# Patient Record
Sex: MALE | Race: WHITE | Employment: FULL TIME | URBAN - METROPOLITAN AREA
[De-identification: names, ages, dates, MRNs, and addresses within clinical notes are randomized per-mention and may not be internally consistent; named-entity substitution may affect disease eponyms.]

---

## 2017-03-07 ENCOUNTER — OFFICE VISIT (OUTPATIENT)
Dept: FAMILY MEDICINE CLINIC | Age: 62
End: 2017-03-07

## 2017-03-07 ENCOUNTER — TELEPHONE (OUTPATIENT)
Dept: FAMILY MEDICINE CLINIC | Age: 62
End: 2017-03-07

## 2017-03-07 VITALS
RESPIRATION RATE: 16 BRPM | HEART RATE: 76 BPM | SYSTOLIC BLOOD PRESSURE: 136 MMHG | WEIGHT: 208 LBS | DIASTOLIC BLOOD PRESSURE: 70 MMHG | BODY MASS INDEX: 28.21 KG/M2 | TEMPERATURE: 98.4 F

## 2017-03-07 DIAGNOSIS — R60.0 PERIORBITAL EDEMA: ICD-10-CM

## 2017-03-07 DIAGNOSIS — E11.9 TYPE 2 DIABETES, HBA1C GOAL < 8% (HCC): ICD-10-CM

## 2017-03-07 DIAGNOSIS — Z86.73 HISTORY OF CVA (CEREBROVASCULAR ACCIDENT): ICD-10-CM

## 2017-03-07 DIAGNOSIS — H10.13 ALLERGIC CONJUNCTIVITIS, BILATERAL: Primary | ICD-10-CM

## 2017-03-07 PROCEDURE — 99213 OFFICE O/P EST LOW 20 MIN: CPT | Performed by: NURSE PRACTITIONER

## 2017-03-07 PROCEDURE — 96372 THER/PROPH/DIAG INJ SC/IM: CPT | Performed by: NURSE PRACTITIONER

## 2017-03-07 RX ORDER — METHYLPREDNISOLONE ACETATE 80 MG/ML
120 INJECTION, SUSPENSION INTRA-ARTICULAR; INTRALESIONAL; INTRAMUSCULAR; SOFT TISSUE ONCE
Status: COMPLETED | OUTPATIENT
Start: 2017-03-07 | End: 2017-03-07

## 2017-03-07 RX ORDER — OLOPATADINE HYDROCHLORIDE 2 MG/ML
1 SOLUTION/ DROPS OPHTHALMIC DAILY
Qty: 1 BOTTLE | Refills: 0 | Status: SHIPPED | OUTPATIENT
Start: 2017-03-07 | End: 2017-04-07 | Stop reason: ALTCHOICE

## 2017-03-07 RX ORDER — PREDNISONE 10 MG/1
TABLET ORAL
Qty: 30 TABLET | Refills: 0 | Status: SHIPPED | OUTPATIENT
Start: 2017-03-07 | End: 2017-03-17

## 2017-03-07 RX ORDER — ERYTHROMYCIN 5 MG/G
1 OINTMENT OPHTHALMIC 3 TIMES DAILY
Qty: 1 TUBE | Refills: 0 | Status: SHIPPED | OUTPATIENT
Start: 2017-03-07 | End: 2017-03-17

## 2017-03-07 RX ORDER — AZITHROMYCIN 250 MG/1
TABLET, FILM COATED ORAL
Qty: 6 TABLET | Refills: 0 | Status: SHIPPED | OUTPATIENT
Start: 2017-03-07 | End: 2017-03-17

## 2017-03-07 RX ADMIN — METHYLPREDNISOLONE ACETATE 120 MG: 80 INJECTION, SUSPENSION INTRA-ARTICULAR; INTRALESIONAL; INTRAMUSCULAR; SOFT TISSUE at 09:10

## 2017-03-20 ASSESSMENT — ENCOUNTER SYMPTOMS
RESPIRATORY NEGATIVE: 1
EYE REDNESS: 1
EYE DISCHARGE: 1
GASTROINTESTINAL NEGATIVE: 1
PHOTOPHOBIA: 1

## 2017-04-05 RX ORDER — AMLODIPINE BESYLATE AND BENAZEPRIL HYDROCHLORIDE 10; 40 MG/1; MG/1
1 CAPSULE ORAL DAILY
Qty: 10 CAPSULE | Refills: 0 | Status: SHIPPED | OUTPATIENT
Start: 2017-04-05 | End: 2017-04-07 | Stop reason: SDUPTHER

## 2017-04-07 ENCOUNTER — OFFICE VISIT (OUTPATIENT)
Dept: FAMILY MEDICINE CLINIC | Age: 62
End: 2017-04-07

## 2017-04-07 VITALS
BODY MASS INDEX: 28.04 KG/M2 | TEMPERATURE: 98.2 F | HEIGHT: 72 IN | RESPIRATION RATE: 12 BRPM | WEIGHT: 207 LBS | HEART RATE: 64 BPM | DIASTOLIC BLOOD PRESSURE: 64 MMHG | SYSTOLIC BLOOD PRESSURE: 116 MMHG

## 2017-04-07 DIAGNOSIS — H53.40 VISUAL FIELD CUT: ICD-10-CM

## 2017-04-07 DIAGNOSIS — E11.9 DM TYPE 2, GOAL HBA1C < 7% (HCC): ICD-10-CM

## 2017-04-07 DIAGNOSIS — E78.2 MIXED HYPERLIPIDEMIA: ICD-10-CM

## 2017-04-07 DIAGNOSIS — Z13.9 SCREENING: ICD-10-CM

## 2017-04-07 DIAGNOSIS — Z00.00 WELLNESS EXAMINATION: Primary | ICD-10-CM

## 2017-04-07 DIAGNOSIS — I63.50 CEREBRAL ARTERY OCCLUSION WITH CEREBRAL INFARCTION (HCC): ICD-10-CM

## 2017-04-07 DIAGNOSIS — M15.9 PRIMARY OSTEOARTHRITIS INVOLVING MULTIPLE JOINTS: Chronic | ICD-10-CM

## 2017-04-07 DIAGNOSIS — N40.0 BENIGN NON-NODULAR PROSTATIC HYPERPLASIA WITHOUT LOWER URINARY TRACT SYMPTOMS: Chronic | ICD-10-CM

## 2017-04-07 DIAGNOSIS — I10 ESSENTIAL HYPERTENSION: Chronic | ICD-10-CM

## 2017-04-07 DIAGNOSIS — R06.3 CENTRAL SLEEP APNEA DUE TO CHEYNE-STOKES RESPIRATION: ICD-10-CM

## 2017-04-07 PROCEDURE — 99396 PREV VISIT EST AGE 40-64: CPT | Performed by: NURSE PRACTITIONER

## 2017-04-07 RX ORDER — ATENOLOL 25 MG/1
25 TABLET ORAL DAILY
Qty: 90 TABLET | Refills: 3 | Status: SHIPPED | OUTPATIENT
Start: 2017-04-07 | End: 2017-10-13 | Stop reason: RX

## 2017-04-07 RX ORDER — GLIMEPIRIDE 2 MG/1
2 TABLET ORAL
Qty: 90 TABLET | Refills: 3 | Status: SHIPPED | OUTPATIENT
Start: 2017-04-07 | End: 2018-06-10 | Stop reason: SDUPTHER

## 2017-04-07 RX ORDER — IBUPROFEN 200 MG
TABLET ORAL
Refills: 0 | COMMUNITY
Start: 2017-03-22 | End: 2017-04-07 | Stop reason: SDUPTHER

## 2017-04-07 RX ORDER — ATORVASTATIN CALCIUM 40 MG/1
40 TABLET, FILM COATED ORAL DAILY
Qty: 90 TABLET | Refills: 0 | Status: SHIPPED | OUTPATIENT
Start: 2017-04-07 | End: 2017-04-07

## 2017-04-07 RX ORDER — AMLODIPINE BESYLATE AND BENAZEPRIL HYDROCHLORIDE 10; 40 MG/1; MG/1
1 CAPSULE ORAL DAILY
Qty: 90 CAPSULE | Refills: 3 | Status: SHIPPED | OUTPATIENT
Start: 2017-04-07 | End: 2018-05-25 | Stop reason: SDUPTHER

## 2017-04-07 RX ORDER — TOBRAMYCIN AND DEXAMETHASONE 3; 1 MG/ML; MG/ML
SUSPENSION/ DROPS OPHTHALMIC
Refills: 0 | COMMUNITY
Start: 2017-03-28 | End: 2017-09-26

## 2017-04-18 ASSESSMENT — ENCOUNTER SYMPTOMS
GASTROINTESTINAL NEGATIVE: 1
EYES NEGATIVE: 1
BACK PAIN: 1
RESPIRATORY NEGATIVE: 1

## 2017-05-13 LAB
ALBUMIN SERPL-MCNC: 4.5 G/DL (ref 3.2–5.3)
ALK PHOSPHATASE: 70 IU/L (ref 35–121)
ALT SERPL-CCNC: 55 IU/L (ref 5–59)
ANION GAP SERPL CALCULATED.3IONS-SCNC: 12 MMOL/L
AST SERPL-CCNC: 29 IU/L (ref 10–42)
AVERAGE GLUCOSE: 143 MG/DL (ref 66–114)
BILIRUB SERPL-MCNC: 1 MG/DL (ref 0.2–1.3)
BUN BLDV-MCNC: 17 MG/DL (ref 10–20)
CALCIUM SERPL-MCNC: 9.6 MG/DL (ref 8.7–10.8)
CHLORIDE BLD-SCNC: 106 MMOL/L (ref 95–111)
CHOLESTEROL/HDL RATIO: 3.4
CHOLESTEROL: 171 MG/DL
CO2: 26 MMOL/L (ref 21–32)
CREAT SERPL-MCNC: 0.8 MG/DL (ref 0.5–1.3)
EGFR AFRICAN AMERICAN: 120
EGFR IF NONAFRICAN AMERICAN: 99
GLUCOSE: 133 MG/DL (ref 70–100)
HBA1C MFR BLD: 6.6 % (ref 4.2–5.8)
HDLC SERPL-MCNC: 50 MG/DL (ref 40–60)
LDL CHOLESTEROL CALCULATED: 112 MG/DL
LDL/HDL RATIO: 2.2
POTASSIUM SERPL-SCNC: 4.4 MMOL/L (ref 3.5–5.4)
PSA, ULTRASENSITIVE: 2.21 NG/ML
SODIUM BLD-SCNC: 140 MMOL/L (ref 134–147)
TOTAL PROTEIN: 6.8 G/DL (ref 5.8–8)
TRIGL SERPL-MCNC: 46 MG/DL
VLDLC SERPL CALC-MCNC: 9 MG/DL

## 2017-05-30 RX ORDER — IBUPROFEN 200 MG
TABLET ORAL
Qty: 90 TABLET | Refills: 2 | Status: SHIPPED | OUTPATIENT
Start: 2017-05-30 | End: 2017-09-26 | Stop reason: SDUPTHER

## 2017-06-01 ENCOUNTER — TELEPHONE (OUTPATIENT)
Dept: CARDIOLOGY | Age: 62
End: 2017-06-01

## 2017-09-26 ENCOUNTER — OFFICE VISIT (OUTPATIENT)
Dept: FAMILY MEDICINE CLINIC | Age: 62
End: 2017-09-26
Payer: COMMERCIAL

## 2017-09-26 VITALS
TEMPERATURE: 98.1 F | DIASTOLIC BLOOD PRESSURE: 76 MMHG | RESPIRATION RATE: 12 BRPM | SYSTOLIC BLOOD PRESSURE: 138 MMHG | HEIGHT: 73 IN | HEART RATE: 64 BPM | WEIGHT: 206 LBS | BODY MASS INDEX: 27.3 KG/M2

## 2017-09-26 DIAGNOSIS — E11.9 DM TYPE 2, GOAL HBA1C < 7% (HCC): ICD-10-CM

## 2017-09-26 DIAGNOSIS — Z11.59 NEED FOR HEPATITIS C SCREENING TEST: ICD-10-CM

## 2017-09-26 DIAGNOSIS — H65.21 RIGHT CHRONIC SEROUS OTITIS MEDIA: Primary | ICD-10-CM

## 2017-09-26 PROCEDURE — 99213 OFFICE O/P EST LOW 20 MIN: CPT | Performed by: NURSE PRACTITIONER

## 2017-09-26 PROCEDURE — 96372 THER/PROPH/DIAG INJ SC/IM: CPT | Performed by: NURSE PRACTITIONER

## 2017-09-26 RX ORDER — PREDNISONE 1 MG/1
5 TABLET ORAL 2 TIMES DAILY
Qty: 14 TABLET | Refills: 0 | Status: SHIPPED | OUTPATIENT
Start: 2017-09-26 | End: 2017-10-03

## 2017-09-26 RX ORDER — AZITHROMYCIN 250 MG/1
TABLET, FILM COATED ORAL
Qty: 6 TABLET | Refills: 0 | Status: SHIPPED | OUTPATIENT
Start: 2017-09-26 | End: 2017-10-06

## 2017-09-26 RX ORDER — METHYLPREDNISOLONE ACETATE 80 MG/ML
80 INJECTION, SUSPENSION INTRA-ARTICULAR; INTRALESIONAL; INTRAMUSCULAR; SOFT TISSUE ONCE
Status: COMPLETED | OUTPATIENT
Start: 2017-09-26 | End: 2017-09-26

## 2017-09-26 RX ADMIN — METHYLPREDNISOLONE ACETATE 80 MG: 80 INJECTION, SUSPENSION INTRA-ARTICULAR; INTRALESIONAL; INTRAMUSCULAR; SOFT TISSUE at 08:32

## 2017-09-26 ASSESSMENT — PATIENT HEALTH QUESTIONNAIRE - PHQ9
2. FEELING DOWN, DEPRESSED OR HOPELESS: 0
SUM OF ALL RESPONSES TO PHQ QUESTIONS 1-9: 0
SUM OF ALL RESPONSES TO PHQ9 QUESTIONS 1 & 2: 0
1. LITTLE INTEREST OR PLEASURE IN DOING THINGS: 0

## 2017-09-26 NOTE — PATIENT INSTRUCTIONS
of: July 29, 2016  Content Version: 11.3  © 6020-4258 [employer]. Care instructions adapted under license by [facility] ([facility]). If you have questions about a medical condition or this instruction, always ask your healthcare professional. [address] any warranty or liability for your use of this information.

## 2017-09-26 NOTE — PROGRESS NOTES
After obtaining consent, and per orders of Durene Flavors, injection of Depo 80 mg was given in left glut IM by Oralia Mejias. Patient instructed to report any adverse reaction to me immediately.

## 2017-09-26 NOTE — MR AVS SNAPSHOT
treatment. You may need more tests if the fluid does not clear up after 3 months. · If your doctor prescribed antibiotics, take them as directed. Do not stop taking them just because you feel better. You need to take the full course of antibiotics. When should you call for help? Call your doctor now or seek immediate medical care if:  · You have symptoms of infection, such as:  ¨ Increased pain, swelling, warmth, or redness. ¨ Pus draining from the area. ¨ A fever. Watch closely for changes in your health, and be sure to contact your doctor if:  · You notice changes in hearing. · You do not get better as expected. Where can you learn more? Go to [email or web address].[email or web address] org and sign in to your SpeechTrans account. Enter P353 in the [employer] box to learn more about \"Middle Ear Fluid: Care Instructions. \"     If you do not have an account, please click on the \"Sign Up Now\" link. Current as of: July 29, 2016  Content Version: 11.3  © 4287-4266 [email or web address]. Care instructions adapted under license by [facility] ([facility]). If you have questions about a medical condition or this instruction, always ask your healthcare professional. [place] [address] any warranty or liability for your use of this information. Today's Medication Changes          These changes are accurate as of: 9/26/17  8:32 AM.  If you have any questions, ask your nurse or doctor. START taking these medications           azithromycin 250 MG tablet   Commonly known as:  ZITHROMAX Z-RAMIRO   Instructions:  2 pills orally for 1 day, then 1 pill orally for 4 days   Quantity:  6 tablet   Refills:  0   Started by:  Domingo Mello NP       predniSONE 5 MG tablet   Commonly known as:  DELTASONE   Instructions:   Take 1 tablet by mouth 2 times daily for 7 days   Quantity:  14 tablet   Refills:  0   Started by:  Domingo Mello NP         STOP taking these medications

## 2017-10-05 ENCOUNTER — TELEPHONE (OUTPATIENT)
Dept: FAMILY MEDICINE CLINIC | Age: 62
End: 2017-10-05

## 2017-10-05 RX ORDER — CIPROFLOXACIN 500 MG/1
500 TABLET, FILM COATED ORAL 2 TIMES DAILY
Qty: 20 TABLET | Refills: 0 | Status: SHIPPED | OUTPATIENT
Start: 2017-10-05 | End: 2017-10-15

## 2017-10-05 ASSESSMENT — ENCOUNTER SYMPTOMS
GASTROINTESTINAL NEGATIVE: 1
ALLERGIC/IMMUNOLOGIC NEGATIVE: 1
WHEEZING: 1
EYES NEGATIVE: 1

## 2017-10-05 NOTE — TELEPHONE ENCOUNTER
Patient said that his ear is about 60% better but he is still having problems. Do you want to prescribe more medication?   Pharmacy [address] [facility] Please advise [phone]

## 2017-10-05 NOTE — PROGRESS NOTES
History   Substance Use Topics    Smoking status: Former Smoker     Packs/day: 0.50     Years: 40.00     Types: Cigarettes     Quit date: 8/8/2013    Smokeless tobacco: Never Used      Comment: pt has Wellbutrin and started using it in December 2012    Alcohol use 0.0 oz/week      Comment: social      Current Outpatient Prescriptions   Medication Sig Dispense Refill    azithromycin (ZITHROMAX Z-RAMIRO) 250 MG tablet 2 pills orally for 1 day, then 1 pill orally for 4 days 6 tablet 0    Esomeprazole Magnesium (NEXIUM 24HR PO) Take by mouth      OLIVE LEAF PO Take by mouth      amLODIPine-benazepril (LOTREL) 10-40 MG per capsule Take 1 capsule by mouth daily 90 capsule 3    metFORMIN (GLUCOPHAGE) 500 MG tablet TAKE 2 TABLETS BY MOUTH TWICE DAILY 360 tablet 3    glimepiride (AMARYL) 2 MG tablet Take 1 tablet by mouth every morning (before breakfast) 90 tablet 3    atenolol (TENORMIN) 25 MG tablet Take 1 tablet by mouth daily 90 tablet 3    aspirin 325 MG tablet Take 325 mg by mouth daily      SITagliptin (JANUVIA) 100 MG tablet Take 1 tablet by mouth daily 90 tablet 3    glucose blood VI test strips (ACCU-CHEK SMARTVIEW) strip Test daily E11.9 50 strip 11    NONFORMULARY daily PROSTATE SUPPORT      multivitamin (THERAGRAN) per tablet Take 1 tablet by mouth daily.  Lancets MISC Use qd  Dx:  250.02 50 each 11    cetirizine (ZYRTEC) 10 MG tablet Take 10 mg by mouth daily. No current facility-administered medications for this visit.       No Known Allergies  Health Maintenance   Topic Date Due    Hepatitis C screen  1955    HIV screen  05/21/1972    DTaP/Tdap/Td vaccine (1 - Tdap) 05/21/1976    Pneumococcal med risk (1 of 1 - PPSV23) 05/21/1976    Diabetic retinal exam  07/16/2016    Zostavax vaccine  05/21/2017    Flu vaccine (1) 09/01/2017    Diabetic microalbuminuria test  09/02/2017    Diabetic foot exam  03/20/2018    Diabetic hemoglobin A1C test  05/12/2018    Lipid screen 2018    Colon cancer screen colonoscopy  2022         Objective:     Physical Exam   Constitutional: He is oriented to person, place, and time. He appears well-developed and well-nourished. HENT:   Head: Normocephalic and atraumatic. Right Ear: External ear normal. A middle ear effusion is present. Left Ear: Tympanic membrane is erythematous. A middle ear effusion is present. Nose: Nose normal.   Mouth/Throat: Oropharynx is clear and moist.   Eyes: Conjunctivae are normal.   Neck: Normal range of motion. Neck supple. Cardiovascular: Normal rate, regular rhythm and normal heart sounds. Pulmonary/Chest: Effort normal and breath sounds normal.   Abdominal: Soft. Bowel sounds are normal.   Musculoskeletal: Normal range of motion. Neurological: He is oriented to person, place, and time. Skin: Skin is dry. Psychiatric: He has a normal mood and affect. Nursing note and vitals reviewed. neg pedal exam, cap refill wnl, neg monofilament   /76  Pulse 64  Temp 98.1 °F (36.7 °C) (Oral)   Resp 12  Ht 6' 1\" (1.854 m)  Wt 206 lb (93.4 kg)  BMI 27.18 kg/m2      Impression/Plan:  1. Right chronic serous otitis media    2. DM type 2, goal HbA1c < 7% (HCC)    3.  Need for hepatitis C screening test      Requested Prescriptions     Signed Prescriptions Disp Refills    azithromycin (ZITHROMAX Z-RAMIRO) 250 MG tablet 6 tablet 0     Si pills orally for 1 day, then 1 pill orally for 4 days    predniSONE (DELTASONE) 5 MG tablet 14 tablet 0     Sig: Take 1 tablet by mouth 2 times daily for 7 days     Orders Placed This Encounter   Procedures    Hepatitis C Antibody     Standing Status:   Future     Standing Expiration Date:   2018    Comprehensive Metabolic Panel     Standing Status:   Future     Standing Expiration Date:   2018    Hemoglobin A1C     Standing Status:   Future     Standing Expiration Date:   2018    Albumin, Random Urine     Standing Status:   Future Standing Expiration Date:   9/26/2018       Patient given educational materials - see patient instructions. Discussed use, benefit, and side effects of prescribed medications. All patient questions answered. Pt voiced understanding. Reviewed health maintenance. Patient agreed with treatment plan. Follow up as directed.           Electronically signed by Gabe Hardy NP on 10/5/2017 at 1:08 PM

## 2017-10-12 NOTE — TELEPHONE ENCOUNTER
Received a fax from [facility] stating that they are not able to get the Atenolol 25mg and they are requesting a replacement prescription be sent to them. Please advise.     Last office visit 9/26/17

## 2017-10-13 RX ORDER — NEBIVOLOL 5 MG/1
5 TABLET ORAL DAILY
Qty: 90 TABLET | Refills: 3 | Status: SHIPPED | OUTPATIENT
Start: 2017-10-13 | End: 2018-12-12 | Stop reason: SDUPTHER

## 2017-10-20 ENCOUNTER — OFFICE VISIT (OUTPATIENT)
Dept: FAMILY MEDICINE CLINIC | Age: 62
End: 2017-10-20
Payer: COMMERCIAL

## 2017-10-20 ENCOUNTER — OFFICE VISIT (OUTPATIENT)
Dept: CARDIOLOGY CLINIC | Age: 62
End: 2017-10-20
Payer: COMMERCIAL

## 2017-10-20 VITALS
DIASTOLIC BLOOD PRESSURE: 76 MMHG | BODY MASS INDEX: 27.5 KG/M2 | HEIGHT: 72 IN | SYSTOLIC BLOOD PRESSURE: 142 MMHG | WEIGHT: 203 LBS | HEART RATE: 75 BPM

## 2017-10-20 VITALS
WEIGHT: 209.9 LBS | DIASTOLIC BLOOD PRESSURE: 66 MMHG | TEMPERATURE: 98.7 F | BODY MASS INDEX: 27.82 KG/M2 | HEART RATE: 84 BPM | RESPIRATION RATE: 16 BRPM | SYSTOLIC BLOOD PRESSURE: 138 MMHG | HEIGHT: 73 IN

## 2017-10-20 DIAGNOSIS — H91.91 DECREASED HEARING, RIGHT: Primary | ICD-10-CM

## 2017-10-20 DIAGNOSIS — F32.A MILD EPISODE OF DEPRESSION: ICD-10-CM

## 2017-10-20 DIAGNOSIS — H65.21 RIGHT CHRONIC SEROUS OTITIS MEDIA: ICD-10-CM

## 2017-10-20 DIAGNOSIS — I10 ESSENTIAL HYPERTENSION: Primary | Chronic | ICD-10-CM

## 2017-10-20 PROCEDURE — 99213 OFFICE O/P EST LOW 20 MIN: CPT | Performed by: NUCLEAR MEDICINE

## 2017-10-20 PROCEDURE — 99213 OFFICE O/P EST LOW 20 MIN: CPT | Performed by: NURSE PRACTITIONER

## 2017-10-20 PROCEDURE — 93000 ELECTROCARDIOGRAM COMPLETE: CPT | Performed by: NUCLEAR MEDICINE

## 2017-10-20 RX ORDER — CITALOPRAM 20 MG/1
20 TABLET ORAL DAILY
Qty: 30 TABLET | Refills: 3 | Status: SHIPPED | OUTPATIENT
Start: 2017-10-20 | End: 2018-07-25

## 2017-10-20 RX ORDER — CITALOPRAM 10 MG/1
10 TABLET ORAL DAILY
Qty: 30 TABLET | Refills: 0 | Status: SHIPPED | OUTPATIENT
Start: 2017-10-20 | End: 2018-02-09 | Stop reason: ALTCHOICE

## 2017-10-20 NOTE — PROGRESS NOTES
LEAF PO Take by mouth      amLODIPine-benazepril (LOTREL) 10-40 MG per capsule Take 1 capsule by mouth daily 90 capsule 3    metFORMIN (GLUCOPHAGE) 500 MG tablet TAKE 2 TABLETS BY MOUTH TWICE DAILY 360 tablet 3    glimepiride (AMARYL) 2 MG tablet Take 1 tablet by mouth every morning (before breakfast) 90 tablet 3    aspirin 325 MG tablet Take 325 mg by mouth daily      SITagliptin (JANUVIA) 100 MG tablet Take 1 tablet by mouth daily 90 tablet 3    glucose blood VI test strips (ACCU-CHEK SMARTVIEW) strip Test daily E11.9 50 strip 11    NONFORMULARY daily PROSTATE SUPPORT      multivitamin (THERAGRAN) per tablet Take 1 tablet by mouth daily.  Lancets MISC Use qd  Dx:  250.02 50 each 11    cetirizine (ZYRTEC) 10 MG tablet Take 10 mg by mouth daily. No current facility-administered medications for this visit.       No Known Allergies  Health Maintenance   Topic Date Due    Hepatitis C screen  1955    HIV screen  05/21/1972    DTaP/Tdap/Td vaccine (1 - Tdap) 05/21/1976    Pneumococcal med risk (1 of 1 - PPSV23) 05/21/1976    Diabetic retinal exam  07/16/2016    Zostavax vaccine  05/21/2017    Flu vaccine (1) 09/01/2017    Diabetic microalbuminuria test  09/02/2017    Diabetic foot exam  03/20/2018    Diabetic hemoglobin A1C test  05/12/2018    Lipid screen  05/12/2018    Colon cancer screen colonoscopy  09/08/2027       Subjective:  Review of Systems  General:   No fever, no chills, No fatigue or weight loss  Pulmonary:    some dyspnea, no wheezing  Cardiac:    Denies recent chest pain,   GI:     No nausea or vomiting, no abdominal pain  Neuro:     No dizziness or light headedness,   Musculoskeletal:  No recent active issues  Extremities:   No edema, good peripheral pulses      Objective:  Physical Exam  BP (!) 142/70   Pulse 75   Ht 6' (1.829 m)   Wt 203 lb (92.1 kg)   BMI 27.53 kg/m²   General:   Well developed, well nourished  Lungs:    Clear to auscultation  Heart: Normal S1 S2, Slight murmur. no rubs, no gallops  Abdomen:   Soft, non tender, no organomegalies, positive bowel sounds  Extremities:   No edema, no cyanosis, good peripheral pulses  Neurological:   Awake, alert, oriented. No obvious focal deficits  Musculoskelatal:  No obvious deformities    Assessment:     1. Essential hypertension  EKG 12 Lead   risk for CAD  No new symptoms  Diabetes mellitus: followed by family physician, seems stable. Patient needs very tight control to minimize cardiac risk    ECG in office was done today. I reviewed the ECG. No acute findings      Plan:  No Follow-up on file. As above  Continue risk factor modification and medical management  Thank you for allowing me to participate in the care of your patient. Please don't hesitate to contact me regarding any further issues related to the patient care    Orders Placed:  Orders Placed This Encounter   Procedures    EKG 12 Lead     Order Specific Question:   Reason for Exam?     Answer: Other       Medications Prescribed:  No orders of the defined types were placed in this encounter. Discussed use, benefit, and side effects of prescribed medications. All patient questions answered. Pt voiced understanding. Instructed to continue current medications, diet and exercise. Continue risk factor modification and medical management. Patient agreed with treatment plan. Follow up as directed.     Electronically signed by Rosetta Moon MD on 10/20/2017 at 11:36 AM

## 2017-10-20 NOTE — PATIENT INSTRUCTIONS
You may receive a survey about your visit with us today. The feedback from our patients helps us identify what is working well and where the service to all patients can be enhanced. Thank you! Patient Education        Recovering From Depression: Care Instructions  Your Care Instructions  Taking good care of yourself is important as you recover from depression. In time, your symptoms will fade as your treatment takes hold. Do not give up. Instead, focus your energy on getting better. Your mood will improve. It just takes some time. Focus on things that can help you feel better, such as being with friends and family, eating well, and getting enough rest. But take things slowly. Do not do too much too soon. You will begin to feel better gradually. Follow-up care is a key part of your treatment and safety. Be sure to make and go to all appointments, and call your doctor if you are having problems. It's also a good idea to know your test results and keep a list of the medicines you take. How can you care for yourself at home? Be realistic  · If you have a large task to do, break it up into smaller steps you can handle, and just do what you can. · You may want to put off important decisions until your depression has lifted. If you have plans that will have a major impact on your life, such as marriage, divorce, or a job change, try to wait a bit. Talk it over with friends and loved ones who can help you look at the overall picture first.  · Reaching out to people for help is important. Do not isolate yourself. Let your family and friends help you. Find someone you can trust and confide in, and talk to that person. · Be patient, and be kind to yourself. Remember that depression is not your fault and is not something you can overcome with willpower alone. Treatment is necessary for depression, just like for any other illness. Feeling better takes time, and your mood will improve little by little.   Stay active  · Stay busy and get outside. Take a walk, or try some other light exercise. · Talk with your doctor about an exercise program. Exercise can help with mild depression. · Go to a movie or concert. Take part in a Adventism activity or other social gathering. Go to a ball game. · Ask a friend to have dinner with you. Take care of yourself  · Eat a balanced diet with plenty of fresh fruits and vegetables, whole grains, and lean protein. If you have lost your appetite, eat small snacks rather than large meals. · Avoid drinking alcohol or using illegal drugs. Do not take medicines that have not been prescribed for you. They may interfere with medicines you may be taking for depression, or they may make your depression worse. · Take your medicines exactly as they are prescribed. You may start to feel better within 1 to 3 weeks of taking antidepressant medicine. But it can take as many as 6 to 8 weeks to see more improvement. If you have questions or concerns about your medicines, or if you do not notice any improvement by 3 weeks, talk to your doctor. · If you have any side effects from your medicine, tell your doctor. Antidepressants can make you feel tired, dizzy, or nervous. Some people have dry mouth, constipation, headaches, sexual problems, or diarrhea. Many of these side effects are mild and will go away on their own after you have been taking the medicine for a few weeks. Some may last longer. Talk to your doctor if side effects are bothering you too much. You might be able to try a different medicine. · Get enough sleep. If you have problems sleeping:  ¨ Go to bed at the same time every night, and get up at the same time every morning. ¨ Keep your bedroom dark and quiet. ¨ Do not exercise after 5:00 p.m. ¨ Avoid drinks with caffeine after 5:00 p.m. · Avoid sleeping pills unless they are prescribed by the doctor treating your depression.  Sleeping pills may make you groggy during the day, and they may interact with other medicine you are taking. · If you have any other illnesses, such as diabetes, heart disease, or high blood pressure, make sure to continue with your treatment. Tell your doctor about all of the medicines you take, including those with or without a prescription. · Keep the numbers for these national suicide hotlines: 0-270-114-TALK (7-695.557.1968) and 4-171-FDMYTVJ (0-363.766.8886). If you or someone you know talks about suicide or feeling hopeless, get help right away. When should you call for help? Call 911 anytime you think you may need emergency care. For example, call if:  · You feel like hurting yourself or someone else. · Someone you know has depression and is about to attempt or is attempting suicide. Call your doctor now or seek immediate medical care if:  · You hear voices. · Someone you know has depression and:  ¨ Starts to give away his or her possessions. ¨ Uses illegal drugs or drinks alcohol heavily. ¨ Talks or writes about death, including writing suicide notes or talking about guns, knives, or pills. ¨ Starts to spend a lot of time alone. ¨ Acts very aggressively or suddenly appears calm. Watch closely for changes in your health, and be sure to contact your doctor if:  · You do not get better as expected. Where can you learn more? Go to https://MemorandompeApplyMap.Deitek Systems. org and sign in to your [employer] account. Enter H662 in the [facility] box to learn more about \"Recovering From Depression: Care Instructions. \"     If you do not have an account, please click on the \"Sign Up Now\" link. Current as of: July 26, 2016  Content Version: 11.3  © 3132-1551 [employer]. Care instructions adapted under license by [facility] ([facility]). If you have questions about a medical condition or this instruction, always ask your healthcare professional. [address] any warranty or liability for your use of this information.        Patient the medicine for a few weeks. Some may last longer. Talk to your doctor if side effects bother you too much. You might be able to try a different medicine. If you are pregnant or breastfeeding, talk to your doctor about what medicines you can take. Learn about counseling  In many cases, counseling can work as well as medicines to treat mild to moderate depression. Counseling is done by licensed mental health providers, such as psychologists, social workers, and some types of nurses. It can be done in one-on-one sessions or in a group setting. Many people find group sessions helpful. Cognitive-behavioral therapy is a type of counseling. In this treatment therapy, you learn how to see and change unhelpful thinking styles that may be adding to your depression. Counseling and medicines often work well when used together. To manage depression  · Be physically active. Getting 30 minutes of exercise each day is good for your body and your mind. Begin slowly if it is hard for you to get started. If you already exercise, keep it up. · Plan something pleasant for yourself every day. Include activities that you have enjoyed in the past.  · Get enough sleep. Talk to your doctor if you have problems sleeping. · Eat a balanced diet. If you do not feel hungry, eat small snacks rather than large meals. · Do not drink alcohol, use illegal drugs, or take medicines that your doctor has not prescribed for you. They may interfere with your treatment. · Spend time with family and friends. It may help to speak openly about your depression with people you trust.  · Take your medicines exactly as prescribed. Call your doctor if you think you are having a problem with your medicine. · Do not make major life decisions while you are depressed. Depression may change the way you think. You will be able to make better decisions after you feel better. · Think positively.  Challenge negative thoughts with statements such as \"I am hopeful\"; in how well you hear. It can range from mild to severe. Permanent hearing loss can occur with aging. It also can happen when you are exposed long-term to loud noise. Examples include listening to loud music, riding motorcycles, or being around other loud machines. Hearing loss can affect your work and home life. It can make you feel lonely or depressed. You may feel that you have lost your independence. But hearing aids and other devices can help you hear better and feel connected to others. Follow-up care is a key part of your treatment and safety. Be sure to make and go to all appointments, and call your doctor if you are having problems. It's also a good idea to know your test results and keep a list of the medicines you take. How can you care for yourself at home? · Avoid loud noises whenever possible. This helps keep your hearing from getting worse. · Always wear hearing protection around loud noises. · Wear a hearing aid as directed. See a person who can help you pick a hearing aid that fits you. · Have hearing tests as your doctor suggests. They can show whether your hearing has changed. Your hearing aid may need to be adjusted. · Use other devices as needed. These may include:  ¨ Telephone amplifiers and hearing aids that can connect to a television, stereo, radio, or microphone. ¨ Devices that use lights or vibrations. These alert you to the doorbell, a ringing telephone, or a baby monitor. ¨ Television closed-captioning. This shows the words at the bottom of the screen. Most new TVs can do this. Jessica Saleh (text telephone). This lets you type messages back and forth on the telephone instead of talking or listening. These devices are also called TDD. When messages are typed on the keyboard, they are sent over the phone line to a receiving TTY. The message is shown on a monitor. · Use pagers, fax machines, and email if it is hard for you to communicate by telephone.   · Try to learn a listening

## 2017-10-27 NOTE — PROGRESS NOTES
[address]  [facility]  [facility]  Dept: [phone]  Dept Fax: [phone]: [phone]  PROGRESS NOTE      Visit Date: 10/27/2017    Shirley Jonas is a 63 y.o. male who presents today for:  Chief Complaint   Patient presents with    Ear Problem     here today for an ear problem-echoing- feels like something is in there--has went through 2 rounds of antibiotics    Other     would like a mood enhancer         Subjective:  C/o continued decreased hearing right ear, ear fullness, echoing. tx atb x2, steroids. No benefit. No drainage, dizziness, ha, cp, sob,, ha. C/o depressive mood. Lack of interest, daytime sleepiness, fatgiue. Review of Systems   HENT: Positive for ear pain and hearing loss. Negative for ear discharge. Psychiatric/Behavioral: Positive for decreased concentration and dysphoric mood. All other systems reviewed and are negative.     Past Medical History:   Diagnosis Date    BPH (benign prostatic hyperplasia)     COPD (chronic obstructive pulmonary disease) (HCC)     Depression     Diabetes mellitus ([address]     Factor 5 Leiden mutation, heterozygous ([address]     Heterozygous MTHFR mutation [id number] ([address]     Hyperglycemia     Hypertension     Hypertension     Osteoarthritis     Sleep apnea     Stroke ([address] 8/8/2013    Type II or unspecified type diabetes mellitus without mention of complication, not stated as uncontrolled 8/2012    Visual field cut 8/8/2013    Right visual field cut      Past Surgical History:   Procedure Laterality Date    ABDOMEN SURGERY      APPENDECTOMY      CERVICAL FUSION  08    COLONOSCOPY  6/8/12    [facility]    SHOULDER SURGERY      error    TONSILLECTOMY      UMBILICAL HERNIA REPAIR  09/17/2012  Dr Rose Cage     Family History   Problem Relation Age of Onset    Diabetes Father     Diabetes Brother     Stroke Brother      Social History   Substance Use Topics    Smoking status: Former Smoker     Packs/day: 0.50     Years: 40.00     Types: Cigarettes     Quit date: 8/8/2013    Smokeless tobacco: Never Used      Comment: pt has Wellbutrin and started using it in December 2012    Alcohol use 0.0 oz/week      Comment: social      Current Outpatient Prescriptions   Medication Sig Dispense Refill    citalopram (CELEXA) 10 MG tablet Take 1 tablet by mouth daily 30 tablet 0    citalopram (CELEXA) 20 MG tablet Take 1 tablet by mouth daily 30 tablet 3    nebivolol (BYSTOLIC) 5 MG tablet Take 1 tablet by mouth daily 90 tablet 3    Esomeprazole Magnesium (NEXIUM 24HR PO) Take by mouth      OLIVE LEAF PO Take by mouth      amLODIPine-benazepril (LOTREL) 10-40 MG per capsule Take 1 capsule by mouth daily 90 capsule 3    metFORMIN (GLUCOPHAGE) 500 MG tablet TAKE 2 TABLETS BY MOUTH TWICE DAILY 360 tablet 3    glimepiride (AMARYL) 2 MG tablet Take 1 tablet by mouth every morning (before breakfast) 90 tablet 3    aspirin 325 MG tablet Take 325 mg by mouth daily      SITagliptin (JANUVIA) 100 MG tablet Take 1 tablet by mouth daily 90 tablet 3    glucose blood VI test strips (ACCU-CHEK SMARTVIEW) strip Test daily E11.9 50 strip 11    NONFORMULARY daily PROSTATE SUPPORT      multivitamin (THERAGRAN) per tablet Take 1 tablet by mouth daily.  Lancets MISC Use qd  Dx:  250.02 50 each 11    cetirizine (ZYRTEC) 10 MG tablet Take 10 mg by mouth daily. No current facility-administered medications for this visit.       No Known Allergies  Health Maintenance   Topic Date Due    Hepatitis C screen  1955    HIV screen  05/21/1972    DTaP/Tdap/Td vaccine (1 - Tdap) 05/21/1976    Pneumococcal med risk (1 of 1 - PPSV23) 05/21/1976    Diabetic retinal exam  07/16/2016    Zostavax vaccine  05/21/2017    Flu vaccine (1) 09/01/2017    Diabetic microalbuminuria test  09/02/2017    Diabetic foot exam  03/20/2018    Diabetic hemoglobin A1C test  05/12/2018    Lipid screen 05/12/2018    Colon cancer screen colonoscopy  09/08/2027         Objective:     Physical Exam   Constitutional: He is oriented to person, place, and time. He appears well-developed and well-nourished. HENT:   Head: Normocephalic. Right Ear: External ear normal. A middle ear effusion is present. Left Ear: External ear normal.   Mouth/Throat: Oropharynx is clear and moist.   Eyes: Conjunctivae are normal.   Neck: Neck supple. Cardiovascular: Normal rate, regular rhythm, normal heart sounds and intact distal pulses. Pulmonary/Chest: Effort normal and breath sounds normal.   Abdominal: Soft. Musculoskeletal: Normal range of motion. Neurological: He is alert and oriented to person, place, and time. Skin: Skin is warm and dry. Psychiatric: He has a normal mood and affect. Nursing note and vitals reviewed. /66 (Site: Right Arm, Position: Sitting)   Pulse 84   Temp 98.7 °F (37.1 °C) (Oral)   Resp 16   Ht 6' 1\" (1.854 m)   Wt 209 lb 14.4 oz (95.2 kg)   BMI 27.69 kg/m²       Impression/Plan:  1. Decreased hearing, right    2. Mild episode of depression    3. Right chronic serous otitis media      Requested Prescriptions     Signed Prescriptions Disp Refills    citalopram (CELEXA) 10 MG tablet 30 tablet 0     Sig: Take 1 tablet by mouth daily    citalopram (CELEXA) 20 MG tablet 30 tablet 3     Sig: Take 1 tablet by mouth daily     Orders Placed This Encounter   Procedures   Óscar Fischer MD     Referral Priority:   Routine     Referral Type:   Consult for Advice and Opinion     Referral Reason:   Specialty Services Required     Referred to Provider:   Anthony Muse MD     Requested Specialty:   Otolaryngology     Number of Visits Requested:   1    Audiometry with tympanometry     Standing Status:   Future     Standing Expiration Date:   10/20/2018       Patient given educational materials - see patient instructions.   Discussed use, benefit, and side effects of prescribed medications. All patient questions answered. Pt voiced understanding. Reviewed health maintenance. Patient agreed with treatment plan. Follow up as directed.           Electronically signed by Nabil Ortega NP on 10/27/2017 at 11:47 AM

## 2017-11-03 ENCOUNTER — HOSPITAL ENCOUNTER (OUTPATIENT)
Dept: AUDIOLOGY | Age: 62
Discharge: HOME | End: 2017-11-03
Payer: COMMERCIAL

## 2017-11-03 PROCEDURE — 92567 TYMPANOMETRY: CPT | Performed by: AUDIOLOGIST

## 2017-11-03 PROCEDURE — 92557 COMPREHENSIVE HEARING TEST: CPT | Performed by: AUDIOLOGIST

## 2017-11-03 NOTE — LETTER
[address] Audiology  [facility]  [address]  [address] [place] [address] Road [facility]  Phone: [phone]    Zana Duty        November 3, 2017     Augustine Doherty [address] [facility] [address], [address]    Patient: Ciara Larry   MR Number: [id number]   YOB: 1955   Date of Visit: 11/3/2017       Dear Dr. Darwin Schwab: Thank you for referring Becca Alicea to me for evaluation. Below are the relevant portions of my assessment and plan of care. ACCOUNT #: [de-identified]    AUDIOLOGICAL EVALUATION      REASON FOR TESTING:  Patient reported aural fullness and decreased hearing in his right ear. He finished taking an antibiotic but is still experiencing symptoms. Patient has a history of occupational noise exposure (). OTOSCOPY: clear EAC's bilaterally. AUDIOGRAM        Reliability: good  Audiometer Used:  GSI-61    PURE TONES     RE    LE         WNL            Mild        Moderate           Mod-Severe       Severe        Profound    TYPE     RE    LE        SNHL         Conductive HL        Mixed HL      CONFIGURATION    RE    LE        Essentially Flat          Sloping      Steeply Sloping       Rising      Cookie Bite    SPEECH AUDIOMETRY   Right Left Sound Field Aided   PTA 20 8     SRT 20 10     SAT       MASKING       % WRS   QUIET 100 100      30 SL 30 SL     %WRS   NOISE              MCL       [facility]            Live Voice       Recorded       List        WORD RECOGNITION   RE    LE       Excellent         Good      Fair      Poor      Very Poor    TYMPANOGRAMS  RE    LE       WNL         WNL w/reduced mobility      WNL w/hyper mobility      Negative pressure      Flat w/normal ECV      Flat w/large ECV      Patent PE tube      Non-Patent PE tube      Could Not Test    COMMENTS: Audiometric results indicate a moderate mixed low frequency hearing loss rising to normal hearing sloping to a moderate mixed loss in the right ear.   Hearing is normal through 2000 Hz in the left ear sloping

## 2017-12-29 RX ORDER — IBUPROFEN 200 MG
TABLET ORAL
Qty: 90 TABLET | Refills: 2 | Status: SHIPPED | OUTPATIENT
Start: 2017-12-29 | End: 2018-02-09 | Stop reason: ALTCHOICE

## 2018-02-03 LAB
ALBUMIN SERPL-MCNC: 4.6 G/DL (ref 3.2–5.3)
ALK PHOSPHATASE: 66 IU/L (ref 35–121)
ALT SERPL-CCNC: 45 IU/L (ref 5–59)
ANION GAP SERPL CALCULATED.3IONS-SCNC: 12 MMOL/L
AST SERPL-CCNC: 25 IU/L (ref 10–42)
AVERAGE GLUCOSE: 126 MG/DL (ref 66–114)
BILIRUB SERPL-MCNC: 1 MG/DL (ref 0.2–1.3)
BUN BLDV-MCNC: 15 MG/DL (ref 10–20)
CALCIUM SERPL-MCNC: 9.6 MG/DL (ref 8.7–10.8)
CHLORIDE BLD-SCNC: 103 MMOL/L (ref 95–111)
CO2: 28 MMOL/L (ref 21–32)
CREAT SERPL-MCNC: 0.9 MG/DL (ref 0.5–1.3)
CREATINE, URINE: 187.3 MG/DL
EGFR AFRICAN AMERICAN: 104
EGFR IF NONAFRICAN AMERICAN: 86
GLUCOSE: 152 MG/DL (ref 70–100)
HBA1C MFR BLD: 6 % (ref 4.2–5.8)
HEPATITIS C ANTIBODY: NEGATIVE
MICROALBUMIN/CREAT 24H UR: 2.6 MG/DL
MICROALBUMIN/CREAT UR-RTO: 14 MCG/MG
POTASSIUM SERPL-SCNC: 4.2 MMOL/L (ref 3.5–5.4)
SODIUM BLD-SCNC: 139 MMOL/L (ref 134–147)
TOTAL PROTEIN: 6.7 G/DL (ref 5.8–8)

## 2018-02-09 ENCOUNTER — OFFICE VISIT (OUTPATIENT)
Dept: PULMONOLOGY | Age: 63
End: 2018-02-09
Payer: COMMERCIAL

## 2018-02-09 VITALS
HEART RATE: 64 BPM | HEIGHT: 73 IN | DIASTOLIC BLOOD PRESSURE: 66 MMHG | WEIGHT: 207.4 LBS | BODY MASS INDEX: 27.49 KG/M2 | SYSTOLIC BLOOD PRESSURE: 134 MMHG | OXYGEN SATURATION: 97 %

## 2018-02-09 DIAGNOSIS — R06.3 CENTRAL SLEEP APNEA DUE TO CHEYNE-STOKES RESPIRATION: Primary | ICD-10-CM

## 2018-02-09 PROCEDURE — 99213 OFFICE O/P EST LOW 20 MIN: CPT | Performed by: PHYSICIAN ASSISTANT

## 2018-02-09 ASSESSMENT — ENCOUNTER SYMPTOMS
COUGH: 0
SINUS PAIN: 0
WHEEZING: 0
HEARTBURN: 0
SHORTNESS OF BREATH: 0
RESPIRATORY NEGATIVE: 1
SPUTUM PRODUCTION: 0
ORTHOPNEA: 0
GASTROINTESTINAL NEGATIVE: 1
SORE THROAT: 0
NAUSEA: 0
EYES NEGATIVE: 1

## 2018-03-15 RX ORDER — SITAGLIPTIN 100 MG/1
100 TABLET, FILM COATED ORAL DAILY
Qty: 90 TABLET | Refills: 2 | Status: SHIPPED | OUTPATIENT
Start: 2018-03-15 | End: 2018-12-28 | Stop reason: SDUPTHER

## 2018-05-30 RX ORDER — AMLODIPINE BESYLATE AND BENAZEPRIL HYDROCHLORIDE 10; 40 MG/1; MG/1
1 CAPSULE ORAL DAILY
Qty: 90 CAPSULE | Refills: 2 | Status: SHIPPED | OUTPATIENT
Start: 2018-05-30 | End: 2019-02-25 | Stop reason: SDUPTHER

## 2018-06-11 RX ORDER — GLIMEPIRIDE 2 MG/1
TABLET ORAL
Qty: 90 TABLET | Refills: 0 | Status: SHIPPED | OUTPATIENT
Start: 2018-06-11 | End: 2018-10-12

## 2018-07-20 ENCOUNTER — NURSE TRIAGE (OUTPATIENT)
Dept: ADMINISTRATIVE | Age: 63
End: 2018-07-20

## 2018-07-20 NOTE — TELEPHONE ENCOUNTER
Wife Hoda Xie states that he told her yesterday, \"I just have no feelings about anything. I should have just stayed home\" Had seen FP before and was put on Citolapram- apparent real low dose. Seems to help a little to begin with. Has been sleeping a lot ,just sitting on the desk. Not sure if she should just wait to see the Dr on Wed. States that he did have an uncle commit suicide and she feels that she just got a lot of warning signs yesterday. Discussed that they are in [facility]. I advised that she go down to the  and inquire of local medical care that does have mental health screening, if they can help her. Like at [facility]. We have a team that would assess him and report to the D r if the person is a danger to themselves or what needs to happen. Then she can approach her  with the idea of being seen. Sounds like something that she will consider, Just do not want her feeling isolated there , till they leave to come home on Sunday.

## 2018-07-25 ENCOUNTER — OFFICE VISIT (OUTPATIENT)
Dept: FAMILY MEDICINE CLINIC | Age: 63
End: 2018-07-25
Payer: COMMERCIAL

## 2018-07-25 VITALS
BODY MASS INDEX: 27.7 KG/M2 | SYSTOLIC BLOOD PRESSURE: 130 MMHG | HEIGHT: 73 IN | RESPIRATION RATE: 16 BRPM | HEART RATE: 76 BPM | DIASTOLIC BLOOD PRESSURE: 74 MMHG | WEIGHT: 209 LBS

## 2018-07-25 DIAGNOSIS — R06.3 CENTRAL SLEEP APNEA DUE TO CHEYNE-STOKES RESPIRATION: ICD-10-CM

## 2018-07-25 DIAGNOSIS — Z86.73 HISTORY OF CVA IN ADULTHOOD: ICD-10-CM

## 2018-07-25 DIAGNOSIS — F34.1 DYSTHYMIA: Primary | ICD-10-CM

## 2018-07-25 PROCEDURE — 99213 OFFICE O/P EST LOW 20 MIN: CPT | Performed by: NURSE PRACTITIONER

## 2018-07-25 RX ORDER — LATANOPROST 50 UG/ML
1 SOLUTION/ DROPS OPHTHALMIC DAILY
COMMUNITY
Start: 2018-06-18

## 2018-07-29 ENCOUNTER — HOSPITAL ENCOUNTER (EMERGENCY)
Age: 63
Discharge: HOME | End: 2018-07-29
Payer: COMMERCIAL

## 2018-07-29 VITALS
OXYGEN SATURATION: 97 % | HEIGHT: 72 IN | BODY MASS INDEX: 27.77 KG/M2 | SYSTOLIC BLOOD PRESSURE: 119 MMHG | DIASTOLIC BLOOD PRESSURE: 61 MMHG | WEIGHT: 205 LBS | HEART RATE: 64 BPM | TEMPERATURE: 97.7 F | RESPIRATION RATE: 18 BRPM

## 2018-07-29 DIAGNOSIS — L23.7 POISON IVY DERMATITIS: Primary | ICD-10-CM

## 2018-07-29 PROCEDURE — 99213 OFFICE O/P EST LOW 20 MIN: CPT

## 2018-07-29 PROCEDURE — 6360000002 HC RX W HCPCS: Performed by: NURSE PRACTITIONER

## 2018-07-29 PROCEDURE — 99213 OFFICE O/P EST LOW 20 MIN: CPT | Performed by: NURSE PRACTITIONER

## 2018-07-29 PROCEDURE — 96372 THER/PROPH/DIAG INJ SC/IM: CPT

## 2018-07-29 RX ORDER — PREDNISONE 20 MG/1
40 TABLET ORAL DAILY
Qty: 10 TABLET | Refills: 0 | Status: SHIPPED | OUTPATIENT
Start: 2018-07-29 | End: 2018-08-03

## 2018-07-29 RX ORDER — METHYLPREDNISOLONE ACETATE 80 MG/ML
80 INJECTION, SUSPENSION INTRA-ARTICULAR; INTRALESIONAL; INTRAMUSCULAR; SOFT TISSUE ONCE
Status: COMPLETED | OUTPATIENT
Start: 2018-07-29 | End: 2018-07-29

## 2018-07-29 RX ORDER — ACETAMINOPHEN 160 MG
TABLET,DISINTEGRATING ORAL DAILY
COMMUNITY

## 2018-07-29 RX ADMIN — METHYLPREDNISOLONE ACETATE 80 MG: 80 INJECTION, SUSPENSION INTRA-ARTICULAR; INTRALESIONAL; INTRAMUSCULAR; SOFT TISSUE at 15:21

## 2018-07-29 ASSESSMENT — ENCOUNTER SYMPTOMS
CHEST TIGHTNESS: 0
VOMITING: 0
NAUSEA: 0
WHEEZING: 0
ABDOMINAL PAIN: 0
SHORTNESS OF BREATH: 0

## 2018-07-29 NOTE — ED PROVIDER NOTES
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 HISTORY OF PRESENT ILLNESS   Bruce Cooper is a 66 y.o. male who presents For evaluation of a pruritic rash to his bilateral hands, arms, and chest for the past 4 days. Patient reports that 6 days ago he was pulling weeds at his daughter's house and believes that he was exposed to poison ivy. Patient denies any chest pain, shortness breath, or difficulty swallowing. He denies having used any over-the-counter remedies at home other than Hydrocortone cream.    The history is provided by the patient. REVIEW OF SYSTEMS     Review of Systems   Constitutional: Negative for chills and fatigue. Respiratory: Negative for chest tightness, shortness of breath and wheezing. Cardiovascular: Negative for chest pain. Gastrointestinal: Negative for abdominal pain, nausea and vomiting. Skin: Positive for rash. PAST MEDICAL HISTORY         Diagnosis Date    BPH (benign prostatic hyperplasia)     Depression     Diabetes [facility]     Factor 5 Leiden mutation, heterozygous ([facility]     Heterozygous MTHFR mutation [id number] ([facility]     Hyperglycemia     Hypertension     Hypertension     Osteoarthritis     Sleep apnea     Stroke ([facility] 8/8/2013    Type II or unspecified type diabetes mellitus without mention of complication, not stated as uncontrolled 8/2012    Visual field cut 8/8/2013    Right visual field cut       SURGICAL HISTORY     Patient  has a past surgical history that includes Appendectomy; Colonoscopy (6/8/12); cervical fusion (08); Umbilical hernia repair (09/17/2012  Dr Sergio Medina); Abdomen surgery; and Tonsillectomy.     CURRENT MEDICATIONS       Previous Medications    AMLODIPINE-BENAZEPRIL (LOTREL) 10-40 MG PER CAPSULE

## 2018-07-31 ASSESSMENT — ENCOUNTER SYMPTOMS
EYES NEGATIVE: 1
RESPIRATORY NEGATIVE: 1
GASTROINTESTINAL NEGATIVE: 1

## 2018-07-31 NOTE — PROGRESS NOTES
[address] [address]  Dept: [phone]  Dept Fax: [phone]: [phone]  PROGRESS NOTE      Visit Date: 7/31/2018    Macario Simpson is a 66 y.o. male who presents today for:  Chief Complaint   Patient presents with    Depression     Depressed, flat affect, sleeping more, no emotion. Was started on Celexa back in October, but worse last week while on vacation. Wife states he has a family hx of depression with 2 family members dying from suicide. He denies suicidal thoughts. Subjective:  Pt presents with spouse c/o depressive mood, flat affect, no emotion, sleeping more, tearful.family history of suicide. Denies suicidal thoughts. Review of Systems   Constitutional: Negative. HENT: Negative. Eyes: Negative. Respiratory: Negative. Cardiovascular: Negative. Gastrointestinal: Negative. Endocrine: Negative. Genitourinary: Negative. Musculoskeletal: Negative. Skin: Negative. Neurological: Negative. Hematological: Negative. Psychiatric/Behavioral: Positive for decreased concentration and dysphoric mood. Negative for hallucinations and suicidal ideas.      Past Medical History:   Diagnosis Date    BPH (benign prostatic hyperplasia)     Depression     Diabetes mellitus (HCC)     Factor 5 Leiden mutation, heterozygous [facility]     Heterozygous MTHFR mutation [id number] ([facility]     Hyperglycemia     Hypertension     Hypertension     Osteoarthritis     Sleep apnea     Stroke ([facility] 8/8/2013    Type II or unspecified type diabetes mellitus without mention of complication, not stated as uncontrolled 8/2012    Visual field cut 8/8/2013    Right visual field cut      Past Surgical History:   Procedure Laterality Date    ABDOMEN SURGERY      APPENDECTOMY      CERVICAL FUSION  08    COLONOSCOPY  6/8/12    Chick Brenna REPAIR  09/17/2012   Shelly Olivo     Family History   Problem Relation Age of Onset    Diabetes Father     Diabetes Brother     Stroke Brother      Social History   Substance Use Topics    Smoking status: Former Smoker     Packs/day: 0.50     Years: 40.00     Types: E-Cigarettes     Quit date: 8/8/2013    Smokeless tobacco: Never Used      Comment: States he uses Vap    Alcohol use 0.0 oz/week      Comment: social      Current Outpatient Prescriptions   Medication Sig Dispense Refill    latanoprost (XALATAN) 0.005 % ophthalmic solution Place 1 drop into both eyes daily       VORTIoxetine (TRINTELLIX) 10 MG TABS tablet Take 10 mg by mouth daily 30 tablet 3    glimepiride (AMARYL) 2 MG tablet TAKE 1 TABLET BY MOUTH EVERY MORNING BEFORE BREAKFAST 90 tablet 0    amLODIPine-benazepril (LOTREL) 10-40 MG per capsule TAKE 1 CAPSULE BY MOUTH DAILY 90 capsule 2    metFORMIN (GLUCOPHAGE) 500 MG tablet TAKE 2 TABLETS BY MOUTH TWICE DAILY 360 tablet 1    JANUVIA 100 MG tablet TAKE 1 TABLET BY MOUTH DAILY 90 tablet 2    nebivolol (BYSTOLIC) 5 MG tablet Take 1 tablet by mouth daily 90 tablet 3    Esomeprazole Magnesium (NEXIUM 24HR PO) Take by mouth      OLIVE LEAF PO Take by mouth      aspirin 325 MG tablet Take 325 mg by mouth daily      glucose blood VI test strips (ACCU-CHEK SMARTVIEW) strip Test daily E11.9 50 strip 11    NONFORMULARY daily PROSTATE SUPPORT      multivitamin (THERAGRAN) per tablet Take 1 tablet by mouth daily.  Lancets MISC Use qd  Dx:  250.02 50 each 11    Cholecalciferol (VITAMIN D3) 2000 units CAPS Take by mouth      predniSONE (DELTASONE) 20 MG tablet Take 2 tablets by mouth daily for 5 days 10 tablet 0    cetirizine (ZYRTEC) 10 MG tablet Take 10 mg by mouth daily. No current facility-administered medications for this visit.       No Known Allergies  Health Maintenance   Topic Date Due    HIV screen  05/21/1972    DTaP/Tdap/Td vaccine (1 - Tdap) 05/21/1976    Pneumococcal med risk (1 of 1 - PPSV23) 05/21/1976    Shingles Vaccine (1 of 2 - 2 Dose Series) 05/21/2007    Diabetic foot exam  03/20/2018    Lipid screen  05/12/2018    Flu vaccine (1) 09/01/2018    A1C test (Diabetic or Prediabetic)  02/02/2019    Diabetic microalbuminuria test  02/02/2019    Potassium monitoring  02/02/2019    Creatinine monitoring  02/02/2019    Diabetic retinal exam  04/27/2019    Colon cancer screen colonoscopy  09/08/2027    Hepatitis C screen  Completed         Objective:     Physical Exam  /74   Pulse 76   Resp 16   Ht 6' 0.5\" (1.842 m)   Wt 209 lb (94.8 kg)   BMI 27.96 kg/m²       Impression/Plan:  1. Dysthymia    2. History of CVA in adulthood    3. Central sleep apnea due to Cheyne-Spence respiration      Requested Prescriptions     Signed Prescriptions Disp Refills    VORTIoxetine (TRINTELLIX) 10 MG TABS tablet 30 tablet 3     Sig: Take 10 mg by mouth daily     No orders of the defined types were placed in this encounter. Patient given educational materials - see patient instructions. Discussed use, benefit, and side effects of prescribed medications. All patient questions answered. Pt voiced understanding. Reviewed health maintenance. Patient agreed with treatment plan. Follow up as directed.           Electronically signed by ARTURO Thao CNP on 7/31/2018 at 7:52 PM

## 2018-08-13 RX ORDER — BLOOD SUGAR DIAGNOSTIC
STRIP MISCELLANEOUS
Qty: 50 STRIP | Refills: 11 | Status: SHIPPED | OUTPATIENT
Start: 2018-08-13

## 2018-08-29 ENCOUNTER — OFFICE VISIT (OUTPATIENT)
Dept: FAMILY MEDICINE CLINIC | Age: 63
End: 2018-08-29
Payer: COMMERCIAL

## 2018-08-29 VITALS
DIASTOLIC BLOOD PRESSURE: 78 MMHG | SYSTOLIC BLOOD PRESSURE: 138 MMHG | BODY MASS INDEX: 27.57 KG/M2 | TEMPERATURE: 98.5 F | WEIGHT: 208 LBS | RESPIRATION RATE: 14 BRPM | HEART RATE: 60 BPM | HEIGHT: 73 IN

## 2018-08-29 DIAGNOSIS — R41.3 MEMORY CHANGES: ICD-10-CM

## 2018-08-29 DIAGNOSIS — Z86.73 HISTORY OF ARTERIAL ISCHEMIC STROKE: ICD-10-CM

## 2018-08-29 DIAGNOSIS — Z12.5 SCREENING FOR PROSTATE CANCER: ICD-10-CM

## 2018-08-29 DIAGNOSIS — R41.0 CONFUSION: ICD-10-CM

## 2018-08-29 DIAGNOSIS — Z13.220 SCREENING CHOLESTEROL LEVEL: ICD-10-CM

## 2018-08-29 DIAGNOSIS — R06.3 CENTRAL SLEEP APNEA DUE TO CHEYNE-STOKES RESPIRATION: ICD-10-CM

## 2018-08-29 DIAGNOSIS — I10 ESSENTIAL HYPERTENSION: Chronic | ICD-10-CM

## 2018-08-29 DIAGNOSIS — E11.9 DM TYPE 2, GOAL HBA1C < 7% (HCC): Primary | ICD-10-CM

## 2018-08-29 DIAGNOSIS — F32.4 MAJOR DEPRESSIVE DISORDER IN PARTIAL REMISSION, UNSPECIFIED WHETHER RECURRENT (HCC): ICD-10-CM

## 2018-08-29 PROCEDURE — 99213 OFFICE O/P EST LOW 20 MIN: CPT | Performed by: NURSE PRACTITIONER

## 2018-08-29 NOTE — PATIENT INSTRUCTIONS
You may receive a survey about your visit with us today. The feedback from our patients helps us identify what is working well and where the service to all patients can be enhanced. Thank you! Patient Education        Preventing a Relapse of Depression: Care Instructions  Your Care Instructions    A relapse of depression means your symptoms have come back after you have gotten better. This illness often comes and goes during a lifetime. But there are many things you can do to keep it from coming back. Follow-up care is a key part of your treatment and safety. Be sure to make and go to all appointments, and call your doctor if you are having problems. It's also a good idea to know your test results and keep a list of the medicines you take. What do you need to know? Know your risk of relapse  Talk to your doctor to find out if you are at risk of relapse. Many things can make a person more likely to relapse into depression. These include having a family member with depression, dealing with serious problems in a relationship or a job, having a serious medical condition, or abusing drugs or alcohol. It is important to know your risk and to recognize warning signs of relapse. Once you know these things, you will be better able to keep it from happening to you. Know the warning signs of relapse  The two most common signs of relapse are:  · Feeling sad or hopeless. · Losing interest in your daily activities. You may have other symptoms, such as:  · You lose or gain weight. · You sleep too much or not enough. · You feel restless and unable to sit still. · You feel unable to move. · You feel tired all the time. · You feel unworthy or guilty without an obvious reason. · You have problems concentrating, remembering, or making decisions. · You think often about death or suicide. · You feel angry or have panic attacks. How can you care for yourself at home? · Take your medicine as prescribed.  Call your doctor if you have any problems with your medicine. Many people take their medicines for at least 6 months after they have recovered. This often helps keep symptoms from coming back. However, if your depression keeps coming back, you may have to take medicine for the rest of your life. · Continue counseling even after you have stopped taking medicine. · Eat healthy foods. Include fruits, vegetables, beans, and whole grains in your diet each day. · Get at least 30 minutes of exercise on most days of the week. Walking is a good choice. You also may want to do other activities, such as running, swimming, cycling, or playing tennis or team sports. · See your doctor right away if you have new symptoms or feel that your depression is coming back. · Keep a regular sleep schedule. Try for 8 hours of sleep a night. · Avoid alcohol and illegal drugs. · Keep the numbers for these national suicide hotlines: 4-310-393-TALK (5-878.633.8947) and 7-788-HPJPYJC (6-453.418.7640). If you or someone you know talks about suicide or feeling hopeless, get help right away. When should you call for help? Call 911 anytime you think you may need emergency care.  For example, call if:    · You are thinking about suicide or are threatening suicide.     · You feel you cannot stop from hurting yourself or someone else.     · You hear or see things that aren't real.     · You think or speak in a bizarre way that is not like your usual behavior.    Call your doctor now or seek immediate medical care if:    · You are drinking a lot of alcohol or using illegal drugs.     · You are talking or writing about death.    Watch closely for changes in your health, and be sure to contact your doctor if:    · You find it hard or it's getting harder to deal with school, a job, family, or friends.     · You think your treatment is not helping or you are not getting better.     · Your symptoms get worse or you get new symptoms.     · You have any problems with your antidepressant medicines, such as side effects, or you are thinking about stopping your medicine.     · You are having manic behavior, such as having very high energy, needing less sleep than normal, or showing risky behavior such as spending money you don't have or abusing others verbally or physically. Where can you learn more? Go to https://chpepiceweb.Xtellus. org and sign in to your [employer] account. Enter N011 in the Epic Playground box to learn more about \"Preventing a Relapse of Depression: Care Instructions. \"     If you do not have an account, please click on the \"Sign Up Now\" link. Current as of: December 7, 2017  Content Version: 11.7  © 9237-8900 [email or web address]. Care instructions adapted under license by [facility] ([facility] [place]). If you have questions about a medical condition or this instruction, always ask your healthcare professional. [place] [address] any warranty or liability for your use of this information. Patient Education        Depression Treatment: Care Instructions  Your Care Instructions    Depression is a condition that affects the way you feel, think, and act. It causes symptoms such as low energy, loss of interest in daily activities, and sadness or grouchiness that goes on for a long time. Depression is very common and affects men and women of all ages. Depression is a medical illness caused by changes in the natural chemicals in your brain. It is not a character flaw, and it does not mean that you are a bad or weak person. It does not mean that you are going crazy. It is important to know that depression can be treated. Medicines, counseling, and self-care can all help. Many people do not get help because they are embarrassed or think that they will get over the depression on their own. But some people do not get better without treatment. Follow-up care is a key part of your treatment and safety.  Be sure to make and go to all · You hear voices.     · You feel much more depressed.    Watch closely for changes in your health, and be sure to contact your doctor if:    · You are having problems with your depression medicine.     · You are not getting better as expected. Where can you learn more? Go to https://chpesharonaeb.Tifen.com. org and sign in to your [employer] account. Enter D115 in the Kiip box to learn more about \"Depression Treatment: Care Instructions. \"     If you do not have an account, please click on the \"Sign Up Now\" link. Current as of: December 7, 2017  Content Version: 11.7  © 7801-0522 [email or web address]. Care instructions adapted under license by [facility] ([facility]). If you have questions about a medical condition or this instruction, always ask your healthcare professional. [address] any warranty or liability for your use of this information.

## 2018-09-07 ENCOUNTER — HOSPITAL ENCOUNTER (OUTPATIENT)
Dept: INTERVENTIONAL RADIOLOGY/VASCULAR | Age: 63
Discharge: HOME | End: 2018-09-07
Payer: COMMERCIAL

## 2018-09-07 DIAGNOSIS — R41.0 CONFUSION: ICD-10-CM

## 2018-09-07 DIAGNOSIS — R41.3 MEMORY CHANGES: ICD-10-CM

## 2018-09-07 DIAGNOSIS — Z86.73 HISTORY OF ARTERIAL ISCHEMIC STROKE: ICD-10-CM

## 2018-09-07 PROCEDURE — 93880 EXTRACRANIAL BILAT STUDY: CPT

## 2018-09-11 ASSESSMENT — ENCOUNTER SYMPTOMS
EYES NEGATIVE: 1
ALLERGIC/IMMUNOLOGIC NEGATIVE: 1
GASTROINTESTINAL NEGATIVE: 1
RESPIRATORY NEGATIVE: 1

## 2018-09-11 NOTE — PROGRESS NOTES
Brother      Social History   Substance Use Topics    Smoking status: Former Smoker     Packs/day: 0.50     Years: 40.00     Types: E-Cigarettes     Quit date: 8/8/2013    Smokeless tobacco: Never Used      Comment: States he uses Vap    Alcohol use 0.0 oz/week      Comment: social      Current Outpatient Prescriptions   Medication Sig Dispense Refill    ACCU-CHEK SMARTVIEW strip TEST BLOOD SUGAR ONCE A DAY 50 strip 11    Cholecalciferol (VITAMIN D3) 2000 units CAPS Take by mouth      latanoprost (XALATAN) 0.005 % ophthalmic solution Place 1 drop into both eyes daily       VORTIoxetine (TRINTELLIX) 10 MG TABS tablet Take 10 mg by mouth daily 30 tablet 3    glimepiride (AMARYL) 2 MG tablet TAKE 1 TABLET BY MOUTH EVERY MORNING BEFORE BREAKFAST 90 tablet 0    amLODIPine-benazepril (LOTREL) 10-40 MG per capsule TAKE 1 CAPSULE BY MOUTH DAILY 90 capsule 2    metFORMIN (GLUCOPHAGE) 500 MG tablet TAKE 2 TABLETS BY MOUTH TWICE DAILY 360 tablet 1    JANUVIA 100 MG tablet TAKE 1 TABLET BY MOUTH DAILY 90 tablet 2    nebivolol (BYSTOLIC) 5 MG tablet Take 1 tablet by mouth daily 90 tablet 3    Esomeprazole Magnesium (NEXIUM 24HR PO) Take by mouth      OLIVE LEAF PO Take by mouth      aspirin 325 MG tablet Take 325 mg by mouth daily      NONFORMULARY daily PROSTATE SUPPORT      multivitamin (THERAGRAN) per tablet Take 1 tablet by mouth daily.  Lancets MISC Use qd  Dx:  250.02 50 each 11    cetirizine (ZYRTEC) 10 MG tablet Take 10 mg by mouth daily. No current facility-administered medications for this visit.       No Known Allergies  Health Maintenance   Topic Date Due    HIV screen  05/21/1972    DTaP/Tdap/Td vaccine (1 - Tdap) 05/21/1976    Pneumococcal med risk (1 of 1 - PPSV23) 05/21/1976    Shingles Vaccine (1 of 2 - 2 Dose Series) 05/21/2007    Diabetic foot exam  03/20/2018    Lipid screen  05/12/2018    Flu vaccine (1) 09/01/2018    A1C test (Diabetic or Prediabetic)  02/02/2019   

## 2018-10-12 ENCOUNTER — OFFICE VISIT (OUTPATIENT)
Dept: CARDIOLOGY CLINIC | Age: 63
End: 2018-10-12
Payer: COMMERCIAL

## 2018-10-12 VITALS
WEIGHT: 209 LBS | HEIGHT: 72 IN | DIASTOLIC BLOOD PRESSURE: 78 MMHG | HEART RATE: 70 BPM | BODY MASS INDEX: 28.31 KG/M2 | SYSTOLIC BLOOD PRESSURE: 128 MMHG

## 2018-10-12 DIAGNOSIS — I10 ESSENTIAL HYPERTENSION: Primary | Chronic | ICD-10-CM

## 2018-10-12 PROCEDURE — 99213 OFFICE O/P EST LOW 20 MIN: CPT | Performed by: NUCLEAR MEDICINE

## 2018-10-12 PROCEDURE — 93000 ELECTROCARDIOGRAM COMPLETE: CPT | Performed by: NUCLEAR MEDICINE

## 2018-10-12 RX ORDER — GLIMEPIRIDE 2 MG/1
TABLET ORAL
Qty: 90 TABLET | Refills: 2 | Status: SHIPPED | OUTPATIENT
Start: 2018-10-12 | End: 2019-08-02 | Stop reason: SDUPTHER

## 2018-10-12 NOTE — PROGRESS NOTES
[address] CARDIOLOGY  [address]  [address] [address]  Dept: [phone]  Dept Fax: [phone]  Loc: [phone]    Visit Date: 10/12/2018    Ivana Man is a 66 y.o. male who presents today for:  Chief Complaint   Patient presents with    Check-Up    Diabetes    Hypertension     No known CAD  BP a little higher today   Better at home  No chest pain  No changes in breathing  Bs is stable     HPI:  HPI  Past Medical History:   Diagnosis Date    BPH (benign prostatic hyperplasia)     Depression     [facility]     [facility], [facility]     Heterozygous MTHFR mutation [id number] ([facility]     Hyperglycemia     Hypertension     Hypertension     Osteoarthritis     Sleep apnea     Stroke ([facility] 8/8/2013    Type II or unspecified type diabetes mellitus without mention of complication, not stated as uncontrolled 8/2012    Visual field cut 8/8/2013    Right visual field cut      Past Surgical History:   Procedure Laterality Date    ABDOMEN SURGERY      APPENDECTOMY      CERVICAL FUSION  08    COLONOSCOPY  6/8/12    [facility]    TONSILLECTOMY      UMBILICAL HERNIA REPAIR  09/17/2012  Dr Dayanna Rocha     Family History   Problem Relation Age of Onset    Diabetes Father     Diabetes Brother     Stroke Brother      Social History   Substance Use Topics    Smoking status: Former Smoker     Packs/day: 0.50     Years: 40.00     Types: E-Cigarettes     Quit date: 8/8/2013    Smokeless tobacco: Never Used      Comment: States he uses Vap    Alcohol use 0.0 oz/week      Comment: social      Current Outpatient Prescriptions   Medication Sig Dispense Refill    Omega-3 Fatty Acids (OMEGA 3 500 PO) Take by mouth      ACCU-CHEK SMARTVIEW strip TEST BLOOD SUGAR ONCE A DAY 50 strip 11    Cholecalciferol (VITAMIN D3) 2000 units CAPS Take by mouth      latanoprost (XALATAN) 0.005 % ophthalmic solution Place 1 drop into

## 2018-10-16 RX ORDER — IBUPROFEN 200 MG
TABLET ORAL
Qty: 90 TABLET | Refills: 3 | Status: SHIPPED | OUTPATIENT
Start: 2018-10-16 | End: 2019-08-16 | Stop reason: SDUPTHER

## 2018-10-20 LAB
ABSOLUTE BASO #: 0.1 K/UL (ref 0–0.1)
ABSOLUTE EOS #: 0.1 K/UL (ref 0.1–0.4)
ABSOLUTE LYMPH #: 1.8 K/UL (ref 0.8–5.2)
ABSOLUTE MONO #: 0.5 K/UL (ref 0.1–0.9)
ABSOLUTE NEUT #: 2.6 K/UL (ref 1.3–9.1)
ALBUMIN SERPL-MCNC: 4.8 G/DL (ref 3.5–5.2)
ALK PHOSPHATASE: 71 U/L (ref 39–118)
ALT SERPL-CCNC: 65 U/L (ref 5–50)
ANION GAP SERPL CALCULATED.3IONS-SCNC: 17 MEQ/L (ref 10–19)
AST SERPL-CCNC: 33 U/L (ref 9–50)
AVERAGE GLUCOSE: 169 MG/DL (ref 66–114)
BASOPHILS RELATIVE PERCENT: 1.6 %
BILIRUB SERPL-MCNC: 1 MG/DL
BUN BLDV-MCNC: 14 MG/DL (ref 8–23)
CALCIUM SERPL-MCNC: 9.4 MG/DL (ref 8.5–10.5)
CHLORIDE BLD-SCNC: 99 MEQ/L (ref 95–107)
CHOLESTEROL/HDL RATIO: 3.6
CHOLESTEROL: 193 MG/DL
CO2: 24 MEQ/L (ref 19–31)
CREAT SERPL-MCNC: 0.8 MG/DL (ref 0.8–1.4)
EGFR AFRICAN AMERICAN: 111.7 ML/MIN/1.73 M2
EGFR IF NONAFRICAN AMERICAN: 96.4 ML/MIN/1.73 M2
EOSINOPHILS RELATIVE PERCENT: 2.5 %
GLUCOSE: 167 MG/DL (ref 70–99)
HBA1C MFR BLD: 7.5 % (ref 4.2–5.8)
HCT VFR BLD CALC: 46.5 % (ref 41.4–51)
HDLC SERPL-MCNC: 53.6 MG/DL
HEMOGLOBIN: 16 G/DL (ref 13.8–17)
LDL CHOLESTEROL CALCULATED: 130 MG/DL
LDL/HDL RATIO: 2.4
LYMPHOCYTE %: 34.2 %
MCH RBC QN AUTO: 31.9 PG (ref 27–34)
MCHC RBC AUTO-ENTMCNC: 34.4 G/DL (ref 31–36)
MCV RBC AUTO: 92.8 FL (ref 80–100)
MONOCYTES # BLD: 10.2 %
NEUTROPHILS RELATIVE PERCENT: 51.3 %
PDW BLD-RTO: 12.6 % (ref 10.8–14.8)
PLATELETS: 269 K/UL (ref 150–450)
POTASSIUM SERPL-SCNC: 4.2 MEQ/L (ref 3.5–5.4)
PSA, ULTRASENSITIVE: 1.38 NG/ML
RBC: 5.01 M/UL (ref 4–5.5)
SODIUM BLD-SCNC: 140 MEQ/L (ref 135–146)
TOTAL PROTEIN: 6.9 G/DL (ref 6.1–8.3)
TRIGL SERPL-MCNC: 45 MG/DL
VLDLC SERPL CALC-MCNC: 9 MG/DL
WBC: 5.1 K/UL (ref 3.7–10.8)

## 2018-10-30 ENCOUNTER — TELEPHONE (OUTPATIENT)
Dept: FAMILY MEDICINE CLINIC | Age: 63
End: 2018-10-30

## 2018-10-30 DIAGNOSIS — R73.09 ELEVATED HEMOGLOBIN A1C: ICD-10-CM

## 2018-10-30 DIAGNOSIS — R74.8 ELEVATED LIVER ENZYMES: Primary | ICD-10-CM

## 2018-12-13 RX ORDER — NEBIVOLOL HYDROCHLORIDE 5 MG/1
5 TABLET ORAL DAILY
Qty: 90 TABLET | Refills: 3 | Status: SHIPPED | OUTPATIENT
Start: 2018-12-13 | End: 2019-08-16

## 2018-12-31 RX ORDER — SITAGLIPTIN 100 MG/1
100 TABLET, FILM COATED ORAL DAILY
Qty: 90 TABLET | Refills: 2 | Status: SHIPPED | OUTPATIENT
Start: 2018-12-31 | End: 2019-08-16 | Stop reason: SDUPTHER

## 2019-01-04 ENCOUNTER — TELEPHONE (OUTPATIENT)
Dept: FAMILY MEDICINE CLINIC | Age: 64
End: 2019-01-04

## 2019-02-26 RX ORDER — AMLODIPINE BESYLATE AND BENAZEPRIL HYDROCHLORIDE 10; 40 MG/1; MG/1
1 CAPSULE ORAL DAILY
Qty: 90 CAPSULE | Refills: 1 | Status: SHIPPED | OUTPATIENT
Start: 2019-02-26 | End: 2019-08-16 | Stop reason: SDUPTHER

## 2019-03-02 LAB
ALT SERPL-CCNC: 50 U/L (ref 5–50)
AST SERPL-CCNC: 27 U/L (ref 9–50)
AVERAGE GLUCOSE: 148 MG/DL (ref 66–114)
HBA1C MFR BLD: 6.8 %

## 2019-07-18 RX ORDER — VORTIOXETINE 10 MG/1
TABLET, FILM COATED ORAL
Qty: 30 TABLET | Refills: 0 | Status: SHIPPED | OUTPATIENT
Start: 2019-07-18 | End: 2019-08-16 | Stop reason: SDUPTHER

## 2019-08-02 DIAGNOSIS — E11.9 DM TYPE 2, GOAL HBA1C < 7% (HCC): Primary | ICD-10-CM

## 2019-08-02 DIAGNOSIS — I10 ESSENTIAL HYPERTENSION: ICD-10-CM

## 2019-08-02 DIAGNOSIS — Z12.5 SCREENING FOR PROSTATE CANCER: ICD-10-CM

## 2019-08-02 RX ORDER — GLIMEPIRIDE 2 MG/1
TABLET ORAL
Qty: 30 TABLET | Refills: 0 | Status: SHIPPED | OUTPATIENT
Start: 2019-08-02 | End: 2019-08-16 | Stop reason: SDUPTHER

## 2019-08-02 NOTE — TELEPHONE ENCOUNTER
Lab orders faxed to [facility] on [address].

## 2019-08-10 LAB
ALBUMIN SERPL-MCNC: 4.7 G/DL (ref 3.2–5.3)
ALK PHOSPHATASE: 78 U/L (ref 39–130)
ALT SERPL-CCNC: 71 U/L (ref 0–40)
ANION GAP SERPL CALCULATED.3IONS-SCNC: 12 MMOL/L (ref 4–12)
AST SERPL-CCNC: 33 U/L (ref 0–41)
AVERAGE GLUCOSE: 192 MG/DL
BILIRUB SERPL-MCNC: 1.1 MG/DL (ref 0.3–1.2)
BUN BLDV-MCNC: 12 MG/DL (ref 5–27)
CALCIUM SERPL-MCNC: 9.5 MG/DL (ref 8.5–10.5)
CHLORIDE BLD-SCNC: 100 MMOL/L (ref 98–109)
CHOLESTEROL/HDL RATIO: 3.9 (ref 1–5)
CHOLESTEROL: 196 MG/DL (ref 150–200)
CO2: 26 MMOL/L (ref 22–32)
CREAT SERPL-MCNC: 0.83 MG/DL (ref 0.6–1.3)
EGFR AFRICAN AMERICAN: >60 ML/MIN/1.73SQ.M
EGFR IF NONAFRICAN AMERICAN: >60 ML/MIN/1.73SQ.M
GLUCOSE: 198 MG/DL (ref 65–99)
HBA1C MFR BLD: 8.3 % (ref 4.4–6.4)
HDLC SERPL-MCNC: 50 MG/DL
LDL CHOLESTEROL CALCULATED: 131 MG/DL
LDL/HDL RATIO: 2.6
POTASSIUM SERPL-SCNC: 5.3 MMOL/L (ref 3.5–5)
PSA, ULTRASENSITIVE: 1.06 NG/ML (ref 0–4)
SODIUM BLD-SCNC: 138 MMOL/L (ref 134–146)
TOTAL PROTEIN: 6.9 G/DL (ref 6–8)
TRIGL SERPL-MCNC: 74 MG/DL (ref 27–150)
VLDLC SERPL CALC-MCNC: 15 MG/DL (ref 0–30)

## 2019-08-16 ENCOUNTER — OFFICE VISIT (OUTPATIENT)
Dept: FAMILY MEDICINE CLINIC | Age: 64
End: 2019-08-16
Payer: COMMERCIAL

## 2019-08-16 VITALS
BODY MASS INDEX: 26.37 KG/M2 | WEIGHT: 199 LBS | RESPIRATION RATE: 12 BRPM | TEMPERATURE: 98.5 F | HEIGHT: 73 IN | SYSTOLIC BLOOD PRESSURE: 124 MMHG | HEART RATE: 96 BPM | DIASTOLIC BLOOD PRESSURE: 76 MMHG

## 2019-08-16 DIAGNOSIS — R74.8 ELEVATED LIVER ENZYMES: ICD-10-CM

## 2019-08-16 DIAGNOSIS — R06.3 CENTRAL SLEEP APNEA DUE TO CHEYNE-STOKES RESPIRATION: ICD-10-CM

## 2019-08-16 DIAGNOSIS — R73.09 ELEVATED HEMOGLOBIN A1C: ICD-10-CM

## 2019-08-16 DIAGNOSIS — I10 ESSENTIAL HYPERTENSION: ICD-10-CM

## 2019-08-16 DIAGNOSIS — Z86.59 HISTORY OF DEPRESSION: ICD-10-CM

## 2019-08-16 DIAGNOSIS — E11.9 DM TYPE 2, GOAL HBA1C < 7% (HCC): Primary | ICD-10-CM

## 2019-08-16 PROCEDURE — 99214 OFFICE O/P EST MOD 30 MIN: CPT | Performed by: NURSE PRACTITIONER

## 2019-08-16 RX ORDER — GLIMEPIRIDE 2 MG/1
TABLET ORAL
Qty: 90 TABLET | Refills: 3 | Status: SHIPPED | OUTPATIENT
Start: 2019-08-16 | End: 2020-02-28

## 2019-08-16 RX ORDER — AMLODIPINE BESYLATE AND BENAZEPRIL HYDROCHLORIDE 10; 40 MG/1; MG/1
1 CAPSULE ORAL DAILY
Qty: 90 CAPSULE | Refills: 3 | Status: SHIPPED | OUTPATIENT
Start: 2019-08-16 | End: 2019-11-13 | Stop reason: RX

## 2019-08-16 RX ORDER — SPIRONOLACT/HYDROCHLOROTHIAZID 25 MG-25MG
TABLET ORAL
COMMUNITY

## 2019-08-16 NOTE — PROGRESS NOTES
Immunizations Administered     Name Date Dose Route    Pneumococcal Conjugate 13-valent ([id number]) 8/16/2019 0.5 mL Intramuscular    Lot: [id number]    NDC: 4100-7327-13

## 2019-08-20 ASSESSMENT — ENCOUNTER SYMPTOMS
PHOTOPHOBIA: 0
RHINORRHEA: 0
ABDOMINAL DISTENTION: 0
SHORTNESS OF BREATH: 0
EYE DISCHARGE: 0
BACK PAIN: 0
WHEEZING: 0
EYE PAIN: 0
COUGH: 0
ABDOMINAL PAIN: 0
CHEST TIGHTNESS: 0
APNEA: 0

## 2019-08-20 NOTE — PROGRESS NOTES
negative.     Component      Latest Ref Rng & Units 8/9/2019 8/9/2019 8/9/2019 8/9/2019          11:19 AM 11:19 AM 11:19 AM 11:19 AM   Glucose      65 - 99 mg/dL   198 (H)    BUN      5 - 27 mg/dL   12    Creatinine      0.60 - 1.30 mg/dL   0.83    eGFR African American      >59 ml/min/1.73sq.m   >60    EGFR IF NonAfrican American      >59 ml/min/1.73sq.m   >60    Calcium      8.5 - 10.5 mg/dL   9.5    Sodium      134 - 146 mmol/L   138    Potassium      3.5 - 5.0 mmol/L   5.3 (H)    Chloride      98 - 109 mmol/L   100    CO2      22 - 32 mmol/L   26    Anion Gap      4 - 12 mmol/L   12    Bilirubin      0.3 - 1.2 mg/dL   1.1    Alk Phosphatase      39 - 130 U/L   78    AST      0 - 41 U/L   33    ALT      0 - 40 U/L   71 (H)    Total Protein      6.0 - 8.0 g/dL   6.9    Albumin      3.2 - 5.3 g/dL   4.7    Cholesterol      150 - 200 mg/dL    196   Triglycerides      27 - 150 mg/dL    74   HDL Cholesterol      >39 mg/dL    50   LDL Calculated      <130 mg/dL    131 (H)   VLDL Cholesterol Calculated      0 - 30 mg/dL    15   LDl/HDL Ratio          2.6   Chol/HDL Ratio      1.0 - 5.0    3.9   Hemoglobin A1C      4.4 - 6.4 % 8.3 (H)      AVERAGE GLUCOSE      mg/dL 192      PSA, Ultrasensitive      0.00 - 4.00 ng/mL  1.06       Past Medical History:   Diagnosis Date    BPH (benign prostatic hyperplasia)     Depression     Diabetes mellitus (HCC)     Factor [facility] Leiden [facility], [facility]     Heterozygous MTHFR mutation [id number] ([facility]     Hyperglycemia     Hypertension     Hypertension     Osteoarthritis     Sleep apnea     Stroke ([facility] 8/8/2013    Type II or unspecified type diabetes mellitus without mention of complication, not stated as uncontrolled 8/2012    Visual field cut 8/8/2013    Right visual field cut      Past Surgical History:   Procedure Laterality Date    ABDOMEN SURGERY      APPENDECTOMY      CERVICAL FUSION  08    COLONOSCOPY  6/8/12    [facility]    TONSILLECTOMY      UMBILICAL Conjunctivae, EOM and lids are normal. Right conjunctiva is not injected. Left conjunctiva is not injected. No scleral icterus. Right eye exhibits normal extraocular motion. Left eye exhibits normal extraocular motion. Neck: Trachea normal and normal range of motion. Neck supple. No JVD present. Carotid bruit is not present. No edema, no erythema and normal range of motion present. No thyroid mass and no thyromegaly present. Cardiovascular: Normal rate, regular rhythm, S1 normal, S2 normal, normal heart sounds and intact distal pulses. Exam reveals no gallop and no friction rub. No murmur heard. Pulmonary/Chest: Effort normal and breath sounds normal. No respiratory distress. He has no wheezes. He has no rhonchi. He has no rales. He exhibits no tenderness. Abdominal: Soft. Normal appearance and bowel sounds are normal. He exhibits no mass. There is no hepatosplenomegaly, splenomegaly or hepatomegaly. There is no rebound and no guarding. No hernia. Hernia confirmed negative in the ventral area, confirmed negative in the right inguinal area and confirmed negative in the left inguinal area. Musculoskeletal: Normal range of motion. He exhibits no edema or tenderness. Lymphadenopathy:        Head (right side): No submental, no submandibular, no tonsillar, no preauricular, no posterior auricular and no occipital adenopathy present. Head (left side): No submental, no submandibular, no tonsillar, no preauricular, no posterior auricular and no occipital adenopathy present. He has no cervical adenopathy. Right cervical: No superficial cervical, no deep cervical and no posterior cervical adenopathy present. Left cervical: No superficial cervical, no deep cervical and no posterior cervical adenopathy present. Right axillary: No pectoral and no lateral adenopathy present. Left axillary: No pectoral and no lateral adenopathy present.        Right: No inguinal and no

## 2019-09-20 ENCOUNTER — OFFICE VISIT (OUTPATIENT)
Dept: FAMILY MEDICINE CLINIC | Age: 64
End: 2019-09-20
Payer: COMMERCIAL

## 2019-09-20 VITALS
SYSTOLIC BLOOD PRESSURE: 130 MMHG | TEMPERATURE: 98.6 F | WEIGHT: 200.2 LBS | BODY MASS INDEX: 26.41 KG/M2 | DIASTOLIC BLOOD PRESSURE: 74 MMHG | HEART RATE: 64 BPM | RESPIRATION RATE: 16 BRPM

## 2019-09-20 DIAGNOSIS — E11.9 DM TYPE 2, GOAL HBA1C < 7% (HCC): ICD-10-CM

## 2019-09-20 DIAGNOSIS — J30.9 ALLERGIC RHINITIS, UNSPECIFIED SEASONALITY, UNSPECIFIED TRIGGER: ICD-10-CM

## 2019-09-20 DIAGNOSIS — H65.93 BILATERAL OTITIS MEDIA WITH EFFUSION: Primary | ICD-10-CM

## 2019-09-20 DIAGNOSIS — J06.9 UPPER RESPIRATORY TRACT INFECTION, UNSPECIFIED TYPE: ICD-10-CM

## 2019-09-20 PROCEDURE — 99214 OFFICE O/P EST MOD 30 MIN: CPT | Performed by: NURSE PRACTITIONER

## 2019-09-20 PROCEDURE — 96372 THER/PROPH/DIAG INJ SC/IM: CPT | Performed by: NURSE PRACTITIONER

## 2019-09-20 RX ORDER — PREDNISONE 10 MG/1
10 TABLET ORAL 2 TIMES DAILY
Qty: 10 TABLET | Refills: 0 | Status: SHIPPED | OUTPATIENT
Start: 2019-09-20 | End: 2019-09-25

## 2019-09-20 RX ORDER — CETIRIZINE HYDROCHLORIDE 10 MG/1
10 TABLET ORAL DAILY
Qty: 30 TABLET | Refills: 0 | Status: SHIPPED | OUTPATIENT
Start: 2019-09-20 | End: 2019-11-19

## 2019-09-20 RX ORDER — AZITHROMYCIN 250 MG/1
TABLET, FILM COATED ORAL
Qty: 6 TABLET | Refills: 0 | Status: SHIPPED | OUTPATIENT
Start: 2019-09-20 | End: 2019-09-30

## 2019-09-20 RX ORDER — METHYLPREDNISOLONE ACETATE 80 MG/ML
120 INJECTION, SUSPENSION INTRA-ARTICULAR; INTRALESIONAL; INTRAMUSCULAR; SOFT TISSUE ONCE
Status: COMPLETED | OUTPATIENT
Start: 2019-09-20 | End: 2019-09-20

## 2019-09-20 RX ADMIN — METHYLPREDNISOLONE ACETATE 120 MG: 80 INJECTION, SUSPENSION INTRA-ARTICULAR; INTRALESIONAL; INTRAMUSCULAR; SOFT TISSUE at 11:17

## 2019-09-20 ASSESSMENT — ENCOUNTER SYMPTOMS
RHINORRHEA: 1
COUGH: 0
SINUS PAIN: 0
PHOTOPHOBIA: 0
ABDOMINAL PAIN: 0
SINUS PRESSURE: 0
SHORTNESS OF BREATH: 0
BACK PAIN: 0
ABDOMINAL DISTENTION: 0
EYE DISCHARGE: 0
SORE THROAT: 0
CHEST TIGHTNESS: 0
APNEA: 0
EYE PAIN: 0
WHEEZING: 1

## 2019-10-25 ENCOUNTER — OFFICE VISIT (OUTPATIENT)
Dept: CARDIOLOGY CLINIC | Age: 64
End: 2019-10-25
Payer: COMMERCIAL

## 2019-10-25 VITALS
HEART RATE: 65 BPM | WEIGHT: 198 LBS | DIASTOLIC BLOOD PRESSURE: 72 MMHG | HEIGHT: 72 IN | BODY MASS INDEX: 26.82 KG/M2 | SYSTOLIC BLOOD PRESSURE: 146 MMHG

## 2019-10-25 DIAGNOSIS — R06.09 DYSPNEA ON EXERTION: ICD-10-CM

## 2019-10-25 DIAGNOSIS — F17.200 SMOKING: ICD-10-CM

## 2019-10-25 DIAGNOSIS — I10 ESSENTIAL HYPERTENSION: Primary | ICD-10-CM

## 2019-10-25 PROCEDURE — 93000 ELECTROCARDIOGRAM COMPLETE: CPT | Performed by: NUCLEAR MEDICINE

## 2019-10-25 PROCEDURE — 99214 OFFICE O/P EST MOD 30 MIN: CPT | Performed by: NUCLEAR MEDICINE

## 2019-11-13 RX ORDER — AMLODIPINE BESYLATE 10 MG/1
10 TABLET ORAL DAILY
Qty: 90 TABLET | Refills: 3 | Status: SHIPPED | OUTPATIENT
Start: 2019-11-13 | End: 2020-08-17 | Stop reason: SDUPTHER

## 2019-11-13 RX ORDER — BENAZEPRIL HYDROCHLORIDE 40 MG/1
40 TABLET, FILM COATED ORAL DAILY
Qty: 90 TABLET | Refills: 3 | Status: SHIPPED | OUTPATIENT
Start: 2019-11-13 | End: 2020-08-17 | Stop reason: SDUPTHER

## 2019-11-15 ENCOUNTER — HOSPITAL ENCOUNTER (OUTPATIENT)
Dept: NON INVASIVE DIAGNOSTICS | Age: 64
Discharge: HOME | End: 2019-11-15
Payer: COMMERCIAL

## 2019-11-15 VITALS — BODY MASS INDEX: 27.09 KG/M2 | HEIGHT: 72 IN | WEIGHT: 200 LBS

## 2019-11-15 DIAGNOSIS — F17.200 SMOKING: ICD-10-CM

## 2019-11-15 DIAGNOSIS — R06.09 DYSPNEA ON EXERTION: ICD-10-CM

## 2019-11-15 DIAGNOSIS — I10 ESSENTIAL HYPERTENSION: ICD-10-CM

## 2019-11-15 LAB
LV EF: 58 %
LV EF: 60 %
LVEF MODALITY: NORMAL
LVEF MODALITY: NORMAL

## 2019-11-15 PROCEDURE — 93306 TTE W/DOPPLER COMPLETE: CPT

## 2019-11-15 PROCEDURE — 93017 CV STRESS TEST TRACING ONLY: CPT | Performed by: NUCLEAR MEDICINE

## 2019-11-15 PROCEDURE — A9500 TC99M SESTAMIBI: HCPCS | Performed by: NUCLEAR MEDICINE

## 2019-11-15 PROCEDURE — 3430000000 HC RX DIAGNOSTIC RADIOPHARMACEUTICAL: Performed by: NUCLEAR MEDICINE

## 2019-11-15 PROCEDURE — 2709999900 HC NON-CHARGEABLE SUPPLY

## 2019-11-15 PROCEDURE — 78452 HT MUSCLE IMAGE SPECT MULT: CPT

## 2019-11-15 RX ADMIN — Medication 8.5 MILLICURIE: at 09:50

## 2019-11-15 RX ADMIN — Medication 29.2 MILLICURIE: at 10:50

## 2019-12-06 ENCOUNTER — HOSPITAL ENCOUNTER (OUTPATIENT)
Dept: PULMONOLOGY | Age: 64
End: 2019-12-06
Payer: COMMERCIAL

## 2019-12-20 ENCOUNTER — HOSPITAL ENCOUNTER (OUTPATIENT)
Dept: PULMONOLOGY | Age: 64
Discharge: HOME | End: 2019-12-20
Payer: COMMERCIAL

## 2019-12-20 PROCEDURE — 94726 PLETHYSMOGRAPHY LUNG VOLUMES: CPT

## 2019-12-20 PROCEDURE — 94060 EVALUATION OF WHEEZING: CPT

## 2019-12-20 PROCEDURE — 94729 DIFFUSING CAPACITY: CPT

## 2019-12-24 ENCOUNTER — TELEPHONE (OUTPATIENT)
Dept: CARDIOLOGY CLINIC | Age: 64
End: 2019-12-24

## 2020-01-10 ENCOUNTER — OFFICE VISIT (OUTPATIENT)
Dept: FAMILY MEDICINE CLINIC | Age: 65
End: 2020-01-10
Payer: COMMERCIAL

## 2020-01-10 VITALS
TEMPERATURE: 98.3 F | DIASTOLIC BLOOD PRESSURE: 68 MMHG | RESPIRATION RATE: 14 BRPM | WEIGHT: 200 LBS | HEART RATE: 68 BPM | SYSTOLIC BLOOD PRESSURE: 130 MMHG | BODY MASS INDEX: 27.12 KG/M2

## 2020-01-10 PROCEDURE — 99213 OFFICE O/P EST LOW 20 MIN: CPT | Performed by: NURSE PRACTITIONER

## 2020-01-10 RX ORDER — PREDNISONE 1 MG/1
TABLET ORAL
Qty: 30 TABLET | Refills: 0 | Status: SHIPPED | OUTPATIENT
Start: 2020-01-10 | End: 2020-01-20

## 2020-01-10 RX ORDER — TRIAMCINOLONE ACETONIDE 55 UG/1
2 SPRAY, METERED NASAL DAILY
Qty: 1 INHALER | Refills: 0 | COMMUNITY
Start: 2020-01-10 | End: 2020-08-17 | Stop reason: SDUPTHER

## 2020-01-10 RX ORDER — CEFUROXIME AXETIL 250 MG/1
250 TABLET ORAL 2 TIMES DAILY
Qty: 20 TABLET | Refills: 0 | Status: SHIPPED | OUTPATIENT
Start: 2020-01-10 | End: 2021-03-22 | Stop reason: SDUPTHER

## 2020-01-10 RX ORDER — FEXOFENADINE HCL 180 MG/1
180 TABLET ORAL DAILY
Qty: 10 TABLET | Refills: 0 | COMMUNITY
Start: 2020-01-10 | End: 2021-03-05 | Stop reason: SDUPTHER

## 2020-01-10 SDOH — ECONOMIC STABILITY: TRANSPORTATION INSECURITY
IN THE PAST 12 MONTHS, HAS THE LACK OF TRANSPORTATION KEPT YOU FROM MEDICAL APPOINTMENTS OR FROM GETTING MEDICATIONS?: NO

## 2020-01-10 SDOH — ECONOMIC STABILITY: INCOME INSECURITY: HOW HARD IS IT FOR YOU TO PAY FOR THE VERY BASICS LIKE FOOD, HOUSING, MEDICAL CARE, AND HEATING?: NOT HARD AT ALL

## 2020-01-10 SDOH — ECONOMIC STABILITY: FOOD INSECURITY: WITHIN THE PAST 12 MONTHS, YOU WORRIED THAT YOUR FOOD WOULD RUN OUT BEFORE YOU GOT MONEY TO BUY MORE.: NEVER TRUE

## 2020-01-10 SDOH — ECONOMIC STABILITY: FOOD INSECURITY: WITHIN THE PAST 12 MONTHS, THE FOOD YOU BOUGHT JUST DIDN'T LAST AND YOU DIDN'T HAVE MONEY TO GET MORE.: NEVER TRUE

## 2020-01-10 SDOH — ECONOMIC STABILITY: TRANSPORTATION INSECURITY
IN THE PAST 12 MONTHS, HAS LACK OF TRANSPORTATION KEPT YOU FROM MEETINGS, WORK, OR FROM GETTING THINGS NEEDED FOR DAILY LIVING?: NO

## 2020-01-15 ASSESSMENT — ENCOUNTER SYMPTOMS
COUGH: 1
RHINORRHEA: 1
SINUS PAIN: 1
GASTROINTESTINAL NEGATIVE: 1
EYES NEGATIVE: 1
SINUS PRESSURE: 1

## 2020-01-15 NOTE — PROGRESS NOTES
strip TEST BLOOD SUGAR ONCE A DAY 50 strip 11    Cholecalciferol (VITAMIN D3) 2000 units CAPS Take by mouth      latanoprost (XALATAN) 0.005 % ophthalmic solution Place 1 drop into both eyes daily       Esomeprazole Magnesium (NEXIUM 24HR PO) Take by mouth      OLIVE LEAF PO Take by mouth      NONFORMULARY daily PROSTATE SUPPORT      multivitamin (THERAGRAN) per tablet Take 1 tablet by mouth daily.  Lancets MISC Use qd  Dx:  250.02 50 each 11    cetirizine (ZYRTEC) 10 MG tablet Take 10 mg by mouth daily. No current facility-administered medications for this visit. No Known Allergies  Health Maintenance   Topic Date Due    DTaP/Tdap/Td vaccine (1 - Tdap) 05/21/1968    HIV screen  05/21/1972    Shingles Vaccine (1 of 2) 05/21/2007    Diabetic microalbuminuria test  02/02/2019    Diabetic retinal exam  04/27/2019    Flu vaccine (1) 09/01/2019    Pneumococcal 0-64 years Vaccine (1 of 1 - PPSV23) 10/11/2019    A1C test (Diabetic or Prediabetic)  08/09/2020    Lipid screen  08/09/2020    Potassium monitoring  08/09/2020    Creatinine monitoring  08/09/2020    Diabetic foot exam  09/20/2020    Colon cancer screen colonoscopy  09/08/2027    Hepatitis C screen  Completed         Objective:     Physical Exam  Vitals signs and nursing note reviewed. Constitutional:       Appearance: Normal appearance. He is well-developed. He is not diaphoretic. HENT:      Head: Normocephalic and atraumatic. Not macrocephalic and not microcephalic. Right Ear: Hearing, ear canal and external ear normal. Tenderness present. No drainage. A middle ear effusion is present. No hemotympanum. Tympanic membrane is erythematous. Tympanic membrane is not injected, scarred, perforated or bulging. Left Ear: Hearing, ear canal and external ear normal. No drainage or tenderness. A middle ear effusion is present. No hemotympanum. Tympanic membrane is not injected, scarred, perforated or bulging. by mouth daily    cefUROXime (CEFTIN) 250 MG tablet 20 tablet 0     Sig: Take 1 tablet by mouth 2 times daily for 10 days     No orders of the defined types were placed in this encounter. Patient giveneducational materials - see patient instructions. Discussed use, benefit, and side effects of prescribed medications. All patient questions answered. Pt voiced understanding. Reviewed health maintenance. Patient agreedwith treatment plan. Follow up as directed. Continue medications as prescribed.  Educational information on above diagnosis  provided per AVS.      Electronically signed by ARTURO Chapman CNP on 1/15/2020 at 8:35 AM

## 2020-02-13 ENCOUNTER — TELEPHONE (OUTPATIENT)
Dept: FAMILY MEDICINE CLINIC | Age: 65
End: 2020-02-13

## 2020-02-13 RX ORDER — PROMETHAZINE HYDROCHLORIDE 25 MG/1
25 TABLET ORAL 3 TIMES DAILY PRN
Qty: 12 TABLET | Refills: 0 | Status: SHIPPED | OUTPATIENT
Start: 2020-02-13 | End: 2020-02-20

## 2020-02-13 NOTE — TELEPHONE ENCOUNTER
Wife has norovirus and now he is very nauseated, feels like he could get sick. Asking for something for nausea.      [facility]

## 2020-02-22 LAB
ALT SERPL-CCNC: 50 U/L (ref 0–40)
AST SERPL-CCNC: 25 U/L (ref 0–41)
AVERAGE GLUCOSE: 192 MG/DL
HBA1C MFR BLD: 8.3 % (ref 4.4–6.4)

## 2020-02-25 ENCOUNTER — TELEPHONE (OUTPATIENT)
Dept: FAMILY MEDICINE CLINIC | Age: 65
End: 2020-02-25

## 2020-02-25 NOTE — TELEPHONE ENCOUNTER
----- Message from ARTURO Shore CNP sent at 2/25/2020  8:38 AM EST -----  a1c remains elevated at 8.3/poor control. Recommend follow-up appointment to discuss possible weekly injections for his diabetes.   Possible discontinuation of oral diabetic meds

## 2020-02-25 NOTE — TELEPHONE ENCOUNTER
Spoke with patient regarding results. Patient verbalized understanding. Appointment scheduled for 2- with Radha Astudillo and will discuss in detail at appointment.

## 2020-02-28 ENCOUNTER — OFFICE VISIT (OUTPATIENT)
Dept: FAMILY MEDICINE CLINIC | Age: 65
End: 2020-02-28
Payer: COMMERCIAL

## 2020-02-28 VITALS
SYSTOLIC BLOOD PRESSURE: 136 MMHG | BODY MASS INDEX: 26.91 KG/M2 | TEMPERATURE: 97.9 F | HEART RATE: 70 BPM | DIASTOLIC BLOOD PRESSURE: 68 MMHG | WEIGHT: 198.4 LBS | RESPIRATION RATE: 16 BRPM

## 2020-02-28 PROCEDURE — 3052F HG A1C>EQUAL 8.0%<EQUAL 9.0%: CPT | Performed by: NURSE PRACTITIONER

## 2020-02-28 PROCEDURE — 99214 OFFICE O/P EST MOD 30 MIN: CPT | Performed by: NURSE PRACTITIONER

## 2020-02-28 NOTE — PATIENT INSTRUCTIONS
Patient Education        Learning About Diabetes Food Guidelines  Your Care Instructions    Meal planning is important to manage diabetes. It helps keep your blood sugar at a target level (which you set with your doctor). You don't have to eat special foods. You can eat what your family eats, including sweets once in a while. But you do have to pay attention to how often you eat and how much you eat of certain foods. You may want to work with a dietitian or a certified diabetes educator (CDE) to help you plan meals and snacks. A dietitian or CDE can also help you lose weight if that is one of your goals. What should you know about eating carbs? Managing the amount of carbohydrate (carbs) you eat is an important part of healthy meals when you have diabetes. Carbohydrate is found in many foods. · Learn which foods have carbs. And learn the amounts of carbs in different foods. ? Bread, cereal, pasta, and rice have about 15 grams of carbs in a serving. A serving is 1 slice of bread (1 ounce), ½ cup of cooked cereal, or 1/3 cup of cooked pasta or rice. ? Fruits have 15 grams of carbs in a serving. A serving is 1 small fresh fruit, such as an apple or orange; ½ of a banana; ½ cup of cooked or canned fruit; ½ cup of fruit juice; 1 cup of melon or raspberries; or 2 tablespoons of dried fruit. ? Milk and no-sugar-added yogurt have 15 grams of carbs in a serving. A serving is 1 cup of milk or 2/3 cup of no-sugar-added yogurt. ? Starchy vegetables have 15 grams of carbs in a serving. A serving is ½ cup of mashed potatoes or sweet potato; 1 cup winter squash; ½ of a small baked potato; ½ cup of cooked beans; or ½ cup cooked corn or green peas. · Learn how much carbs to eat each day and at each meal. A dietitian or CDE can teach you how to keep track of the amount of carbs you eat. This is called carbohydrate counting. · If you are not sure how to count carbohydrate grams, use the Plate Method to plan meals.  It is a good, quick way to make sure that you have a balanced meal. It also helps you spread carbs throughout the day. ? Divide your plate by types of foods. Put non-starchy vegetables on half the plate, meat or other protein food on one-quarter of the plate, and a grain or starchy vegetable in the final quarter of the plate. To this you can add a small piece of fruit and 1 cup of milk or yogurt, depending on how many carbs you are supposed to eat at a meal.  · Try to eat about the same amount of carbs at each meal. Do not \"save up\" your daily allowance of carbs to eat at one meal.  · Proteins have very little or no carbs per serving. Examples of proteins are beef, chicken, turkey, fish, eggs, tofu, cheese, cottage cheese, and peanut butter. A serving size of meat is 3 ounces, which is about the size of a deck of cards. Examples of meat substitute serving sizes (equal to 1 ounce of meat) are 1/4 cup of cottage cheese, 1 egg, 1 tablespoon of peanut butter, and ½ cup of tofu. How can you eat out and still eat healthy? · Learn to estimate the serving sizes of foods that have carbohydrate. If you measure food at home, it will be easier to estimate the amount in a serving of restaurant food. · If the meal you order has too much carbohydrate (such as potatoes, corn, or baked beans), ask to have a low-carbohydrate food instead. Ask for a salad or green vegetables. · If you use insulin, check your blood sugar before and after eating out to help you plan how much to eat in the future. · If you eat more carbohydrate at a meal than you had planned, take a walk or do other exercise. This will help lower your blood sugar. What else should you know? · Limit saturated fat, such as the fat from meat and dairy products. This is a healthy choice because people who have diabetes are at higher risk of heart disease. So choose lean cuts of meat and nonfat or low-fat dairy products.  Use olive or canola oil instead of butter or shortening when cooking. · Don't skip meals. Your blood sugar may drop too low if you skip meals and take insulin or certain medicines for diabetes. · Check with your doctor before you drink alcohol. Alcohol can cause your blood sugar to drop too low. Alcohol can also cause a bad reaction if you take certain diabetes medicines. Follow-up care is a key part of your treatment and safety. Be sure to make and go to all appointments, and call your doctor if you are having problems. It's also a good idea to know your test results and keep a list of the medicines you take. Where can you learn more? Go to https://chpepiceweb.[place]. org and sign in to your Medudem account. Enter N185 in the Photorank box to learn more about \"Learning About Diabetes Food Guidelines. \"     If you do not have an account, please click on the \"Sign Up Now\" link. Current as of: April 16, 2019  Content Version: 12.3  © 2632-3174 cartmi. Care instructions adapted under license by [facility] ([facility]). If you have questions about a medical condition or this instruction, always ask your healthcare professional. [place] [address] any warranty or liability for your use of this information. Patient Education        Learning About Meal Planning for Diabetes  Why plan your meals? Meal planning can be a key part of managing diabetes. Planning meals and snacks with the right balance of carbohydrate, protein, and fat can help you keep your blood sugar at the target level you set with your doctor. You don't have to eat special foods. You can eat what your family eats, including sweets once in a while. But you do have to pay attention to how often you eat and how much you eat of certain foods. You may want to work with a dietitian or a certified diabetes educator. He or she can give you tips and meal ideas and can answer your questions about meal planning.  This health professional can also help you reach a healthy weight if that is one of your goals. What plan is right for you? Your dietitian or diabetes educator may suggest that you start with the plate format or carbohydrate counting. The plate format  The plate format is a simple way to help you manage how you eat. You plan meals by learning how much space each food should take on a plate. Using the plate format helps you spread carbohydrate throughout the day. It can make it easier to keep your blood sugar level within your target range. It also helps you see if you're eating healthy portion sizes. To use the plate format, you put non-starchy vegetables on half your plate. Add meat or meat substitutes on one-quarter of the plate. Put a grain or starchy vegetable (such as brown rice or a potato) on the final quarter of the plate. You can add a small piece of fruit and some low-fat or fat-free milk or yogurt, depending on your carbohydrate goal for each meal.  Here are some tips for using the plate format:  · Make sure that you are not using an oversized plate. A 9-inch plate is best. Many restaurants use larger plates. · Get used to using the plate format at home. Then you can use it when you eat out. · Write down your questions about using the plate format. Talk to your doctor, a dietitian, or a diabetes educator about your concerns. Carbohydrate counting  With carbohydrate counting, you plan meals based on the amount of carbohydrate in each food. Carbohydrate raises blood sugar higher and more quickly than any other nutrient. It is found in desserts, breads and cereals, and fruit. It's also found in starchy vegetables such as potatoes and corn, grains such as rice and pasta, and milk and yogurt. Spreading carbohydrate throughout the day helps keep your blood sugar levels within your target range.   Your daily amount depends on several things, including your weight, how active you are, which diabetes medicines you take, and what your goals are for healthy. That's because these diets often don't include healthy foods like fruits and vegetables. Losing weight safely means balancing protein, fat, and carbs with every meal and snack. And low-carb diets don't always provide the vitamins, minerals, and fiber you need. If you have a serious medical condition, talk to your doctor before you try any diet. These conditions include kidney disease, heart disease, type 2 diabetes, high cholesterol, and high blood pressure. If you are pregnant, it may not be safe for your baby if you are on a low-carb diet. How can you lose weight safely? You might have heard that a diet plan helped another person lose weight. But that doesn't mean that it will work for you. It is very hard to stay on a diet that includes lots of big changes in your eating habits. If you want to get to a healthy weight and stay there, making healthy lifestyle changes will often work better than dieting. These steps can help. · Make a plan for change. Work with your doctor to create a plan that is right for you. · See a dietitian. He or she can show you how to make healthy changes in your eating habits. · Manage stress. If you have a lot of stress in your life, it can be hard to focus on making healthy changes to your daily habits. · Track your food and activity. You are likely to do better at losing weight if you keep track of what you eat and what you do. Follow-up care is a key part of your treatment and safety. Be sure to make and go to all appointments, and call your doctor if you are having problems. It's also a good idea to know your test results and keep a list of the medicines you take. Where can you learn more? Go to https://philly.HackerEarth. org and sign in to your Netli account. Enter A121 in the Gauzy box to learn more about \"Learning About Low-Carbohydrate Diets for Weight Loss. \"     If you do not have an account, please click on the \"Sign Up Now\" link.  Current as of: August 21, 2019  Content Version: 12.3  © 8104-4228 Healthwise, Incorporated. Care instructions adapted under license by [facility] ([facility]). If you have questions about a medical condition or this instruction, always ask your healthcare professional. [address] any warranty or liability for your use of this information.

## 2020-02-28 NOTE — PROGRESS NOTES
History     Tobacco Use    Smoking status: Former Smoker     Packs/day: 0.50     Years: 40.00     Pack years: 20.00     Types: E-Cigarettes     Last attempt to quit: 2013     Years since quittin.5    Smokeless tobacco: Never Used    Tobacco comment: States he uses Vap   Substance Use Topics    Alcohol use: Yes     Alcohol/week: 0.0 standard drinks     Comment: social      Current Outpatient Medications   Medication Sig Dispense Refill    Semaglutide,0.25 or 0.5MG/DOS, 2 MG/1.5ML SOPN Inject 0.5 mg into the skin once a week 1 pen 3    triamcinolone (NASACORT ALLERGY 24HR) 55 MCG/ACT nasal inhaler 2 sprays by Each Nostril route daily 1 Inhaler 0    amLODIPine (NORVASC) 10 MG tablet Take 1 tablet by mouth daily 90 tablet 3    benazepril (LOTENSIN) 40 MG tablet Take 1 tablet by mouth daily 90 tablet 3    Apple Sudhir Vn-Grn Tea-Bit Or-Cr (APPLE CIDER VINEGAR PLUS) TABS Take by mouth      VORTIoxetine (TRINTELLIX) 10 MG TABS tablet take 1 tablet by mouth daily 90 tablet 3    SITagliptin (JANUVIA) 100 MG tablet Take 1 tablet by mouth daily 90 tablet 3    aspirin (RA ASPIRIN EC) 325 MG EC tablet take 1 tablet by mouth once daily 90 tablet 3    ACCU-CHEK SMARTVIEW strip TEST BLOOD SUGAR ONCE A DAY 50 strip 11    Cholecalciferol (VITAMIN D3) 2000 units CAPS Take by mouth      latanoprost (XALATAN) 0.005 % ophthalmic solution Place 1 drop into both eyes daily       Esomeprazole Magnesium (NEXIUM 24HR PO) Take by mouth      OLIVE LEAF PO Take by mouth      NONFORMULARY daily PROSTATE SUPPORT      multivitamin (THERAGRAN) per tablet Take 1 tablet by mouth daily.  Lancets MISC Use qd  Dx:  250.02 50 each 11    cetirizine (ZYRTEC) 10 MG tablet Take 10 mg by mouth daily. No current facility-administered medications for this visit.       No Known Allergies  Health Maintenance   Topic Date Due    DTaP/Tdap/Td vaccine (1 - Tdap) 1968    HIV screen  1972    Hepatitis B vaccine (1 of 3 - Risk 3-dose series) 05/21/1976    Shingles Vaccine (1 of 2) 05/21/2007    Diabetic microalbuminuria test  02/02/2019    Flu vaccine (1) 09/01/2019    Pneumococcal 0-64 years Vaccine (1 of 1 - PPSV23) 10/11/2019    Lipid screen  08/09/2020    Potassium monitoring  08/09/2020    Creatinine monitoring  08/09/2020    Diabetic foot exam  09/20/2020    A1C test (Diabetic or Prediabetic)  02/21/2021    Diabetic retinal exam  02/21/2021    Colon cancer screen colonoscopy  09/08/2027    Hepatitis C screen  Completed    Hepatitis A vaccine  Aged Out    Hib vaccine  Aged Out    Meningococcal (ACWY) vaccine  Aged Out         Objective:     Physical Exam  Vitals signs and nursing note reviewed. Constitutional:       Appearance: Normal appearance. He is well-developed. He is not diaphoretic. HENT:      Head: Normocephalic and atraumatic. Not macrocephalic and not microcephalic. Right Ear: Hearing, tympanic membrane, ear canal and external ear normal. No drainage or tenderness. No middle ear effusion. No hemotympanum. Tympanic membrane is not injected, scarred, perforated or bulging. Left Ear: Hearing, tympanic membrane, ear canal and external ear normal. No drainage or tenderness. No middle ear effusion. No hemotympanum. Tympanic membrane is not injected, scarred, perforated or bulging. Nose: Nose normal. No nasal deformity, septal deviation, mucosal edema or rhinorrhea. Right Sinus: No maxillary sinus tenderness or frontal sinus tenderness. Left Sinus: No maxillary sinus tenderness or frontal sinus tenderness. Mouth/Throat:      Mouth: Mucous membranes are moist. No oral lesions. Dentition: Normal dentition. Does not have dentures. No dental caries or dental abscesses. Pharynx: No oropharyngeal exudate or posterior oropharyngeal erythema. Tonsils: No tonsillar abscesses. Eyes:      General: Lids are normal. No scleral icterus.      Extraocular Movements:

## 2020-04-03 RX ORDER — LANCETS 30 GAUGE
1 EACH MISCELLANEOUS 2 TIMES DAILY
Qty: 100 EACH | Refills: 0 | Status: SHIPPED | OUTPATIENT
Start: 2020-04-03 | End: 2020-05-26

## 2020-04-03 RX ORDER — GLUCOSAMINE HCL/CHONDROITIN SU 500-400 MG
CAPSULE ORAL
Qty: 100 STRIP | Refills: 0 | Status: SHIPPED | OUTPATIENT
Start: 2020-04-03 | End: 2020-05-26

## 2020-05-26 RX ORDER — BLOOD SUGAR DIAGNOSTIC
STRIP MISCELLANEOUS
Qty: 100 STRIP | Refills: 3 | Status: SHIPPED | OUTPATIENT
Start: 2020-05-26

## 2020-05-26 RX ORDER — GLUCOSAM/CHON-MSM1/C/MANG/BOSW 500-416.6
TABLET ORAL
Qty: 100 EACH | Refills: 3 | Status: SHIPPED | OUTPATIENT
Start: 2020-05-26

## 2020-06-04 ENCOUNTER — TELEPHONE (OUTPATIENT)
Dept: PULMONOLOGY | Age: 65
End: 2020-06-04

## 2020-06-09 ENCOUNTER — TELEPHONE (OUTPATIENT)
Dept: FAMILY MEDICINE CLINIC | Age: 65
End: 2020-06-09

## 2020-06-09 RX ORDER — BLOOD-GLUCOSE METER
1 KIT MISCELLANEOUS DAILY
Qty: 1 KIT | Refills: 0 | Status: SHIPPED | OUTPATIENT
Start: 2020-06-09 | End: 2020-08-17

## 2020-06-20 LAB
ALBUMIN SERPL-MCNC: 4.5 G/DL (ref 3.2–5.3)
ALK PHOSPHATASE: 84 U/L (ref 39–130)
ALT SERPL-CCNC: 32 U/L (ref 0–40)
ANION GAP SERPL CALCULATED.3IONS-SCNC: 10 MMOL/L (ref 5–15)
AST SERPL-CCNC: 20 U/L (ref 0–41)
AVERAGE GLUCOSE: 192 MG/DL
BILIRUB SERPL-MCNC: 0.8 MG/DL (ref 0.3–1.2)
BUN BLDV-MCNC: 13 MG/DL (ref 5–27)
CALCIUM SERPL-MCNC: 9.5 MG/DL (ref 8.5–10.5)
CHLORIDE BLD-SCNC: 104 MMOL/L (ref 98–109)
CO2: 27 MMOL/L (ref 22–32)
CREAT SERPL-MCNC: 0.75 MG/DL (ref 0.6–1.3)
EGFR AFRICAN AMERICAN: >60 ML/MIN/1.73SQ.M
EGFR IF NONAFRICAN AMERICAN: >60 ML/MIN/1.73SQ.M
GLUCOSE: 187 MG/DL (ref 65–99)
HBA1C MFR BLD: 8.3 % (ref 4.4–6.4)
POTASSIUM SERPL-SCNC: 3.7 MMOL/L (ref 3.5–5)
SODIUM BLD-SCNC: 141 MMOL/L (ref 134–146)
TOTAL PROTEIN: 6.7 G/DL (ref 6–8)

## 2020-06-23 ENCOUNTER — TELEPHONE (OUTPATIENT)
Dept: PULMONOLOGY | Age: 65
End: 2020-06-23

## 2020-06-24 ENCOUNTER — TELEPHONE (OUTPATIENT)
Dept: FAMILY MEDICINE CLINIC | Age: 65
End: 2020-06-24

## 2020-06-24 NOTE — TELEPHONE ENCOUNTER
[facility] received a script for Ozempic 0.5 pen and he will be taking 1 mg once a week now. Asking for an Ozempic 1 mg pen to be sent instead.

## 2020-06-24 NOTE — PROGRESS NOTES
2013     Years since quittin.8    Smokeless tobacco: Never Used    Tobacco comment: States he uses Vap   Substance Use Topics    Alcohol use: Yes     Alcohol/week: 0.0 standard drinks     Comment: social    Drug use: No       No Known Allergies    Current Outpatient Medications   Medication Sig Dispense Refill    Semaglutide, 1 MG/DOSE, 2 MG/1.5ML SOPN Inject 1 mg into the skin once a week 4 pen 11    glucose monitoring kit (FREESTYLE) monitoring kit 1 kit by Does not apply route daily Accu check meter Dx: E11.9 1 kit 0    TRUEplus Lancets 33G MISC USE TWICE A  each 3    blood glucose test strips (ACCU-CHEK SMARTVIEW) strip CHECK BLOOD SUGAR TWICE A DAY AS NEEDED FOR SYMPTOMS OF IRREGULAR BLOOD GLUCOSE 100 strip 3    triamcinolone (NASACORT ALLERGY 24HR) 55 MCG/ACT nasal inhaler 2 sprays by Each Nostril route daily 1 Inhaler 0    amLODIPine (NORVASC) 10 MG tablet Take 1 tablet by mouth daily 90 tablet 3    benazepril (LOTENSIN) 40 MG tablet Take 1 tablet by mouth daily 90 tablet 3    Apple Sudhir Vn-Grn Tea-Bit Or-Cr (APPLE CIDER VINEGAR PLUS) TABS Take by mouth      VORTIoxetine (TRINTELLIX) 10 MG TABS tablet take 1 tablet by mouth daily 90 tablet 3    SITagliptin (JANUVIA) 100 MG tablet Take 1 tablet by mouth daily 90 tablet 3    aspirin (RA ASPIRIN EC) 325 MG EC tablet take 1 tablet by mouth once daily 90 tablet 3    ACCU-CHEK SMARTVIEW strip TEST BLOOD SUGAR ONCE A DAY 50 strip 11    Cholecalciferol (VITAMIN D3) 2000 units CAPS Take by mouth      latanoprost (XALATAN) 0.005 % ophthalmic solution Place 1 drop into both eyes daily       Esomeprazole Magnesium (NEXIUM 24HR PO) Take by mouth      OLIVE LEAF PO Take by mouth      NONFORMULARY daily PROSTATE SUPPORT      multivitamin (THERAGRAN) per tablet Take 1 tablet by mouth daily.  Lancets MISC Use qd  Dx:  250.02 50 each 11    cetirizine (ZYRTEC) 10 MG tablet Take 10 mg by mouth daily.          No current

## 2020-06-26 ENCOUNTER — VIRTUAL VISIT (OUTPATIENT)
Dept: PULMONOLOGY | Age: 65
End: 2020-06-26
Payer: COMMERCIAL

## 2020-06-26 PROCEDURE — 99213 OFFICE O/P EST LOW 20 MIN: CPT | Performed by: PHYSICIAN ASSISTANT

## 2020-06-26 ASSESSMENT — ENCOUNTER SYMPTOMS
EYES NEGATIVE: 1
STRIDOR: 0
COUGH: 0
ALLERGIC/IMMUNOLOGIC NEGATIVE: 1
DIARRHEA: 0
CHEST TIGHTNESS: 0
WHEEZING: 0
NAUSEA: 0
SHORTNESS OF BREATH: 0
BACK PAIN: 0

## 2020-08-17 ENCOUNTER — OFFICE VISIT (OUTPATIENT)
Dept: FAMILY MEDICINE CLINIC | Age: 65
End: 2020-08-17
Payer: COMMERCIAL

## 2020-08-17 VITALS
WEIGHT: 185 LBS | DIASTOLIC BLOOD PRESSURE: 82 MMHG | TEMPERATURE: 97.9 F | BODY MASS INDEX: 23.74 KG/M2 | HEART RATE: 68 BPM | RESPIRATION RATE: 12 BRPM | HEIGHT: 74 IN | SYSTOLIC BLOOD PRESSURE: 138 MMHG

## 2020-08-17 PROCEDURE — 99214 OFFICE O/P EST MOD 30 MIN: CPT | Performed by: NURSE PRACTITIONER

## 2020-08-17 PROCEDURE — 96372 THER/PROPH/DIAG INJ SC/IM: CPT | Performed by: NURSE PRACTITIONER

## 2020-08-17 PROCEDURE — 3052F HG A1C>EQUAL 8.0%<EQUAL 9.0%: CPT | Performed by: NURSE PRACTITIONER

## 2020-08-17 RX ORDER — AMLODIPINE BESYLATE 10 MG/1
10 TABLET ORAL DAILY
Qty: 90 TABLET | Refills: 3 | Status: SHIPPED | OUTPATIENT
Start: 2020-08-17 | End: 2020-11-16

## 2020-08-17 RX ORDER — BENAZEPRIL HYDROCHLORIDE 40 MG/1
40 TABLET, FILM COATED ORAL DAILY
Qty: 90 TABLET | Refills: 3 | Status: SHIPPED | OUTPATIENT
Start: 2020-08-17 | End: 2020-11-16

## 2020-08-17 RX ORDER — METHYLPREDNISOLONE ACETATE 80 MG/ML
120 INJECTION, SUSPENSION INTRA-ARTICULAR; INTRALESIONAL; INTRAMUSCULAR; SOFT TISSUE ONCE
Status: COMPLETED | OUTPATIENT
Start: 2020-08-17 | End: 2020-08-17

## 2020-08-17 RX ORDER — BLOOD-GLUCOSE METER
EACH MISCELLANEOUS
COMMUNITY
Start: 2020-06-09

## 2020-08-17 RX ORDER — TRIAMCINOLONE ACETONIDE 55 UG/1
2 SPRAY, METERED NASAL DAILY
Qty: 1 INHALER | Refills: 2 | Status: ON HOLD | OUTPATIENT
Start: 2020-08-17 | End: 2022-05-19 | Stop reason: HOSPADM

## 2020-08-17 RX ADMIN — METHYLPREDNISOLONE ACETATE 120 MG: 80 INJECTION, SUSPENSION INTRA-ARTICULAR; INTRALESIONAL; INTRAMUSCULAR; SOFT TISSUE at 08:44

## 2020-08-17 NOTE — PROGRESS NOTES
Administrations This Visit     methylPREDNISolone acetate (DEPO-MEDROL) injection 120 mg     Admin Date  08/17/2020  08:44 Action  Given Dose  120 mg Route  Intramuscular Site  Dorsogluteal Left Administered By  Tonio Vazquez CMA ([address] [place] [address])    Ordering Provider:  ARTURO Elkins CNP    NDC:  8999-4579-13    Lot#:  [id number]    :  82Lee Ji.     Patient Supplied?:  No

## 2020-08-18 ASSESSMENT — ENCOUNTER SYMPTOMS
EYE ITCHING: 1
EYE REDNESS: 1
RHINORRHEA: 1

## 2020-08-18 NOTE — PROGRESS NOTES
[address] [place] [facility] [address]  Dept: [phone]  Dept Fax: [phone]  Loc: [phone]  PROGRESS NOTE      VisitDate: 8/17/2020    Marty Gusman is a 63 y.o. male who presents today for:     Chief Complaint   Patient presents with    Allergies     Runny nose, congestions, sneezing, runny eyes. Requesting Depo Medrol. Last received Depo Medrol 120 9-2019. Subjective:  Patient presents complaining of postnasal drainage, sinus congestion, sneezing, irritated itchy eyes for the past week. Patient patient requesting Depo-Medrol injection. History of depression, type 2 diabetes, factor V, hypertension, osteoarthritis, sleep apnea, CVA BPH, right visual field loss. Patient reports mood stable. Taking medications as prescribed. Reports blood sugar in check. Denies any fever, illness, dizziness, syncope, headaches, chest pain, shortness of breath, abdominal pain, rash, edema or numbness. Review of Systems   HENT: Positive for congestion, postnasal drip, rhinorrhea and sneezing. Eyes: Positive for redness and itching. Psychiatric/Behavioral: Positive for decreased concentration and dysphoric mood. All other systems reviewed and are negative.     Past Medical History:   Diagnosis Date    BPH (benign prostatic hyperplasia)     Depression     Diabetes mellitus ([address]     Factor 5 Leiden mutation, heterozygous ([address]     Heterozygous MTHFR mutation [id number] ([address]     Hyperglycemia     Hypertension     Hypertension     Osteoarthritis     Sleep apnea     Stroke ([address] 8/8/2013    Type II or unspecified type diabetes mellitus without mention of complication, not stated as uncontrolled 8/2012    Visual field cut 8/8/2013    Right visual field cut      Past Surgical History:   Procedure Laterality Date    ABDOMEN SURGERY      APPENDECTOMY      CERVICAL FUSION  08    COLONOS[facility]  6/8/12    [facility]    TONSILLECTOMY      UMBILICAL HERNIA REPAIR  2012  Dr Rey Echols     Family History   Problem Relation Age of Onset    Diabetes Father     Diabetes Brother     Stroke Brother      Social History     Tobacco Use    Smoking status: Former Smoker     Packs/day: 0.50     Years: 40.00     Pack years: 20.00     Types: E-Cigarettes     Last attempt to quit: 2013     Years since quittin.0    Smokeless tobacco: Never Used    Tobacco comment: States he uses Vap   Substance Use Topics    Alcohol use:  Yes     Alcohol/week: 0.0 standard drinks     Comment: social      Current Outpatient Medications   Medication Sig Dispense Refill    Blood Glucose Monitoring Suppl (ACCU-CHEK GUIDE) w/Device KIT CHECK BLOOD SUGAR DAILY      VORTIoxetine (TRINTELLIX) 10 MG TABS tablet take 1 tablet by mouth daily 90 tablet 3    triamcinolone (NASACORT ALLERGY 24HR) 55 MCG/ACT nasal inhaler 2 sprays by Each Nostril route daily 1 Inhaler 2    amLODIPine (NORVASC) 10 MG tablet Take 1 tablet by mouth daily 90 tablet 3    benazepril (LOTENSIN) 40 MG tablet Take 1 tablet by mouth daily 90 tablet 3    SITagliptin (JANUVIA) 100 MG tablet Take 1 tablet by mouth daily 90 tablet 3    Semaglutide, 1 MG/DOSE, 2 MG/1.5ML SOPN Inject 1 mg into the skin once a week 4 pen 11    TRUEplus Lancets 33G MISC USE TWICE A  each 3    blood glucose test strips (ACCU-CHEK SMARTVIEW) strip CHECK BLOOD SUGAR TWICE A DAY AS NEEDED FOR SYMPTOMS OF IRREGULAR BLOOD GLUCOSE 100 strip 3    Apple Sudhir Vn-Grn Tea-Bit Or-Cr (APPLE CIDER VINEGAR PLUS) TABS Take by mouth      aspirin (RA ASPIRIN EC) 325 MG EC tablet take 1 tablet by mouth once daily 90 tablet 3    ACCU-CHEK SMARTVIEW strip TEST BLOOD SUGAR ONCE A DAY 50 strip 11    Cholecalciferol (VITAMIN D3) 2000 units CAPS Take by mouth      latanoprost (XALATAN) 0.005 % ophthalmic solution Place 1 drop into both eyes daily       Esomeprazole Magnesium (NEXIUM 24HR PO) Take by mouth      OLIVE LEAF PO Take by mouth      NONFORMULARY daily PROSTATE SUPPORT      multivitamin (THERAGRAN) per tablet Take 1 tablet by mouth daily.  Lancets MISC Use qd  Dx:  250.02 50 each 11    cetirizine (ZYRTEC) 10 MG tablet Take 10 mg by mouth daily. No current facility-administered medications for this visit. No Known Allergies  Health Maintenance   Topic Date Due    HIV screen  05/21/1972    DTaP/Tdap/Td vaccine (1 - Tdap) 05/21/1976    Shingles Vaccine (1 of 2) 05/21/2007    Diabetic microalbuminuria test  02/02/2019    Pneumococcal 0-64 years Vaccine (1 of 1 - PPSV23) 10/11/2019    Lipid screen  08/09/2020    Flu vaccine (1) 09/01/2020    Diabetic foot exam  09/20/2020    Diabetic retinal exam  02/21/2021    A1C test (Diabetic or Prediabetic)  06/19/2021    Potassium monitoring  06/19/2021    Creatinine monitoring  06/19/2021    Colon cancer screen colonoscopy  09/08/2027    Hepatitis C screen  Completed    Hepatitis A vaccine  Aged Out    Hib vaccine  Aged Out    Meningococcal (ACWY) vaccine  Aged Out         Objective:     Physical Exam  Vitals signs and nursing note reviewed. Constitutional:       Appearance: Normal appearance. He is well-developed and normal weight. He is not diaphoretic. HENT:      Head: Normocephalic and atraumatic. Not macrocephalic and not microcephalic. Right Ear: Hearing, tympanic membrane, ear canal and external ear normal. No drainage or tenderness. No middle ear effusion. No hemotympanum. Tympanic membrane is not injected, scarred, perforated or bulging. Left Ear: Hearing, tympanic membrane, ear canal and external ear normal. No drainage or tenderness. No middle ear effusion. No hemotympanum. Tympanic membrane is not injected, scarred, perforated or bulging. Nose: Mucosal edema and rhinorrhea present. No nasal deformity or septal deviation. Right Sinus: No maxillary sinus tenderness or frontal sinus tenderness.       Left Sinus: No maxillary sinus tenderness or frontal sinus tenderness. Mouth/Throat:      Mouth: No oral lesions. Dentition: Normal dentition. Does not have dentures. No dental caries or dental abscesses. Pharynx: No oropharyngeal exudate or posterior oropharyngeal erythema. Tonsils: No tonsillar abscesses. Eyes:      General: Lids are normal. No scleral icterus. Extraocular Movements:      Right eye: Normal extraocular motion. Left eye: Normal extraocular motion. Conjunctiva/sclera:      Right eye: Right conjunctiva is injected. Left eye: Left conjunctiva is injected. Neck:      Musculoskeletal: Normal range of motion and neck supple. Normal range of motion. No edema or erythema. Thyroid: No thyroid mass or thyromegaly. Vascular: No carotid bruit or JVD. Trachea: Trachea normal.   Cardiovascular:      Rate and Rhythm: Normal rate and regular rhythm. Heart sounds: Normal heart sounds, S1 normal and S2 normal. No murmur. No friction rub. No gallop. Pulmonary:      Effort: Pulmonary effort is normal. No respiratory distress. Breath sounds: Normal breath sounds. No wheezing, rhonchi or rales. Chest:      Chest wall: No tenderness. Abdominal:      General: Bowel sounds are normal.      Palpations: Abdomen is soft. There is no hepatomegaly, splenomegaly or mass. Tenderness: There is no guarding or rebound. Hernia: No hernia is present. There is no hernia in the ventral area or left inguinal area. Musculoskeletal: Normal range of motion. General: No tenderness. Lymphadenopathy:      Head:      Right side of head: No submental, submandibular, tonsillar, preauricular, posterior auricular or occipital adenopathy. Left side of head: No submental, submandibular, tonsillar, preauricular, posterior auricular or occipital adenopathy. Cervical: No cervical adenopathy.       Right cervical: No superficial, deep or posterior cervical adenopathy. Left cervical: No superficial, deep or posterior cervical adenopathy. Upper Body:      Right upper body: No supraclavicular or pectoral adenopathy. Left upper body: No supraclavicular or pectoral adenopathy. Skin:     General: Skin is warm and dry. Coloration: Skin is not pale. Findings: No bruising, ecchymosis, laceration, lesion or rash. Nails: There is no clubbing. Neurological:      Mental Status: He is alert and oriented to person, place, and time. Cranial Nerves: No cranial nerve deficit. Motor: No abnormal muscle tone. Coordination: Coordination normal.      Deep Tendon Reflexes: Reflexes normal.   Psychiatric:         Mood and Affect: Mood normal.         Speech: Speech normal.         Behavior: Behavior normal.         Thought Content: Thought content normal.         Judgment: Judgment normal.       /82   Pulse 68   Temp 97.9 °F (36.6 °C) (Infrared)   Resp 12   Ht 6' 1.5\" (1.867 m)   Wt 185 lb (83.9 kg)   BMI 24.08 kg/m²       Impression/Plan:  1. Screening for prostate cancer    2. DM type 2, goal HbA1c < 7% ([facility])    3. Essential hypertension    4. Allergic rhinitis, unspecified seasonality, unspecified trigger    5. PND (post-nasal drip)    6.  Screening cholesterol level      Requested Prescriptions     Signed Prescriptions Disp Refills    VORTIoxetine (TRINTELLIX) 10 MG TABS tablet 90 tablet 3     Sig: take 1 tablet by mouth daily    triamcinolone (NASACORT ALLERGY 24HR) 55 MCG/ACT nasal inhaler 1 Inhaler 2     Si sprays by Each Nostril route daily    amLODIPine (NORVASC) 10 MG tablet 90 tablet 3     Sig: Take 1 tablet by mouth daily    benazepril (LOTENSIN) 40 MG tablet 90 tablet 3     Sig: Take 1 tablet by mouth daily    SITagliptin (JANUVIA) 100 MG tablet 90 tablet 3     Sig: Take 1 tablet by mouth daily     Orders Placed This Encounter   Procedures    Hemoglobin A1C     Standing Status:   Future     Standing Expiration Date:   8/17/2021    Comprehensive Metabolic Panel     Standing Status:   Future     Standing Expiration Date:   8/17/2021    Lipid Panel     Standing Status:   Future     Standing Expiration Date:   8/17/2021     Order Specific Question:   Is Patient Fasting?/# of Hours     Answer:   yes/12    Psa screening     Standing Status:   Future     Standing Expiration Date:   8/17/2021       Patient giveneducational materials - see patient instructions. Discussed use, benefit, and side effects of prescribed medications. All patient questions answered. Pt voiced understanding. Reviewed health maintenance. Patient agreedwith treatment plan. Follow up as directed. Continue medications as prescribed. Educational information on above diagnosis  provided per AVS.  Depo-Medrol injection given in office. Surgical rhinitis/conjunctivitis information provided patient. Medications as prescribed. Continue medications as prescribed.  Educational information on above diagnosis  provided per AVS.      Electronically signed by ARTURO Parsons CNP on 8/18/2020 at 7:59 AM

## 2020-09-08 RX ORDER — ASPIRIN 325 MG
TABLET, DELAYED RELEASE (ENTERIC COATED) ORAL
Qty: 90 TABLET | Refills: 3 | Status: SHIPPED | OUTPATIENT
Start: 2020-09-08 | End: 2021-09-20

## 2020-09-19 LAB
ALBUMIN SERPL-MCNC: 4.4 G/DL (ref 3.2–5.3)
ALK PHOSPHATASE: 90 U/L (ref 39–130)
ALT SERPL-CCNC: 35 U/L (ref 0–40)
ANION GAP SERPL CALCULATED.3IONS-SCNC: 9 MMOL/L (ref 5–15)
AST SERPL-CCNC: 23 U/L (ref 0–41)
AVERAGE GLUCOSE: 209 MG/DL
BILIRUB SERPL-MCNC: 0.8 MG/DL (ref 0.3–1.2)
BUN BLDV-MCNC: 9 MG/DL (ref 5–27)
CALCIUM SERPL-MCNC: 9.2 MG/DL (ref 8.5–10.5)
CHLORIDE BLD-SCNC: 99 MMOL/L (ref 98–109)
CHOLESTEROL/HDL RATIO: 4 (ref 1–5)
CHOLESTEROL: 187 MG/DL (ref 150–200)
CO2: 29 MMOL/L (ref 22–32)
CREAT SERPL-MCNC: 0.74 MG/DL (ref 0.6–1.3)
EGFR AFRICAN AMERICAN: >60 ML/MIN/1.73SQ.M
EGFR IF NONAFRICAN AMERICAN: >60 ML/MIN/1.73SQ.M
GLUCOSE: 177 MG/DL (ref 65–99)
HBA1C MFR BLD: 8.9 % (ref 4.4–6.4)
HDLC SERPL-MCNC: 47 MG/DL
LDL CHOLESTEROL CALCULATED: 130 MG/DL
LDL/HDL RATIO: 2.8
POTASSIUM SERPL-SCNC: 4 MMOL/L (ref 3.5–5)
PSA, ULTRASENSITIVE: 2.27 NG/ML (ref 0–4)
SODIUM BLD-SCNC: 137 MMOL/L (ref 134–146)
TOTAL PROTEIN: 6.9 G/DL (ref 6–8)
TRIGL SERPL-MCNC: 49 MG/DL (ref 27–150)
VLDLC SERPL CALC-MCNC: 10 MG/DL (ref 0–30)

## 2020-09-25 ENCOUNTER — TELEPHONE (OUTPATIENT)
Dept: FAMILY MEDICINE CLINIC | Age: 65
End: 2020-09-25

## 2020-09-25 NOTE — TELEPHONE ENCOUNTER
----- Message from ARTURO Fonseca CNP sent at 9/24/2020  9:30 AM EDT -----  Patient is A1c continues to elevate now 8.9 from 8.3. Mild elevation of PSA also noted.   Appointment for SLAVA as well as diabetic medication changes

## 2020-10-01 ENCOUNTER — OFFICE VISIT (OUTPATIENT)
Dept: FAMILY MEDICINE CLINIC | Age: 65
End: 2020-10-01
Payer: COMMERCIAL

## 2020-10-01 VITALS
HEIGHT: 74 IN | TEMPERATURE: 96.7 F | WEIGHT: 182 LBS | HEART RATE: 72 BPM | DIASTOLIC BLOOD PRESSURE: 70 MMHG | BODY MASS INDEX: 23.36 KG/M2 | SYSTOLIC BLOOD PRESSURE: 132 MMHG

## 2020-10-01 LAB
BILIRUBIN, POC: ABNORMAL
BLOOD URINE, POC: ABNORMAL
CLARITY, POC: CLEAR
COLOR, POC: YELLOW
GLUCOSE URINE, POC: 500
KETONES, POC: ABNORMAL
LEUKOCYTE EST, POC: ABNORMAL
NITRITE, POC: ABNORMAL
PH, POC: 7
PROTEIN, POC: ABNORMAL
SPECIFIC GRAVITY, POC: 1.02
UROBILINOGEN, POC: 1

## 2020-10-01 PROCEDURE — 3052F HG A1C>EQUAL 8.0%<EQUAL 9.0%: CPT | Performed by: NURSE PRACTITIONER

## 2020-10-01 PROCEDURE — 99214 OFFICE O/P EST MOD 30 MIN: CPT | Performed by: NURSE PRACTITIONER

## 2020-10-01 PROCEDURE — 90471 IMMUNIZATION ADMIN: CPT | Performed by: NURSE PRACTITIONER

## 2020-10-01 PROCEDURE — 81003 URINALYSIS AUTO W/O SCOPE: CPT | Performed by: NURSE PRACTITIONER

## 2020-10-01 PROCEDURE — 90686 IIV4 VACC NO PRSV 0.5 ML IM: CPT | Performed by: NURSE PRACTITIONER

## 2020-10-01 RX ORDER — EMPAGLIFLOZIN 25 MG/1
1 TABLET, FILM COATED ORAL DAILY
Qty: 30 TABLET | Refills: 5 | Status: SHIPPED | OUTPATIENT
Start: 2020-10-01 | End: 2021-03-29

## 2020-10-01 NOTE — PROGRESS NOTES
[address] [address]  Dept: [phone]  Dept Fax: [phone]  Loc: [phone]  PROGRESS NOTE      VisitDate: 10/1/2020    Castro Hopson is a 63 y.o. male who presents today for:     Chief Complaint   Patient presents with    Results     Go over PSA, needs SLAVA    Discuss Medications     Discuss diabetic medications    Injections     Flu shot         Subjective:  Patient presents to review abnormal labs. Elevated PSA had elevated A1c. Patient's patient reports taking medications as prescribed. Patient does not follow a diabetic diet. Declines basal insulin therapy. History of BPH, depression, type 2 diabetes, factor V, hypertension, osteoarthritis, sleep apnea, CVA affecting right visual field. . Mood stable. Patient presents accompanied with spouse concerned about weight loss approximately 15 pounds in the past year. Patient does complain of some mild constipation since starting Ozempic several months ago. Denies fever, illness, dysuria, nocturia, weak urine stream, dizziness, syncope, chest pain, shortness of breath, abdominal pain, rash or edema. Review of Systems   All other systems reviewed and are negative.     Past Medical History:   Diagnosis Date    BPH (benign prostatic hyperplasia)     Depression     Diabetes mellitus ([address]     Factor 5 Leiden mutation, heterozygous ([address]     Heterozygous MTHFR mutation [id number] ([address]     Hyperglycemia     Hypertension     Hypertension     Osteoarthritis     Sleep apnea     Stroke ([address] 8/8/2013    Type II or unspecified type diabetes mellitus without mention of complication, not stated as uncontrolled 8/2012    Visual field cut 8/8/2013    Right visual field cut      Past Surgical History:   Procedure Laterality Date    ABDOMEN SURGERY      APPENDECTOMY      CERVICAL FUSION  08    COLONOS[facility]  6/8/12    [facility]    TONSILLECTOMY      UMBILICAL HERNIA REPAIR  2012  Dr Kiki Singletary     Family History   Problem Relation Age of Onset    Diabetes Father     Diabetes Brother     Stroke Brother      Social History     Tobacco Use    Smoking status: Former Smoker     Packs/day: 0.50     Years: 40.00     Pack years: 20.00     Types: E-Cigarettes     Last attempt to quit: 2013     Years since quittin.1    Smokeless tobacco: Never Used    Tobacco comment: States he uses Vap   Substance Use Topics    Alcohol use:  Yes     Alcohol/week: 0.0 standard drinks     Comment: social      Current Outpatient Medications   Medication Sig Dispense Refill    empagliflozin (JARDIANCE) 25 MG tablet Take 1 tablet by mouth daily 30 tablet 5    aspirin 325 MG EC tablet take 1 tablet by mouth once daily 90 tablet 3    Blood Glucose Monitoring Suppl (ACCU-CHEK GUIDE) w/Device KIT CHECK BLOOD SUGAR DAILY      VORTIoxetine (TRINTELLIX) 10 MG TABS tablet take 1 tablet by mouth daily 90 tablet 3    triamcinolone (NASACORT ALLERGY 24HR) 55 MCG/ACT nasal inhaler 2 sprays by Each Nostril route daily 1 Inhaler 2    amLODIPine (NORVASC) 10 MG tablet Take 1 tablet by mouth daily 90 tablet 3    benazepril (LOTENSIN) 40 MG tablet Take 1 tablet by mouth daily 90 tablet 3    SITagliptin (JANUVIA) 100 MG tablet Take 1 tablet by mouth daily 90 tablet 3    Semaglutide, 1 MG/DOSE, 2 MG/1.5ML SOPN Inject 1 mg into the skin once a week 4 pen 11    TRUEplus Lancets 33G MISC USE TWICE A  each 3    blood glucose test strips (ACCU-CHEK SMARTVIEW) strip CHECK BLOOD SUGAR TWICE A DAY AS NEEDED FOR SYMPTOMS OF IRREGULAR BLOOD GLUCOSE 100 strip 3    Apple Sudhir Vn-Grn Tea-Bit Or-Cr (APPLE CIDER VINEGAR PLUS) TABS Take by mouth      ACCU-CHEK SMARTVIEW strip TEST BLOOD SUGAR ONCE A DAY 50 strip 11    Cholecalciferol (VITAMIN D3) 2000 units CAPS Take by mouth      latanoprost (XALATAN) 0.005 % ophthalmic solution Place 1 drop into both eyes daily       Esomeprazole Magnesium (NEXIUM 24HR PO) Take by mouth      OLIVE LEAF PO Take by mouth      NONFORMULARY daily PROSTATE SUPPORT      multivitamin (THERAGRAN) per tablet Take 1 tablet by mouth daily.  Lancets MISC Use qd  Dx:  250.02 50 each 11    cetirizine (ZYRTEC) 10 MG tablet Take 10 mg by mouth daily. No current facility-administered medications for this visit. No Known Allergies  Health Maintenance   Topic Date Due    Statin Therapy  1955    HIV screen  05/21/1972    DTaP/Tdap/Td vaccine (1 - Tdap) 05/21/1976    Shingles Vaccine (1 of 2) 05/21/2007    Diabetic microalbuminuria test  02/02/2019    Pneumococcal 0-64 years Vaccine (1 of 1 - PPSV23) 10/11/2019    Diabetic foot exam  09/20/2020    Diabetic retinal exam  02/21/2021    A1C test (Diabetic or Prediabetic)  09/18/2021    Lipid screen  09/18/2021    Potassium monitoring  09/18/2021    Creatinine monitoring  09/18/2021    Colon cancer screen colonoscopy  09/08/2027    Flu vaccine  Completed    Hepatitis C screen  Completed    Hepatitis A vaccine  Aged Out    Hib vaccine  Aged Out    Meningococcal (ACWY) vaccine  Aged Out         Objective:     Physical Exam  Vitals signs and nursing note reviewed. Constitutional:       Appearance: Normal appearance. He is well-developed and normal weight. He is not diaphoretic. HENT:      Head: Normocephalic and atraumatic. Not macrocephalic and not microcephalic. Right Ear: Hearing, tympanic membrane, ear canal and external ear normal. No drainage or tenderness. No middle ear effusion. No hemotympanum. Tympanic membrane is not injected, scarred, perforated or bulging. Left Ear: Hearing, tympanic membrane, ear canal and external ear normal. No drainage or tenderness. No middle ear effusion. No hemotympanum. Tympanic membrane is not injected, scarred, perforated or bulging. Nose: No nasal deformity, septal deviation, mucosal edema or rhinorrhea.       Right Sinus: No maxillary sinus tenderness or frontal sinus tenderness. Left Sinus: No maxillary sinus tenderness or frontal sinus tenderness. Mouth/Throat:      Mouth: No oral lesions. Dentition: Normal dentition. Does not have dentures. No dental caries or dental abscesses. Pharynx: Oropharynx is clear. No oropharyngeal exudate or posterior oropharyngeal erythema. Tonsils: No tonsillar abscesses. Eyes:      General: Lids are normal. No scleral icterus. Extraocular Movements:      Right eye: Normal extraocular motion. Left eye: Normal extraocular motion. Conjunctiva/sclera: Conjunctivae normal.      Right eye: Right conjunctiva is not injected. Left eye: Left conjunctiva is not injected. Neck:      Musculoskeletal: Normal range of motion and neck supple. Normal range of motion. No edema or erythema. Thyroid: No thyroid mass or thyromegaly. Vascular: No carotid bruit or JVD. Trachea: Trachea normal.   Cardiovascular:      Rate and Rhythm: Normal rate and regular rhythm. Pulses: Normal pulses. Heart sounds: Normal heart sounds, S1 normal and S2 normal. No murmur. No friction rub. No gallop. Pulmonary:      Effort: Pulmonary effort is normal. No respiratory distress. Breath sounds: Normal breath sounds. No wheezing, rhonchi or rales. Chest:      Chest wall: No tenderness. Abdominal:      General: Bowel sounds are normal.      Palpations: Abdomen is soft. There is no hepatomegaly, splenomegaly or mass. Tenderness: There is no guarding or rebound. Hernia: No hernia is present. There is no hernia in the ventral area or left inguinal area. Genitourinary:     Prostate: Enlarged. Not tender and no nodules present. Rectum: Normal. Guaiac result negative. No external hemorrhoid. Comments: Mild enlargement noted to the prostate. Palpable medial sulcus but very subtle. No nodules palpated. Musculoskeletal: Normal range of motion. 10/01/2020  8:39 AM  Unknown    1.0    Leukocytes, UA  10/01/2020  8:39 AM  Unknown    neg    Nitrite, UA  10/01/2020  8:39 AM  Unknown    neg    Lab and Collection     POCT Urinalysis No Micro (Auto) - 10/1/2020   Result Information     Flag: AbnormalAbnormal     Status: Final result (Collected: 10/1/2020 08:39)  Provider Status: Reviewed    All Reviewers List     [facility], ARTURO - CNP on 10/1/2020 09:52    Routing History     Priority  Sent On  From  To  Message Type      10/1/2020  8:40 AM  Panfilodio Sanders, CMA (AAMA)  [facility], APRN - CNP  Results    Click to Print Result    View SmartLink Info     POCT Urinalysis No Micro (Auto) (Order #[id number]) on 10/1/20    Order Report     Order Details     Impression/Plan:  1. DM type 2, goal HbA1c < 7% ([facility]    2. Essential hypertension    3. Increasing prostate specific antigen level    4. DM type 2, not at goal [facility])    5. Depression, unspecified depression type    6. Weight loss    7. Cloudy urine      Requested Prescriptions     Signed Prescriptions Disp Refills    empagliflozin (JARDIANCE) 25 MG tablet 30 tablet 5     Sig: Take 1 tablet by mouth daily     Orders Placed This Encounter   Procedures    INFLUENZA, QUADV, 3 YRS AND OLDER, IM PF, PREFILL SYR OR SDV, 0.5ML (AFLURIA QUADV, PF)    Hemoglobin A1C     Standing Status:   Future     Standing Expiration Date:   10/1/2021    Comprehensive Metabolic Panel     Standing Status:   Future     Standing Expiration Date:   10/1/2021    Psa screening     Standing Status:   Future     Standing Expiration Date:   10/1/2021    CBC Auto Differential     Standing Status:   Future     Standing Expiration Date:   10/1/2021    POCT Urinalysis No Micro (Auto)       Patient giveneducational materials - see patient instructions. Discussed use, benefit, and side effects of prescribed medications. All patient questions answered. Pt voiced understanding. Reviewed health maintenance. Patient agreedwith treatment plan. Follow up as directed. Repeat labs 3 months. Jardiance added. Continue other meds. Continue medications as prescribed. Educational information on above diagnosis  provided per AVS. diabetic diet guidelines provided.   Avoid simple sugars      Electronically signed by ARTURO Blancas CNP on 10/1/2020 at 10:52 AM

## 2020-10-01 NOTE — PROGRESS NOTES
Immunization(s) given during visit:    Immunizations Administered     Name Date Dose Route    Influenza, Quadv, IM, PF (6 mo and older Fluzone, Flulaval, Fluarix, and 3 yrs and older Afluria) 10/1/2020 0.5 mL Intramuscular    Site: Deltoid- Left    Lot: [id number]    NDC: 76980-103-67          Most recent Vaccine Information Sheet dated 2020 given to pt

## 2020-10-01 NOTE — PATIENT INSTRUCTIONS
Patient Education        Learning About Meal Planning for Diabetes  Why plan your meals? Meal planning can be a key part of managing diabetes. Planning meals and snacks with the right balance of carbohydrate, protein, and fat can help you keep your blood sugar at the target level you set with your doctor. You don't have to eat special foods. You can eat what your family eats, including sweets once in a while. But you do have to pay attention to how often you eat and how much you eat of certain foods. You may want to work with a dietitian or a certified diabetes educator. He or she can give you tips and meal ideas and can answer your questions about meal planning. This health professional can also help you reach a healthy weight if that is one of your goals. What plan is right for you? Your dietitian or diabetes educator may suggest that you start with the plate format or carbohydrate counting. The plate format  The plate format is a simple way to help you manage how you eat. You plan meals by learning how much space each food should take on a plate. Using the plate format helps you spread carbohydrate throughout the day. It can make it easier to keep your blood sugar level within your target range. It also helps you see if you're eating healthy portion sizes. To use the plate format, you put non-starchy vegetables on half your plate. Add meat or meat substitutes on one-quarter of the plate. Put a grain or starchy vegetable (such as brown rice or a potato) on the final quarter of the plate. You can add a small piece of fruit and some low-fat or fat-free milk or yogurt, depending on your carbohydrate goal for each meal.  Here are some tips for using the plate format:  · Make sure that you are not using an oversized plate. A 9-inch plate is best. Many restaurants use larger plates. · Get used to using the plate format at home. Then you can use it when you eat out.   · Write down your questions about using the plate format. Talk to your doctor, a dietitian, or a diabetes educator about your concerns. Carbohydrate counting  With carbohydrate counting, you plan meals based on the amount of carbohydrate in each food. Carbohydrate raises blood sugar higher and more quickly than any other nutrient. It is found in desserts, breads and cereals, and fruit. It's also found in starchy vegetables such as potatoes and corn, grains such as rice and pasta, and milk and yogurt. Spreading carbohydrate throughout the day helps keep your blood sugar levels within your target range. Your daily amount depends on several things, including your weight, how active you are, which diabetes medicines you take, and what your goals are for your blood sugar levels. A registered dietitian or diabetes educator can help you plan how much carbohydrate to include in each meal and snack. A guideline for your daily amount of carbohydrate is:  · 45 to 60 grams at each meal. That's about the same as 3 to 4 carbohydrate servings. · 15 to 20 grams at each snack. That's about the same as 1 carbohydrate serving. The Nutrition Facts label on packaged foods tells you how much carbohydrate is in a serving of the food. First, look at the serving size on the food label. Is that the amount you eat in a serving? All of the nutrition information on a food label is based on that serving size. So if you eat more or less than that, you'll need to adjust the other numbers. Total carbohydrate is the next thing you need to look for on the label. If you count carbohydrate servings, one serving of carbohydrate is 15 grams. For foods that don't come with labels, such as fresh fruits and vegetables, you'll need a guide that lists carbohydrate in these foods. Ask your doctor, dietitian, or diabetes educator about books or other nutrition guides you can use.   If you take insulin, you need to know how many grams of carbohydrate are in a meal. This lets you know how much rapid-acting insulin to take before you eat. If you use an insulin pump, you get a constant rate of insulin during the day. So the pump must be programmed at meals to give you extra insulin to cover the rise in blood sugar after meals. When you know how much carbohydrate you will eat, you can take the right amount of insulin. Or, if you always use the same amount of insulin, you need to make sure that you eat the same amount of carbohydrate at meals. If you need more help to understand carbohydrate counting and food labels, ask your doctor, dietitian, or diabetes educator. How do you get started with meal planning? Here are some tips to get started:  · Plan your meals a week at a time. Don't forget to include snacks too. · Use cookbooks or online recipes to plan several main meals. Plan some quick meals for busy nights. You also can double some recipes that freeze well. Then you can save half for other busy nights when you don't have time to cook. · Make sure you have the ingredients you need for your recipes. If you're running low on basic items, put these items on your shopping list too. · List foods that you use to make breakfasts, lunches, and snacks. List plenty of fruits and vegetables. · Post this list on the refrigerator. Add to it as you think of more things you need. · Take the list to the store to do your weekly shopping. Follow-up care is a key part of your treatment and safety. Be sure to make and go to all appointments, and call your doctor if you are having problems. It's also a good idea to know your test results and keep a list of the medicines you take. Where can you learn more? Go to https://philly.[employer]. org and sign in to your CGTrader account. Enter C476 in the [employer] box to learn more about \"Learning About Meal Planning for Diabetes. \"     If you do not have an account, please click on the \"Sign Up Now\" link.   Current as of: December 20, baked potato; ½ cup of cooked beans; or ½ cup cooked corn or green peas. · Learn how much carbs to eat each day and at each meal. A dietitian or CDE can teach you how to keep track of the amount of carbs you eat. This is called carbohydrate counting. · If you are not sure how to count carbohydrate grams, use the Plate Method to plan meals. It is a good, quick way to make sure that you have a balanced meal. It also helps you spread carbs throughout the day. ? Divide your plate by types of foods. Put non-starchy vegetables on half the plate, meat or other protein food on one-quarter of the plate, and a grain or starchy vegetable in the final quarter of the plate. To this you can add a small piece of fruit and 1 cup of milk or yogurt, depending on how many carbs you are supposed to eat at a meal.  · Try to eat about the same amount of carbs at each meal. Do not \"save up\" your daily allowance of carbs to eat at one meal.  · Proteins have very little or no carbs per serving. Examples of proteins are beef, chicken, turkey, fish, eggs, tofu, cheese, cottage cheese, and peanut butter. A serving size of meat is 3 ounces, which is about the size of a deck of cards. Examples of meat substitute serving sizes (equal to 1 ounce of meat) are 1/4 cup of cottage cheese, 1 egg, 1 tablespoon of peanut butter, and ½ cup of tofu. How can you eat out and still eat healthy? · Learn to estimate the serving sizes of foods that have carbohydrate. If you measure food at home, it will be easier to estimate the amount in a serving of restaurant food. · If the meal you order has too much carbohydrate (such as potatoes, corn, or baked beans), ask to have a low-carbohydrate food instead. Ask for a salad or green vegetables. · If you use insulin, check your blood sugar before and after eating out to help you plan how much to eat in the future. · If you eat more carbohydrate at a meal than you had planned, take a walk or do other exercise. This will help lower your blood sugar. What else should you know? · Limit saturated fat, such as the fat from meat and dairy products. This is a healthy choice because people who have diabetes are at higher risk of heart disease. So choose lean cuts of meat and nonfat or low-fat dairy products. Use olive or canola oil instead of butter or shortening when cooking. · Don't skip meals. Your blood sugar may drop too low if you skip meals and take insulin or certain medicines for diabetes. · Check with your doctor before you drink alcohol. Alcohol can cause your blood sugar to drop too low. Alcohol can also cause a bad reaction if you take certain diabetes medicines. Follow-up care is a key part of your treatment and safety. Be sure to make and go to all appointments, and call your doctor if you are having problems. It's also a good idea to know your test results and keep a list of the medicines you take. Where can you learn more? Go to https://"[email or web address]Mobcart. org and sign in to your Octavian account. Enter X202 in the [employer]ie box to learn more about \"Learning About Diabetes Food Guidelines. \"     If you do not have an account, please click on the \"Sign Up Now\" link. Current as of: December 20, 2019               Content Version: 12.5  © 4180-5561 Healthwise, Incorporated. Care instructions adapted under license by [facility] ([facility]). If you have questions about a medical condition or this instruction, always ask your healthcare professional. [place] [address] any warranty or liability for your use of this information. Patient Education        Noninsulin Medicines for Type 2 Diabetes: Care Instructions  Overview     There are different types of noninsulin medicines for diabetes. Each works in a different way. But they all help you control your blood sugar. Some types help your body make insulin to lower your blood sugar.  Others lower how much insulin your body your test results and keep a list of the medicines you take. How can you care for yourself at home? · Eat a healthy diet. Get some exercise each day. This may help you to reduce how much medicine you need. · Do not take other prescription or over-the-counter medicines, vitamins, herbal products, or supplements without talking to your doctor first. Some medicines for type 2 diabetes can cause problems with other medicines or supplements. · Tell your doctor if you plan to get pregnant. Some of these drugs are not safe for pregnant women. · Be safe with medicines. Take your medicines exactly as prescribed. Meglitinides and sulfonylureas can cause your blood sugar to drop very low. Call your doctor if you think you are having a problem with your medicine. · Check your blood sugar often. You can use a glucose monitor. Keeping track can help you know how certain foods, activities, and medicines affect your blood sugar. And it can help you keep your blood sugar from getting so low that it's not safe. When should you call for help? [id number] anytime you think you may need emergency care. For example, call if:  · You passed out (lost consciousness). · You are confused or cannot think clearly. · Your blood sugar is very high or very low. Watch closely for changes in your health, and be sure to contact your doctor if:  · Your blood sugar stays outside the level your doctor set for you. · You have any problems. Where can you learn more? Go to https://BizopesharonaVOZ.Ikonopedia. org and sign in to your WyzeTalk account. Enter H153 in the [facility] box to learn more about \"Noninsulin Medicines for Type 2 Diabetes: Care Instructions. \"     If you do not have an account, please click on the \"Sign Up Now\" link. Current as of: December 20, 2019               Content Version: 12.5  © 4997-9897 Healthwise, Incorporated. Care instructions adapted under license by [address]  If you have questions about a medical condition or this instruction, always ask your healthcare professional. [address] any warranty or liability for your use of this information. Patient Education        Learning About Low-Carbohydrate Foods  What foods are low in carbohydrate? The foods you eat contain nutrients, such as vitamins and minerals. Carbohydrate is a nutrient. Your body needs the right amount to stay healthy and work as it should. You can use the list below to help you make choices about which foods to eat. Some foods that are lower in carbohydrate include:  Dairy and dairy alternatives  · Cheese  · Cottage cheese  · Cream cheese  · Nut milk (unsweetened)  · Soy milk (unsweetened)  · Yogurt (Greek, plain)  Fruits  · Avocado  · [employer] and other protein foods  · Almonds  · Beef  · Chicken  · Cod  · Eggs  · Halibut  · Peanut butter and other nut butters  · Pistachios  · Pork  · Pumpkin seeds  · Tofu  · Trout  · [facility]  · [place]  · Walnuts  Vegetables  · Broccoli  · Carrots  · Cauliflower  · Green beans  · Mushrooms  · Peppers  · Salad greens  · Spinach  · Tomatoes  Work with your doctor to find out how much of this nutrient you need. Depending on your health, you may need more or less of it in your diet. Where can you learn more? Go to https://SmartAssetpepicMedaphis [facility] Corporationeb.[facility]. org and sign in to your [employer] account. Enter [phone] in the [employer] box to learn more about \"Learning About Low-Carbohydrate Foods. \"     If you do not have an account, please click on the \"Sign Up Now\" link. Current as of: August 22, 2019               Content Version: 12.5  © 0793-6749 Healthwise, Incorporated. Care instructions adapted under license by [facility] ([facility]). If you have questions about a medical condition or this instruction, always ask your healthcare professional. [address] any warranty or liability for your use of this information.          Patient Education Learning About Low-Carbohydrate Diets  What is a low-carbohydrate diet? A low-carbohydrate (or \"low-carb\") diet limits foods and drinks that have carbohydrates. This includes grains, fruits, milk and yogurt, and starchy vegetables like potatoes, beans, and corn. It also avoids foods and drinks that have added sugar. Instead, low-carb diets include foods that are high in protein and fat. Why might you follow a low-carb diet? Low-carb diets may be used for a variety of reasons, such as for weight loss. People who have diabetes may use a low-carb diet to help manage their blood sugar levels. What should you do before you start the diet? Talk to your doctor before you try any diet. This is even more important if you have health problems like kidney disease, heart disease, or diabetes. Your doctor may suggest that you meet with a registered dietitian. A dietitian can help you make an eating plan that works for you. What foods do you eat on a low-carb diet? On a low-carb diet, you choose foods that are high in protein and fat. Examples of these are:  · Meat, poultry, and fish. · Eggs. · Nuts, such as walnuts, pecans, almonds, and peanuts. · Peanut butter and other nut butters. · Tofu. · Avocado. · [place]. · Non-starchy vegetables like broccoli, cauliflower, green beans, mushrooms, peppers, lettuce, and spinach. · Unsweetened non-dairy milks like almond milk and coconut milk. · Cheese, cottage cheese, and cream cheese. Current as of: August 22, 2019               Content Version: 12.5  © 2649-5009 Healthwise, Incorporated. Care instructions adapted under license by [facility] ([facility]). If you have questions about a medical condition or this instruction, always ask your healthcare professional. [address] any warranty or liability for your use of this information.

## 2020-10-05 RX ORDER — SITAGLIPTIN 100 MG/1
TABLET, FILM COATED ORAL
Qty: 90 TABLET | Refills: 3 | Status: SHIPPED | OUTPATIENT
Start: 2020-10-05 | End: 2021-02-05

## 2020-10-09 ENCOUNTER — TELEPHONE (OUTPATIENT)
Dept: PULMONOLOGY | Age: 65
End: 2020-10-09

## 2020-10-09 NOTE — TELEPHONE ENCOUNTER
Pt would like to keep this day for his appt but do VV. Would it be okay to keep him as VV but move him to the karen schedule? Please advise.

## 2020-10-30 ENCOUNTER — VIRTUAL VISIT (OUTPATIENT)
Dept: PULMONOLOGY | Age: 65
End: 2020-10-30
Payer: COMMERCIAL

## 2020-10-30 PROCEDURE — 99213 OFFICE O/P EST LOW 20 MIN: CPT | Performed by: PHYSICIAN ASSISTANT

## 2020-10-30 ASSESSMENT — ENCOUNTER SYMPTOMS
BACK PAIN: 0
COUGH: 0
SHORTNESS OF BREATH: 0
DIARRHEA: 0
EYES NEGATIVE: 1
ALLERGIC/IMMUNOLOGIC NEGATIVE: 1
WHEEZING: 0
CHEST TIGHTNESS: 0
STRIDOR: 0
NAUSEA: 0

## 2020-10-30 NOTE — PROGRESS NOTES
[facility]      Miguel Funez         [id number]  10/30/2020   Chief Complaint   Patient presents with    Follow-up     CSA          PAP Download:   Original or initial  AHI: 35.4     Date of initial study: 11/14/10     Compliant  27%     Noncompliant 77 %     PAP Type ASV Level  6/15/0/18   Avg Hrs/Day 6hr 46min  AHI: 1.0     Machine/Mfg:   [x] ResMed    [] Respironics/Dreamstation   Interface:   [] Nasal    [] Nasal pillows   [] FFM      Provider:      [] [facility]     [x][facility]     [] [facility]    [] [facility]    [] [facility]               [] [facility]      [] [facility]    [] [address]      [] Other    Here is a scan of the most recent download:          [place] Sleepiness Score: 4    Presentation:   Farhat Whittaker presents for sleep medicine follow up for central sleep apnea  Since the last visit, Bruce received a new PAP. Here for follow up. He states that he wears PAP every night. He can not sleep without it. Download does not show usage consistent with this. Mask is fitting well. Equipment issues: The pressure is  acceptable, the mask is acceptable     Sleep issues:  Do you feel better? Yes  More rested? Yes   Better concentration? yes    Progress History:   Since last visit any new medical issues? No  New ER or hospitlal visits? No  Any new or changes in medicines? No  Any new sleep medicines?  No        Past Medical History:   Diagnosis Date    BPH (benign prostatic hyperplasia)     Depression     [facility]     Factor 5 Leiden mutation, heterozygous ([facility]     Heterozygous MTHFR mutation [id number] ([facility]     Hyperglycemia     Hypertension     Hypertension     Osteoarthritis     Sleep apnea     Stroke ([facility] 8/8/2013    Type II or unspecified type diabetes mellitus without mention of complication, not stated as uncontrolled 8/2012    Visual field cut 8/8/2013    Right visual field cut       Past Surgical History:   Procedure Laterality Date    ABDOMEN SURGERY      APPENDECTOMY      CERVICAL FUSION  08    COLONOSCOPY  12    [facility]    TONSILLECTOMY      UMBILICAL HERNIA REPAIR  2012  Dr Jennifer Martin       Social History     Tobacco Use    Smoking status: Former Smoker     Packs/day: 0.50     Years: 40.00     Pack years: 20.00     Types: E-Cigarettes     Last attempt to quit: 2013     Years since quittin.2    Smokeless tobacco: Never Used    Tobacco comment: States he uses Vap   Substance Use Topics    Alcohol use:  Yes     Alcohol/week: 0.0 standard drinks     Comment: social    Drug use: No       No Known Allergies    Current Outpatient Medications   Medication Sig Dispense Refill    JANUVIA 100 MG tablet TAKE 1 TABLET BY MOUTH DAILY 90 tablet 3    empagliflozin (JARDIANCE) 25 MG tablet Take 1 tablet by mouth daily 30 tablet 5    aspirin 325 MG EC tablet take 1 tablet by mouth once daily 90 tablet 3    Blood Glucose Monitoring Suppl (ACCU-CHEK GUIDE) w/Device KIT CHECK BLOOD SUGAR DAILY      VORTIoxetine (TRINTELLIX) 10 MG TABS tablet take 1 tablet by mouth daily 90 tablet 3    triamcinolone (NASACORT ALLERGY 24HR) 55 MCG/ACT nasal inhaler 2 sprays by Each Nostril route daily 1 Inhaler 2    amLODIPine (NORVASC) 10 MG tablet Take 1 tablet by mouth daily 90 tablet 3    benazepril (LOTENSIN) 40 MG tablet Take 1 tablet by mouth daily 90 tablet 3    Semaglutide, 1 MG/DOSE, 2 MG/1.5ML SOPN Inject 1 mg into the skin once a week 4 pen 11    TRUEplus Lancets 33G MISC USE TWICE A  each 3    blood glucose test strips (ACCU-CHEK SMARTVIEW) strip CHECK BLOOD SUGAR TWICE A DAY AS NEEDED FOR SYMPTOMS OF IRREGULAR BLOOD GLUCOSE 100 strip 3    Apple Sudhir Vn-Grn Tea-Bit Or-Cr (APPLE CIDER VINEGAR PLUS) TABS Take by mouth      ACCU-CHEK SMARTVIEW strip TEST BLOOD SUGAR ONCE A DAY 50 strip 11    Cholecalciferol (VITAMIN D3) 2000 units CAPS Take by mouth      latanoprost (XALATAN) 0.005 % ophthalmic solution Place 1 drop into both eyes daily       Esomeprazole Magnesium (NEXIUM 24HR PO) Take by mouth      OLIVE LEAF PO Take by mouth      NONFORMULARY daily PROSTATE SUPPORT      multivitamin (THERAGRAN) per tablet Take 1 tablet by mouth daily.  Lancets MISC Use qd  Dx:  250.02 50 each 11    cetirizine (ZYRTEC) 10 MG tablet Take 10 mg by mouth daily. No current facility-administered medications for this visit. Family History   Problem Relation Age of Onset    Diabetes Father     Diabetes Brother     Stroke Brother         Review of Systems -   Review of Systems   Constitutional: Negative for activity change, appetite change, chills and fever. HENT: Negative for congestion and postnasal drip. Eyes: Negative. Respiratory: Negative for cough, chest tightness, shortness of breath, wheezing and stridor. Cardiovascular: Negative for chest pain and leg swelling. Gastrointestinal: Negative for diarrhea and nausea. Endocrine: Negative. Genitourinary: Negative. Musculoskeletal: Negative. Negative for arthralgias and back pain. Skin: Negative. Allergic/Immunologic: Negative. Neurological: Negative. Negative for dizziness and light-headedness. Psychiatric/Behavioral: Negative. All other systems reviewed and are negative. Physical Exam:    BMI:  There is no height or weight on file to calculate BMI. Wt Readings from Last 3 Encounters:   10/01/20 182 lb (82.6 kg)   08/17/20 185 lb (83.9 kg)   02/28/20 198 lb 6.4 oz (90 kg)     Weight stable / unchanged  Vitals: There were no vitals taken for this visit. Physical Exam  Constitutional:       Appearance: Normal appearance. He is normal weight. HENT:      Head: Normocephalic and atraumatic. Right Ear: External ear normal.      Left Ear: External ear normal.      Nose: Nose normal.   Eyes:      Extraocular Movements: Extraocular movements intact.       Conjunctiva/sclera: Conjunctivae normal.      Pupils: Pupils are equal, round, and reactive to light. Neck:      Musculoskeletal: Normal range of motion and neck supple. Pulmonary:      Effort: Pulmonary effort is normal.   Neurological:      General: No focal deficit present. Mental Status: He is alert and oriented to person, place, and time. Psychiatric:         Attention and Perception: Attention and perception normal.         Mood and Affect: Mood and affect normal.         Speech: Speech normal.         Behavior: Behavior normal. Behavior is cooperative. Thought Content: Thought content normal.         Cognition and Memory: Cognition normal.         Judgment: Judgment normal.           ASSESSMENT/DIAGNOSIS     Diagnosis Orders   1. Central sleep apnea due to Cheyne-Spence respiration              Plan   Do you need any equipment today? No  - Need to have [facility] evaluate PAP to see if there is a connection issue with the cloud  - Will get updated download once issue has been resolved  - He  was advised to continue current positive airway pressure therapy with above described pressure. - He  advised to keep good compliance with current recommended pressure to get optimal results and clinical improvement  - Recommend 7-9 hours of sleep with PAP  - He was advised to call [facility] [employer] [employer] regarding supplies if needed.   -He call my office for earlier appointment if needed for worsening of sleep symptoms.   - He was instructed on weight loss  - Bruce was educated about my impression and plan. Patient verbalizesunderstanding. We will see Bruce Cooper back in: 1 year with download    Information added by my medical assistant/LPN was reviewed today        OLMAN Yip PA-C  10/30/2020        Bruce is being evaluated by a Virtual Visit (video visit) encounter to address concerns as mentioned above. A caregiver was present when appropriate.  Due to this being a TeleHealth encounter (During [facility]-25 public health emergency), evaluation of the following organ systems was limited: Vitals/Constitutional/EENT/Resp/CV/GI//MS/Neuro/Skin/Heme-Lymph-Imm. Pursuant to the emergency declaration under the [address] [place] [address] and the [employer] Act, this Virtual Visit was conducted with patient's (and/or legal guardian's) consent, to reduce the patient's risk of exposure to COVID-19 and provide necessary medical care. The patient (and/or legal guardian) has also been advised to contact this office for worsening conditions or problems, and seek emergency medical treatment and/or call 911 if deemed necessary. Services were provided through a video synchronous discussion virtually to substitute for in-person clinic visit. Patient and provider were located at their individual homes. Patient identification was verified at the start of the visit. Total time spent on this encounter was 15 min     Yolanda Baca PA-C, MPAS  10/30/2020      An electronic signature was used to authenticate this note.

## 2020-11-16 RX ORDER — BENAZEPRIL HYDROCHLORIDE 40 MG/1
40 TABLET, FILM COATED ORAL DAILY
Qty: 90 TABLET | Refills: 3 | Status: SHIPPED | OUTPATIENT
Start: 2020-11-16 | End: 2021-11-02

## 2020-11-16 RX ORDER — AMLODIPINE BESYLATE 10 MG/1
10 TABLET ORAL DAILY
Qty: 90 TABLET | Refills: 3 | Status: SHIPPED | OUTPATIENT
Start: 2020-11-16 | End: 2021-11-02

## 2021-01-04 ENCOUNTER — TELEPHONE (OUTPATIENT)
Dept: FAMILY MEDICINE CLINIC | Age: 66
End: 2021-01-04

## 2021-01-04 NOTE — TELEPHONE ENCOUNTER
E[facility] received a call from:    Name of Caller: Bruce     Relationship to patient:Self    Best contact number: [phone]    Reason for call: Pt called in and stated that he needs to her hisblood work done for his appt, he is going to reschedule for a later time but he will be able to get the blood work dont this Thursday he is requesting that the paper work be sent to [facility] [employer] on [address], please follow up

## 2021-01-09 LAB
ABSOLUTE BASO #: 0.1 X10E9/L (ref 0–0.2)
ABSOLUTE EOS #: 0.3 X10E9/L (ref 0–0.4)
ABSOLUTE LYMPH #: 2 X10E9/L (ref 1–3.5)
ABSOLUTE MONO #: 0.5 X10E9/L (ref 0–0.9)
ABSOLUTE NEUT #: 3.4 X10E9/L (ref 1.5–6.6)
ALBUMIN SERPL-MCNC: 4.4 G/DL (ref 3.2–5.3)
ALK PHOSPHATASE: 84 U/L (ref 39–130)
ALT SERPL-CCNC: 24 U/L (ref 0–40)
ANION GAP SERPL CALCULATED.3IONS-SCNC: 9 MMOL/L (ref 5–15)
AST SERPL-CCNC: 16 U/L (ref 0–41)
AVERAGE GLUCOSE: 146 MG/DL
BASOPHILS RELATIVE PERCENT: 1.2 %
BILIRUB SERPL-MCNC: 0.5 MG/DL (ref 0.3–1.2)
BUN BLDV-MCNC: 16 MG/DL (ref 5–27)
CALCIUM SERPL-MCNC: 9.3 MG/DL (ref 8.5–10.5)
CHLORIDE BLD-SCNC: 108 MMOL/L (ref 98–109)
CO2: 29 MMOL/L (ref 22–32)
CREAT SERPL-MCNC: 0.76 MG/DL (ref 0.6–1.3)
EGFR AFRICAN AMERICAN: >60 ML/MIN/1.73SQ.M
EGFR IF NONAFRICAN AMERICAN: >60 ML/MIN/1.73SQ.M
EOSINOPHILS RELATIVE PERCENT: 4.4 %
GLUCOSE: 133 MG/DL (ref 65–99)
HBA1C MFR BLD: 6.7 % (ref 4.4–6.4)
HCT VFR BLD CALC: 49.5 % (ref 39–49)
HEMOGLOBIN: 16.7 G/DL (ref 13–17)
LYMPHOCYTE %: 32.4 %
MCH RBC QN AUTO: 33 PG (ref 27–34)
MCHC RBC AUTO-ENTMCNC: 33.8 G/DL (ref 32–36)
MCV RBC AUTO: 98 FL (ref 80–100)
MONOCYTES # BLD: 8 %
NEUTROPHILS RELATIVE PERCENT: 54 %
PDW BLD-RTO: 12.5 % (ref 11.5–15)
PLATELETS: 266 X10E9/L (ref 150–450)
PMV BLD AUTO: 8 FL (ref 7–12)
POTASSIUM SERPL-SCNC: 3.9 MMOL/L (ref 3.5–5)
PSA, ULTRASENSITIVE: 2.72 NG/ML (ref 0–4)
RBC: 5.06 X10E12/L (ref 4.1–5.7)
SODIUM BLD-SCNC: 146 MMOL/L (ref 134–146)
TOTAL PROTEIN: 6.7 G/DL (ref 6–8)
WBC: 6.3 X10E9/L (ref 4–11)

## 2021-01-11 ENCOUNTER — TELEPHONE (OUTPATIENT)
Dept: FAMILY MEDICINE CLINIC | Age: 66
End: 2021-01-11

## 2021-01-11 DIAGNOSIS — R97.20 ELEVATED PSA: Primary | ICD-10-CM

## 2021-01-11 NOTE — TELEPHONE ENCOUNTER
----- Message from ARTURO Pabon CNP sent at 1/11/2021 10:58 AM EST -----  A A1c much improved to 6.7 from 8.9. Continue current meds. CBC within normal limits. PSA continues to elevate 2.7 from 1.06,1-year ago. Recommend urology consult due to elevated PSA.

## 2021-02-05 ENCOUNTER — OFFICE VISIT (OUTPATIENT)
Dept: FAMILY MEDICINE CLINIC | Age: 66
End: 2021-02-05
Payer: COMMERCIAL

## 2021-02-05 ENCOUNTER — TELEPHONE (OUTPATIENT)
Dept: CARDIOLOGY CLINIC | Age: 66
End: 2021-02-05

## 2021-02-05 VITALS
OXYGEN SATURATION: 98 % | TEMPERATURE: 97.2 F | SYSTOLIC BLOOD PRESSURE: 136 MMHG | RESPIRATION RATE: 16 BRPM | HEART RATE: 71 BPM | BODY MASS INDEX: 23.69 KG/M2 | DIASTOLIC BLOOD PRESSURE: 66 MMHG | WEIGHT: 182 LBS

## 2021-02-05 DIAGNOSIS — H65.01 RIGHT ACUTE SEROUS OTITIS MEDIA, RECURRENCE NOT SPECIFIED: ICD-10-CM

## 2021-02-05 DIAGNOSIS — F32.A DEPRESSION, UNSPECIFIED DEPRESSION TYPE: ICD-10-CM

## 2021-02-05 DIAGNOSIS — I10 ESSENTIAL HYPERTENSION: ICD-10-CM

## 2021-02-05 DIAGNOSIS — E11.9 DM TYPE 2, GOAL HBA1C < 7% (HCC): Primary | ICD-10-CM

## 2021-02-05 PROCEDURE — 99214 OFFICE O/P EST MOD 30 MIN: CPT | Performed by: NURSE PRACTITIONER

## 2021-02-05 RX ORDER — AZITHROMYCIN 250 MG/1
TABLET, FILM COATED ORAL
Qty: 6 TABLET | Refills: 0 | Status: SHIPPED | OUTPATIENT
Start: 2021-02-05 | End: 2021-02-15

## 2021-02-09 NOTE — PROGRESS NOTES
[address] [place] [facility] [address]  Dept: [phone]  Dept Fax: [phone]  Loc: [phone]  PROGRESS NOTE      VisitDate: 2/5/2021    Sudhir Unger is a 63 y.o. male who presents today for:     Chief Complaint   Patient presents with    3 Month Follow-Up     DM, OA, HTN,     Ear Fullness     right side fullness, pressure x3wks, denies pain    Discuss Medications     discuss long term effects of ozempic         Subjective:  Presents for 3-month follow-up regarding diabetes, hypertension. Patient here to review recent labs. Take medications as prescribed. Patient complains of right-sided ear fullness pressure for the past 3 weeks. Denies any dizziness, syncope, headaches, chest pain, shortness of breath, abdominal pain or edema. History of depression, type II, factor V, MTHFR, hypertension, osteoarthritis, CVA, sleep apnea. Patient was 1/2 pack smoker for 40 years. converted to [facility] 2013. Reports mood stable. Patient does report subtle intermittent nausea associated with Ozempic. Review of Systems   HENT: Positive for ear pain. Musculoskeletal: Positive for arthralgias. Psychiatric/Behavioral: Positive for decreased concentration and dysphoric mood. All other systems reviewed and are negative.     Past Medical History:   Diagnosis Date    BPH (benign prostatic hyperplasia)     Depression     Diabetes mellitus (HCC)     Factor 5 Leiden mutation, heterozygous ([address]     [facility]ozygous M[facility] mutation [id number] [facility]     Hyperglycemia     Hypertension     Hypertension     Osteoarthritis     Sleep apnea     Stroke ([facility] 8/8/2013    Type II or unspecified type diabetes mellitus without mention of complication, not stated as uncontrolled 8/2012    Visual field cut 8/8/2013    Right visual field cut      Past Surgical History:   Procedure Laterality Date    ABDOMEN SURGERY      APPENDECTOMY      CERVICAL FUSION  08    [facility]OSCOPY  12    [facility]    TONSILLECTOMY      UMBILICAL HERNIA REPAIR  2012  Dr Jade Manual     Family History   Problem Relation Age of Onset    Diabetes Father     Diabetes Brother     Stroke Brother      Social History     Tobacco Use    Smoking status: Former Smoker     Packs/day: 0.50     Years: 40.00     Pack years: 20.00     Types: E-Cigarettes     Quit date: 2013     Years since quittin.5    Smokeless tobacco: Never Used    Tobacco comment: States he uses Vap   Substance Use Topics    Alcohol use:  Yes     Alcohol/week: 0.0 standard drinks     Comment: social      Current Outpatient Medications   Medication Sig Dispense Refill    azithromycin (ZITHROMAX Z-RAMIRO) 250 MG tablet 2 pills orally for 1 day, then 1 pill orally for 4 days 6 tablet 0    neomycin-polymyxin-hydrocortisone (CORTISPORIN) 3.5-63777-5 otic solution Place 3 drops into the right ear 3 times daily for 7 days 1 each 0    amLODIPine (NORVASC) 10 MG tablet TAKE 1 TABLET BY MOUTH DAILY 90 tablet 3    benazepril (LOTENSIN) 40 MG tablet TAKE 1 TABLET BY MOUTH DAILY 90 tablet 3    empagliflozin (JARDIANCE) 25 MG tablet Take 1 tablet by mouth daily 30 tablet 5    aspirin 325 MG EC tablet take 1 tablet by mouth once daily 90 tablet 3    Blood Glucose Monitoring Suppl (ACCU-CHEK GUIDE) w/Device KIT CHECK BLOOD SUGAR DAILY      VORTIoxetine (TRINTELLIX) 10 MG TABS tablet take 1 tablet by mouth daily 90 tablet 3    triamcinolone (NASACORT ALLERGY 24HR) 55 MCG/ACT nasal inhaler 2 sprays by Each Nostril route daily 1 Inhaler 2    Semaglutide, 1 MG/DOSE, 2 MG/1.5ML SOPN Inject 1 mg into the skin once a week 4 pen 11    TRUEplus Lancets 33G MISC USE TWICE A  each 3    blood glucose test strips (ACCU-CHEK SMARTVIEW) strip CHECK BLOOD SUGAR TWICE A DAY AS NEEDED FOR SYMPTOMS OF IRREGULAR BLOOD GLUCOSE 100 strip 3    Apple Sudhir Vn-Grn Tea-Bit Or-Cr (APPLE CIDER VINEGAR PLUS) TABS Take by mouth  ACCU-CHEK SMARTVIEW strip TEST BLOOD SUGAR ONCE A DAY 50 strip 11    Cholecalciferol (VITAMIN D3) 2000 units CAPS Take by mouth      latanoprost (XALATAN) 0.005 % ophthalmic solution Place 1 drop into both eyes daily       Esomeprazole Magnesium (NEXIUM 24HR PO) Take by mouth      OLIVE LEAF PO Take by mouth      NONFORMULARY daily PROSTATE SUPPORT      multivitamin (THERAGRAN) per tablet Take 1 tablet by mouth daily.  Lancets MISC Use qd  Dx:  250.02 50 each 11    cetirizine (ZYRTEC) 10 MG tablet Take 10 mg by mouth daily. No current facility-administered medications for this visit. No Known Allergies  Health Maintenance   Topic Date Due    HIV screen  05/21/1972    DTaP/Tdap/Td vaccine (1 - Tdap) 05/21/1976    Shingles Vaccine (1 of 2) 05/21/2007    Diabetic microalbuminuria test  02/02/2019    Pneumococcal 0-64 years Vaccine (1 of 1 - PPSV23) 10/11/2019    Diabetic retinal exam  02/21/2021    Lipid screen  09/18/2021    Diabetic foot exam  10/01/2021    A1C test (Diabetic or Prediabetic)  01/08/2022    Potassium monitoring  01/08/2022    Creatinine monitoring  01/08/2022    Colon cancer screen colonoscopy  09/08/2027    Flu vaccine  Completed    Hepatitis C screen  Completed    Hepatitis A vaccine  Aged Out    Hib vaccine  Aged Out    Meningococcal (ACWY) vaccine  Aged Out         Objective:     Physical Exam  Vitals signs and nursing note reviewed. Constitutional:       Appearance: Normal appearance. He is well-developed and normal weight. He is not diaphoretic. HENT:      Head: Normocephalic and atraumatic. Not macrocephalic and not microcephalic. Right Ear: Hearing, ear canal and external ear normal. No drainage or tenderness. A middle ear effusion is present. No hemotympanum. Tympanic membrane is not injected, scarred, perforated or bulging. Left Ear: Hearing, tympanic membrane, ear canal and external ear normal. No drainage or tenderness.   No middle ear effusion. No hemotympanum. Tympanic membrane is not injected, scarred, perforated or bulging. Nose: Nose normal. No nasal deformity, septal deviation, mucosal edema or rhinorrhea. Right Sinus: No maxillary sinus tenderness or frontal sinus tenderness. Left Sinus: No maxillary sinus tenderness or frontal sinus tenderness. Mouth/Throat:      Mouth: No oral lesions. Dentition: Normal dentition. Does not have dentures. No dental caries or dental abscesses. Pharynx: No oropharyngeal exudate or posterior oropharyngeal erythema. Tonsils: No tonsillar abscesses. Eyes:      General: Lids are normal. No scleral icterus. Extraocular Movements:      Right eye: Normal extraocular motion. Left eye: Normal extraocular motion. Conjunctiva/sclera: Conjunctivae normal.      Right eye: Right conjunctiva is not injected. Left eye: Left conjunctiva is not injected. Neck:      Musculoskeletal: Normal range of motion and neck supple. Normal range of motion. No edema or erythema. Thyroid: No thyroid mass or thyromegaly. Vascular: No carotid bruit or JVD. Trachea: Trachea normal.   Cardiovascular:      Rate and Rhythm: Normal rate and regular rhythm. Heart sounds: Normal heart sounds, S1 normal and S2 normal. No murmur. No friction rub. No gallop. Pulmonary:      Effort: Pulmonary effort is normal. No respiratory distress. Breath sounds: Normal breath sounds. No wheezing, rhonchi or rales. Chest:      Chest wall: No tenderness. Abdominal:      General: Bowel sounds are normal.      Palpations: Abdomen is soft. There is no hepatomegaly, splenomegaly or mass. Tenderness: There is no guarding or rebound. Hernia: No hernia is present. There is no hernia in the ventral area or left inguinal area. Musculoskeletal: Normal range of motion. General: No tenderness.    Lymphadenopathy:      Head:      Right side of head: No submental, submandibular, tonsillar, preauricular, posterior auricular or occipital adenopathy. Left side of head: No submental, submandibular, tonsillar, preauricular, posterior auricular or occipital adenopathy. Cervical: No cervical adenopathy. Right cervical: No superficial, deep or posterior cervical adenopathy. Left cervical: No superficial, deep or posterior cervical adenopathy. Upper Body:      Right upper body: No supraclavicular or pectoral adenopathy. Left upper body: No supraclavicular or pectoral adenopathy. Skin:     General: Skin is warm and dry. Coloration: Skin is not pale. Findings: No bruising, ecchymosis, laceration, lesion or rash. Nails: There is no clubbing. Neurological:      General: No focal deficit present. Mental Status: He is alert and oriented to person, place, and time. Cranial Nerves: No cranial nerve deficit. Motor: No abnormal muscle tone. Coordination: Coordination normal.      Deep Tendon Reflexes: Reflexes normal.   Psychiatric:         Mood and Affect: Mood normal.         Speech: Speech normal.         Behavior: Behavior normal.         Thought Content:  Thought content normal.         Judgment: Judgment normal.       /66 (Site: Right Upper Arm)   Pulse 71   Temp 97.2 °F (36.2 °C) (Infrared)   Resp 16   Wt 182 lb (82.6 kg)   SpO2 98%   BMI 23.69 kg/m²     Component      Latest Ref Rng & Units 1/8/2021 9/18/2020   WBC      4.0 - 11.0 X10E9/L 6.3    RBC      4.10 - 5.70 X10E12/L 5.06    Hemoglobin Quant      13.0 - 17.0 g/dL 16.7    Hematocrit      39 - 49 % 49.5 (H)    MCV      80 - 100 fL 98    MCH      27 - 34 pg 33.0    MCHC      32 - 36 g/dL 33.8    RDW      11.5 - 15.0 % 12.5    Platelet Count      398 - 450 X10E9/L 266    MPV      7 - 12 fL 8.0    Neutrophils %      % 54.0    Absolute Neut #      1.5 - 6.6 X10E9/L 3.4    Lymphocyte %      % 32.4    Absolute Lymph #      1.0 - 3.5 X10E9/L 2.0 Monocytes      % 8.0    Absolute Mono #      0 - 0.9 X10E9/L 0.5    Eosinophils %      % 4.4    Absolute Eos #      0.0 - 0.4 X10E9/L 0.3    Basophils %      % 1.2    Basophils Absolute      0.0 - 0.2 X10E9/L 0.1    Glucose      65 - 99 mg/dL 133 (H) 177 (H)   BUN      5 - 27 mg/dL 16 9   Creatinine      0.60 - 1.30 mg/dL 0.76 0.74   eGFR African American      >59 ml/min/1.73sq.m >60 >60   EGFR IF NonAfrican American      >59 ml/min/1.73sq.m >60 >60   Calcium      8.5 - 10.5 mg/dL 9.3 9.2   Sodium      134 - 146 mmol/L 146 137   Potassium      3.5 - 5.0 mmol/L 3.9 4.0   Chloride      98 - 109 mmol/L 108 99   CO2      22 - 32 mmol/L 29 29   Anion Gap      5 - 15 mmol/L 9 9   Bilirubin      0.3 - 1.2 mg/dL 0.5 0.8   Alk Phosphatase      39 - 130 U/L 84 90   AST      0 - 41 U/L 16 23   ALT      0 - 40 U/L 24 35   Total Protein      6.0 - 8.0 g/dL 6.7 6.9   Albumin      3.2 - 5.3 g/dL 4.4 4.4   Cholesterol      150 - 200 mg/dL  187   Triglycerides      27 - 150 mg/dL  49   HDL Cholesterol      >39 mg/dL  47   LDL Calculated      <130 mg/dL  130 (H)   VLDL Cholesterol Calculated      0 - 30 mg/dL  10   LDl/HDL Ratio      <3.5  2.8   Chol/HDL Ratio      1.0 - 5.0  4.0   Hemoglobin A1C      4.4 - 6.4 % 6.7 (H) 8.9 (H)   AVERAGE GLUCOSE      mg/dL 146 209   PSA, Ultrasensitive      0.00 - 4.00 ng/mL 2.72 2.27   Labs reviewed with patient. Impression/Plan:  1. DM type 2, goal HbA1c < 7% ([facility])    2. Essential hypertension    3. Depression, unspecified depression type    4.  Right acute serous otitis media, recurrence not specified      Requested Prescriptions     Signed Prescriptions Disp Refills    azithromycin (ZITHROMAX Z-RAMIRO) 250 MG tablet 6 tablet 0     Si pills orally for 1 day, then 1 pill orally for 4 days    neomycin-polymyxin-hydrocortisone (CORTISPORIN) 3.5-27447-2 otic solution 1 each 0     Sig: Place 3 drops into the right ear 3 times daily for 7 days     Orders Placed This Encounter   Procedures   

## 2021-03-05 RX ORDER — FEXOFENADINE HCL 180 MG/1
180 TABLET ORAL DAILY
Qty: 30 TABLET | Refills: 0 | Status: SHIPPED | OUTPATIENT
Start: 2021-03-05 | End: 2021-04-04

## 2021-03-05 RX ORDER — CIPROFLOXACIN 500 MG/1
500 TABLET, FILM COATED ORAL 2 TIMES DAILY
Qty: 20 TABLET | Refills: 0 | Status: SHIPPED | OUTPATIENT
Start: 2021-03-05 | End: 2021-03-15

## 2021-03-22 ENCOUNTER — TELEPHONE (OUTPATIENT)
Dept: FAMILY MEDICINE CLINIC | Age: 66
End: 2021-03-22

## 2021-03-22 ENCOUNTER — OFFICE VISIT (OUTPATIENT)
Dept: FAMILY MEDICINE CLINIC | Age: 66
End: 2021-03-22
Payer: COMMERCIAL

## 2021-03-22 VITALS
OXYGEN SATURATION: 98 % | TEMPERATURE: 98.2 F | BODY MASS INDEX: 22.12 KG/M2 | DIASTOLIC BLOOD PRESSURE: 68 MMHG | WEIGHT: 170 LBS | HEART RATE: 78 BPM | RESPIRATION RATE: 16 BRPM | SYSTOLIC BLOOD PRESSURE: 138 MMHG

## 2021-03-22 DIAGNOSIS — J40 BRONCHITIS: Primary | ICD-10-CM

## 2021-03-22 PROCEDURE — 99213 OFFICE O/P EST LOW 20 MIN: CPT | Performed by: NURSE PRACTITIONER

## 2021-03-22 RX ORDER — CEFUROXIME AXETIL 250 MG/1
250 TABLET ORAL 2 TIMES DAILY
Qty: 20 TABLET | Refills: 0 | Status: SHIPPED | OUTPATIENT
Start: 2021-03-22 | End: 2021-04-01

## 2021-03-22 RX ORDER — GUAIFENESIN 600 MG/1
600 TABLET, EXTENDED RELEASE ORAL 2 TIMES DAILY
Qty: 30 TABLET | Refills: 0 | Status: SHIPPED | OUTPATIENT
Start: 2021-03-22 | End: 2021-04-06

## 2021-03-22 NOTE — TELEPHONE ENCOUNTER
To add to the previous telephone encounter the patient's wife states the patient went to [facility] to be tested for Covid (rapid testing) came back negative.

## 2021-03-22 NOTE — LETTER
[address]  [address] [address]  Phone: [phone]  Fax: [phone]    ARTURO Merritt CNP        March 22, 2021     Patient: Allison Pond   YOB: 1955   Date of Visit: 3/22/2021       To Whom it May Concern:    Last Au was seen in my clinic on 3/22/2021. Please excuse him from work 3/20/21-3/24/21 due to illness. If you have any questions or concerns, please don't hesitate to call.     Sincerely,         ARTURO Merritt CNP

## 2021-03-22 NOTE — TELEPHONE ENCOUNTER
ECC received a call from:    Name of Caller: Bruce    Relationship to patient:Self    Organization name: [facility]    Best contact number: [phone]    Reason for call: Patient calling to schedule an appointment for today. Patient states he is having congestion , coughing and fever. Patient declined a virtual appointment, wants to be seen in office.  Please call to advise

## 2021-03-24 ENCOUNTER — TELEPHONE (OUTPATIENT)
Dept: FAMILY MEDICINE CLINIC | Age: 66
End: 2021-03-24

## 2021-03-24 ASSESSMENT — ENCOUNTER SYMPTOMS
GASTROINTESTINAL NEGATIVE: 1
SINUS PAIN: 1
EYES NEGATIVE: 1
SINUS PRESSURE: 1
COUGH: 1

## 2021-03-24 NOTE — TELEPHONE ENCOUNTER
Was supposed to RTW 3-25-21 but he is still coughing and is a little hoarse. Asking if he can RTW 3-27-21?     email his note to [email or web address]

## 2021-03-24 NOTE — PROGRESS NOTES
[address] [facility] [address]  Dept: [phone]  Dept Fax: [phone]  Loc: [phone]  PROGRESS NOTE      VisitDate: 3/22/2021    Andreea Venegas is a 63 y.o. male who presents today for:     Chief Complaint   Patient presents with    URI     last week had a head cold with right side otalgia, Thurs, Friday-102 temp-gone in a few hours, covid test negative-RA, cough-sometimes productive, thin and clear mucous,          Subjective:  Patient presents for red appointment. Reports head congestion, right otalgia, cough since last week. Patient reports fever of 102 over the weekend which only lasted a couple hours. Reports negative Covid test per [facility]. Denies any shortness of breath chest pain, abdominal pain, loss of taste or smell, abdominal pain or edema. History of BPH, depression, type 2 diabetes, hypertension, osteoarthritis, CVA, factor V mutation, MTHFR mutation. Mood stable        Review of Systems   Constitutional: Positive for fatigue and fever. HENT: Positive for congestion, ear pain, sinus pressure and sinus pain. Eyes: Negative. Respiratory: Positive for cough. Cardiovascular: Negative. Gastrointestinal: Negative. Endocrine: Negative. Genitourinary: Negative. Musculoskeletal: Negative. Neurological: Negative. Hematological: Negative. Psychiatric/Behavioral: Positive for decreased concentration and dysphoric mood.      Past Medical History:   Diagnosis Date    BPH (benign prostatic hyperplasia)     Depression     Diabetes mellitus ([facility]     Factor 5 Leiden mutation, heterozygous ([facility]     Heterozygous MTHFR mutation [id number] ([facility]     Hyperglycemia     Hypertension     Hypertension     Osteoarthritis     Sleep apnea     Stroke ([facility] [facility] 8/8/2013    Type II or unspecified type diabetes mellitus without mention of complication, not stated as uncontrolled 8/2012    (ACCU-CHEK SMARTVIEW) strip CHECK BLOOD SUGAR TWICE A DAY AS NEEDED FOR SYMPTOMS OF IRREGULAR BLOOD GLUCOSE 100 strip 3    Apple Sudhir Vn-Grn Tea-Bit Or-Cr (APPLE CIDER VINEGAR PLUS) TABS Take by mouth      ACCU-CHEK SMARTVIEW strip TEST BLOOD SUGAR ONCE A DAY 50 strip 11    Cholecalciferol (VITAMIN D3) 2000 units CAPS Take by mouth      latanoprost (XALATAN) 0.005 % ophthalmic solution Place 1 drop into both eyes daily       Esomeprazole Magnesium (NEXIUM 24HR PO) Take by mouth      OLIVE LEAF PO Take by mouth      NONFORMULARY daily PROSTATE SUPPORT      multivitamin (THERAGRAN) per tablet Take 1 tablet by mouth daily.  Lancets MISC Use qd  Dx:  250.02 50 each 11    cetirizine (ZYRTEC) 10 MG tablet Take 10 mg by mouth daily. No current facility-administered medications for this visit. No Known Allergies  Health Maintenance   Topic Date Due    HIV screen  Never done    DTaP/Tdap/Td vaccine (1 - Tdap) Never done    Shingles Vaccine (1 of 2) Never done    Diabetic microalbuminuria test  02/02/2019    Pneumococcal 0-64 years Vaccine (1 of 1 - PPSV23) 10/11/2019    Diabetic retinal exam  02/21/2021    COVID-19 Vaccine (2 - Moderna 2-dose series) 03/23/2021    Lipid screen  09/18/2021    Diabetic foot exam  10/01/2021    A1C test (Diabetic or Prediabetic)  01/08/2022    Potassium monitoring  01/08/2022    Creatinine monitoring  01/08/2022    Colon cancer screen colonoscopy  09/08/2027    Flu vaccine  Completed    Hepatitis C screen  Completed    Hepatitis A vaccine  Aged Out    Hib vaccine  Aged Out    Meningococcal (ACWY) vaccine  Aged Out         Objective:     Physical Exam  Vitals signs and nursing note reviewed. Constitutional:       Appearance: Normal appearance. He is well-developed and normal weight. He is not diaphoretic. HENT:      Head: Normocephalic and atraumatic. Not macrocephalic and not microcephalic.       Right Ear: Hearing, tympanic membrane, ear canal and external ear normal. No drainage or tenderness. No middle ear effusion. No hemotympanum. Tympanic membrane is not injected, scarred, perforated or bulging. Left Ear: Hearing, tympanic membrane, ear canal and external ear normal. No drainage or tenderness. No middle ear effusion. No hemotympanum. Tympanic membrane is not injected, scarred, perforated or bulging. Nose: No nasal deformity, septal deviation, mucosal edema or rhinorrhea. Right Sinus: No maxillary sinus tenderness or frontal sinus tenderness. Left Sinus: No maxillary sinus tenderness or frontal sinus tenderness. Mouth/Throat:      Mouth: Mucous membranes are moist. No oral lesions. Dentition: Normal dentition. Does not have dentures. No dental caries or dental abscesses. Pharynx: Oropharynx is clear. No oropharyngeal exudate or posterior oropharyngeal erythema. Tonsils: No tonsillar abscesses. Eyes:      General: Lids are normal. No scleral icterus. Extraocular Movements:      Right eye: Normal extraocular motion. Left eye: Normal extraocular motion. Conjunctiva/sclera: Conjunctivae normal.      Right eye: Right conjunctiva is not injected. Left eye: Left conjunctiva is not injected. Pupils: Pupils are equal, round, and reactive to light. Neck:      Musculoskeletal: Normal range of motion and neck supple. Normal range of motion. No edema or erythema. Thyroid: No thyroid mass or thyromegaly. Vascular: No carotid bruit or JVD. Trachea: Trachea normal.   Cardiovascular:      Rate and Rhythm: Normal rate and regular rhythm. Pulses: Normal pulses. Heart sounds: Normal heart sounds, S1 normal and S2 normal. No murmur. No friction rub. No gallop. Pulmonary:      Effort: Pulmonary effort is normal. No respiratory distress. Breath sounds: Wheezing and rhonchi present. No rales. Chest:      Chest wall: No tenderness.    Abdominal:      General: Bowel sounds 2 times daily for 10 days    guaiFENesin (MUCINEX) 600 MG extended release tablet 30 tablet 0     Sig: Take 1 tablet by mouth 2 times daily for 15 days     No orders of the defined types were placed in this encounter. Patient giveneducational materials - see patient instructions. Discussed use, benefit, and side effects of prescribed medications. All patient questions answered. Pt voiced understanding. Reviewed health maintenance. Patient agreedwith treatment plan. Follow up as directed. Continue medications as prescribed.  Educational information on above diagnosis  provided per AVS.      Electronically signed by ARTURO Celeste CNP on 3/24/2021 at 9:58 AM

## 2021-03-24 NOTE — LETTER
[address]  [address]  [facility] [address]  Phone: [phone]  Fax: [phone]    ARTURO Okeefe CNP        March 25, 2021     Patient: Vikash Landers   YOB: 1955           To Whom It May Concern: It is my medical opinion that Chau Longo may be excused from work thru 3-26-21 and return to work 3-27-21. If you have any questions or concerns, please don't hesitate to call.     Sincerely,        ARTURO Okeefe CNP

## 2021-03-29 RX ORDER — EMPAGLIFLOZIN 25 MG/1
TABLET, FILM COATED ORAL
Qty: 30 TABLET | Refills: 5 | Status: SHIPPED | OUTPATIENT
Start: 2021-03-29 | End: 2021-11-03

## 2021-04-09 ENCOUNTER — OFFICE VISIT (OUTPATIENT)
Dept: CARDIOLOGY CLINIC | Age: 66
End: 2021-04-09
Payer: COMMERCIAL

## 2021-04-09 VITALS
SYSTOLIC BLOOD PRESSURE: 137 MMHG | DIASTOLIC BLOOD PRESSURE: 74 MMHG | BODY MASS INDEX: 24.46 KG/M2 | WEIGHT: 180.6 LBS | HEIGHT: 72 IN | HEART RATE: 72 BPM

## 2021-04-09 DIAGNOSIS — R06.02 SOB (SHORTNESS OF BREATH) ON EXERTION: ICD-10-CM

## 2021-04-09 DIAGNOSIS — I10 ESSENTIAL HYPERTENSION: Primary | Chronic | ICD-10-CM

## 2021-04-09 PROCEDURE — 99213 OFFICE O/P EST LOW 20 MIN: CPT | Performed by: NUCLEAR MEDICINE

## 2021-04-09 PROCEDURE — 93000 ELECTROCARDIOGRAM COMPLETE: CPT | Performed by: NUCLEAR MEDICINE

## 2021-04-09 NOTE — PROGRESS NOTES
[address] [facility] [address]  [address]  [facility] [address]  Dept: [phone]  Dept Fax: [phone]  Loc: [phone]    Visit Date: 2021    Ted Vance is a 63 y.o. male who presents todayfor:  Chief Complaint   Patient presents with    1 Year Follow Up    Hypertension    Check-Up    Diabetes     Known risk for CAD  No known CAD  No chest pain   No changes clinically   Minimal baseline dyspnea  No new issues all together  Bp is stable  No dizziness  No syncope      HPI:  HPI  Past Medical History:   Diagnosis Date    BPH (benign prostatic hyperplasia)     Depression     Diabetes mellitus (HCC)     Factor [facility] Le[facility] mutation, [facility] ([facility]     Heterozygous MTHFR mutation [id number] ([facility] [address] [facility]     Hyperglycemia     Hypertension     Hypertension     Osteoarthritis     Sleep apnea     Stroke ([facility] [address] 2013    Type II or unspecified type diabetes mellitus without mention of complication, not stated as uncontrolled 2012    Visual field cut 2013    Right visual field cut      Past Surgical History:   Procedure Laterality Date    ABDOMEN SURGERY      APPENDECTOMY      CERVICAL FUSION  08    COLONOSCOPY  12    [facility]    TONSILLECTOMY      UMBILICAL HERNIA REPAIR  2012  Dr Myron Butler     Family History   Problem Relation Age of Onset    Diabetes Father     Diabetes Brother     Stroke Brother      Social History     Tobacco Use    Smoking status: Former Smoker     Packs/day: 0.50     Years: 40.00     Pack years: 20.00     Types: E-Cigarettes     Quit date: 2013     Years since quittin.6    Smokeless tobacco: Never Used    Tobacco comment: States he uses Vap   Substance Use Topics    Alcohol use:  Yes     Alcohol/week: 0.0 standard drinks     Comment: social      Current Outpatient Medications   Medication Sig Dispense Refill    JARDIANCE 25 MG tablet take 1 tablet by mouth once daily 30 tablet 5    amLODIPine (NORVASC) 10 MG tablet TAKE 1 TABLET BY MOUTH DAILY 90 tablet 3    benazepril (LOTENSIN) 40 MG tablet TAKE 1 TABLET BY MOUTH DAILY 90 tablet 3    aspirin 325 MG EC tablet take 1 tablet by mouth once daily 90 tablet 3    Blood Glucose Monitoring Suppl (ACCU-CHEK GUIDE) w/Device KIT CHECK BLOOD SUGAR DAILY      VORTIoxetine (TRINTELLIX) 10 MG TABS tablet take 1 tablet by mouth daily 90 tablet 3    triamcinolone (NASACORT ALLERGY 24HR) 55 MCG/ACT nasal inhaler 2 sprays by Each Nostril route daily 1 Inhaler 2    Semaglutide, 1 MG/DOSE, 2 MG/1.5ML SOPN Inject 1 mg into the skin once a week 4 pen 11    TRUEplus Lancets 33G MISC USE TWICE A  each 3    blood glucose test strips (ACCU-CHEK SMARTVIEW) strip CHECK BLOOD SUGAR TWICE A DAY AS NEEDED FOR SYMPTOMS OF IRREGULAR BLOOD GLUCOSE 100 strip 3    Apple Sudhir Vn-Grn Tea-Bit Or-Cr (APPLE CIDER VINEGAR PLUS) TABS Take by mouth      ACCU-CHEK SMARTVIEW strip TEST BLOOD SUGAR ONCE A DAY 50 strip 11    Cholecalciferol (VITAMIN D3) 2000 units CAPS Take by mouth      latanoprost (XALATAN) 0.005 % ophthalmic solution Place 1 drop into both eyes daily       Esomeprazole Magnesium (NEXIUM 24HR PO) Take by mouth      OLIVE LEAF PO Take by mouth      NONFORMULARY daily PROSTATE SUPPORT      multivitamin (THERAGRAN) per tablet Take 1 tablet by mouth daily.  Lancets MISC Use qd  Dx:  250.02 50 each 11    cetirizine (ZYRTEC) 10 MG tablet Take 10 mg by mouth daily. No current facility-administered medications for this visit.       No Known Allergies  Health Maintenance   Topic Date Due    HIV screen  Never done    DTaP/Tdap/Td vaccine (1 - Tdap) Never done    Shingles Vaccine (1 of 2) Never done    Diabetic microalbuminuria test  02/02/2019    Pneumococcal 0-64 years Vaccine (1 of 1 - PPSV23) 10/11/2019    Diabetic retinal exam  02/21/2021    Lipid screen  09/18/2021    Diabetic foot exam  10/01/2021    A1C test (Diabetic or Prediabetic)  01/08/2022    Potassium monitoring  01/08/2022    Creatinine monitoring  01/08/2022    Colon cancer screen colonoscopy  09/08/2027    Flu vaccine  Completed    COVID-19 Vaccine  Completed    Hepatitis C screen  Completed    Hepatitis A vaccine  Aged Out    Hib vaccine  Aged Out    Meningococcal (ACWY) vaccine  Aged Out       Subjective:  Review of Systems  General:   No fever, no chills, No fatigue or weight loss  Pulmonary:    No dyspnea, no wheezing  Cardiac:    Denies recent chest pain,   GI:     No nausea or vomiting, no abdominal pain  Neuro:    No dizziness or light headedness,   Musculoskeletal:  No recent active issues  Extremities:   No edema, no obvious claudication       Objective:  Physical Exam  /74   Pulse 72   Ht 6' (1.829 m)   Wt 180 lb 9.6 oz (81.9 kg)   BMI 24.49 kg/m²   General:   Well developed, well nourished  Lungs:   Clear to auscultation  Heart:    Normal S1 S2, Slight murmur. no rubs, no gallops  Abdomen:   Soft, non tender, no organomegalies, positive bowel sounds  Extremities:   No edema, no cyanosis, good peripheral pulses  Neurological:   Awake, alert, oriented. No obvious focal deficits  Musculoskelatal:  No obvious deformities    Assessment:      Diagnosis Orders   1. Essential hypertension  EKG 12 lead   2. SOB (shortness of breath) on exertion  EKG 12 lead   as above  Cardiac fair for now  ECG in office was done today. I reviewed the ECG. No acute findings      Plan:  No follow-ups on file. As above  Continue risk factor modification and medical management  Thank you for allowing me to participate in the care of your patient. Please don't hesitate to contact me regarding any further issues related to the patient care    Orders Placed:  Orders Placed This Encounter   Procedures    EKG 12 lead     Order Specific Question:   Reason for Exam?     Answer:    Other       Medications Prescribed:  No orders of the defined types were placed in this encounter. Discussed use, benefit, and side effects of prescribed medications. All patient questions answered. Pt voicedunderstanding. Instructed to continue current medications, diet and exercise. Continue risk factor modification and medical management. Patient agreed with treatment plan. Follow up as directed.     Electronically signedby Avril Godinez MD on 4/9/2021 at 9:22 AM

## 2021-05-22 LAB
ANION GAP SERPL CALCULATED.3IONS-SCNC: 7 MMOL/L (ref 5–15)
AVERAGE GLUCOSE: 160 MG/DL
BUN BLDV-MCNC: 17 MG/DL (ref 5–27)
CALCIUM SERPL-MCNC: 9.6 MG/DL (ref 8.5–10.5)
CHLORIDE BLD-SCNC: 105 MMOL/L (ref 98–109)
CO2: 28 MMOL/L (ref 22–32)
CREAT SERPL-MCNC: 0.7 MG/DL (ref 0.6–1.3)
CREATINE, URINE: 67.13 MG/DL
EGFR AFRICAN AMERICAN: >60 ML/MIN/1.73SQ.M
EGFR IF NONAFRICAN AMERICAN: >60 ML/MIN/1.73SQ.M
GLUCOSE: 148 MG/DL (ref 65–99)
HBA1C MFR BLD: 7.2 % (ref 4.4–6.4)
MICROALBUMIN/CREAT 24H UR: 0.9 MG/DL (ref 0–1.9)
MICROALBUMIN/CREAT UR-RTO: 13.4 MG/G CREAT (ref 0–30)
POTASSIUM SERPL-SCNC: 4 MMOL/L (ref 3.5–5)
SODIUM BLD-SCNC: 140 MMOL/L (ref 134–146)

## 2021-05-24 ENCOUNTER — TELEPHONE (OUTPATIENT)
Dept: FAMILY MEDICINE CLINIC | Age: 66
End: 2021-05-24

## 2021-06-01 RX ORDER — SEMAGLUTIDE 1.34 MG/ML
INJECTION, SOLUTION SUBCUTANEOUS
Qty: 3 ML | Refills: 2 | Status: SHIPPED | OUTPATIENT
Start: 2021-06-01 | End: 2021-12-01 | Stop reason: SDUPTHER

## 2021-09-01 RX ORDER — SEMAGLUTIDE 1.34 MG/ML
INJECTION, SOLUTION SUBCUTANEOUS
Qty: 3 ML | Refills: 5 | Status: SHIPPED | OUTPATIENT
Start: 2021-09-01 | End: 2022-02-15

## 2021-11-02 RX ORDER — SITAGLIPTIN 100 MG/1
TABLET, FILM COATED ORAL
Qty: 90 TABLET | Refills: 3 | Status: ON HOLD | OUTPATIENT
Start: 2021-11-02 | End: 2022-05-19 | Stop reason: HOSPADM

## 2021-11-02 RX ORDER — BENAZEPRIL HYDROCHLORIDE 40 MG/1
40 TABLET, FILM COATED ORAL DAILY
Qty: 90 TABLET | Refills: 3 | Status: ON HOLD | OUTPATIENT
Start: 2021-11-02 | End: 2022-05-19 | Stop reason: SDUPTHER

## 2021-11-02 RX ORDER — AMLODIPINE BESYLATE 10 MG/1
10 TABLET ORAL DAILY
Qty: 90 TABLET | Refills: 3 | Status: ON HOLD | OUTPATIENT
Start: 2021-11-02 | End: 2022-05-19 | Stop reason: HOSPADM

## 2021-11-02 NOTE — TELEPHONE ENCOUNTER
Please approve or deny     Last Visit Date:  3/22/2021       Next Visit Date:    Visit date not found

## 2021-11-03 RX ORDER — EMPAGLIFLOZIN 25 MG/1
TABLET, FILM COATED ORAL
Qty: 30 TABLET | Refills: 5 | Status: SHIPPED | OUTPATIENT
Start: 2021-11-03 | End: 2021-12-16

## 2021-11-12 ENCOUNTER — OFFICE VISIT (OUTPATIENT)
Dept: PULMONOLOGY | Age: 66
End: 2021-11-12
Payer: COMMERCIAL

## 2021-11-12 VITALS
DIASTOLIC BLOOD PRESSURE: 72 MMHG | OXYGEN SATURATION: 98 % | HEART RATE: 85 BPM | BODY MASS INDEX: 23.73 KG/M2 | TEMPERATURE: 97.3 F | WEIGHT: 175.2 LBS | HEIGHT: 72 IN | SYSTOLIC BLOOD PRESSURE: 138 MMHG

## 2021-11-12 DIAGNOSIS — R06.3 CENTRAL SLEEP APNEA DUE TO CHEYNE-STOKES RESPIRATION: Primary | ICD-10-CM

## 2021-11-12 PROCEDURE — 99213 OFFICE O/P EST LOW 20 MIN: CPT | Performed by: PHYSICIAN ASSISTANT

## 2021-11-12 ASSESSMENT — ENCOUNTER SYMPTOMS
DIARRHEA: 0
CHEST TIGHTNESS: 0
EYES NEGATIVE: 1
STRIDOR: 0
WHEEZING: 0
ALLERGIC/IMMUNOLOGIC NEGATIVE: 1
NAUSEA: 0
COUGH: 0
BACK PAIN: 0
SHORTNESS OF BREATH: 0

## 2021-11-12 NOTE — PROGRESS NOTES
[facility]      Héctor Godwin         [id number]  11/12/2021   Chief Complaint   Patient presents with    1 Year Follow Up     CSA with download         Pt of Dr. Eduardo Cartwright    PAP Download:   Nik Ly or initial AHI: 35.4     Date of initial study: 11/14/2010      Compliant  100%     Noncompliant 0 %     PAP Type Aircurve 10 ASV Level  6/15/0/18   Avg Hrs/Day 7 hr 46 min  AHI: 1.2   Recorded compliance dates , 10/11/2021  to 11/9/2021   Machine/Mfg:   [x] ResMed    [] Respironics/Dreamstation   Interface:   [x] Nasal    [] Nasal pillows   [] FFM      Provider:      [] -HMOSWALDO     [x]Sachin     [] Mohamud    [] Megan Dick    [] Tristian               [] [facility]      [] Adaptive    [] [address]      [] Other    Neck Size: 17  Mallampati Mallampati 4  ESS:  2  SAQLI: 95    Here is a scan of the most recent download:              Presentation:   Yosef Coronado presents for sleep medicine follow up for obstructive sleep apnea  Since the last visit, Yosef Coronado is doing well with PAP. He is sleeping well and feels rested. Complaining of dry mouth. Equipment issues: The pressure is  acceptable, the mask is acceptable     Sleep issues:  Do you feel better? Yes  More rested? Yes   Better concentration? yes    Progress History:   Since last visit any new medical issues? No  New ER or hospital visits? No  Any new or changes in medicines? No  Any new sleep medicines? No    Review of Systems -   Review of Systems   Constitutional: Negative for activity change, appetite change, chills and fever. HENT: Negative for congestion and postnasal drip. Eyes: Negative. Respiratory: Negative for cough, chest tightness, shortness of breath, wheezing and stridor. Cardiovascular: Negative for chest pain and leg swelling. Gastrointestinal: Negative for diarrhea and nausea. Endocrine: Negative. Genitourinary: Negative. Musculoskeletal: Negative.   Negative for arthralgias and back pain.   Skin: Negative. Allergic/Immunologic: Negative. Neurological: Negative. Negative for dizziness and light-headedness. Psychiatric/Behavioral: Negative. All other systems reviewed and are negative. Physical Exam:    BMI:  Body mass index is 23.76 kg/m². Wt Readings from Last 3 Encounters:   11/12/21 175 lb 3.2 oz (79.5 kg)   04/09/21 180 lb 9.6 oz (81.9 kg)   03/22/21 170 lb (77.1 kg)     Weight stable / unchanged  Vitals: /72 (Site: Left Upper Arm, Position: Sitting)   Pulse 85   Temp 97.3 °F (36.3 °C)   Ht 6' (1.829 m)   Wt 175 lb 3.2 oz (79.5 kg)   SpO2 98% Comment: on ra  BMI 23.76 kg/m²       Physical Exam  Constitutional:       Appearance: He is well-developed. HENT:      Head: Normocephalic and atraumatic. Right Ear: External ear normal.      Left Ear: External ear normal.   Eyes:      Conjunctiva/sclera: Conjunctivae normal.      Pupils: Pupils are equal, round, and reactive to light. Cardiovascular:      Rate and Rhythm: Normal rate and regular rhythm. Heart sounds: Normal heart sounds. Pulmonary:      Effort: Pulmonary effort is normal.      Breath sounds: Normal breath sounds. Musculoskeletal:         General: Normal range of motion. Cervical back: Normal range of motion and neck supple. Skin:     General: Skin is warm and dry. Neurological:      Mental Status: He is alert and oriented to person, place, and time. Psychiatric:         Behavior: Behavior normal.         Thought Content: Thought content normal.         Judgment: Judgment normal.         ASSESSMENT/DIAGNOSIS     Diagnosis Orders   1. Central sleep apnea due to Cheyne-Spence respiration            Plan   Do you need any equipment today? Yes update supplies  - Download reviewed and discussed with patient  - He  was advised to continue current positive airway pressure therapy with above described pressure.    - He  advised to keep good compliance with current recommended pressure to get optimal results and clinical improvement  - Recommend 7-9 hours of sleep with PAP  - He was advised to call DME company regarding supplies if needed.   -He call my office for earlier appointment if needed for worsening of sleep symptoms.   - He was instructed on weight loss  - Bruce was educated about my impression and plan. Patient verbalizesunderstanding.   We will see Bruce Cooper back in: 1 years with download    Information added by my medical assistant/LPN was reviewed today          Mitchell Lopez PA-C, MPAS  11/12/2021

## 2021-11-12 NOTE — PATIENT INSTRUCTIONS
Patient Education        Stopping Smoking: Care Instructions  Your Care Instructions     Cigarette smokers crave the nicotine in cigarettes. Giving it up is much harder than simply changing a habit. Your body has to stop craving the nicotine. It is hard to quit, but you can do it. There are many tools that people use to quit smoking. You may find that combining tools works best for you. There are several steps to quitting. First you get ready to quit. Then you get support to help you. After that, you learn new skills and behaviors to become a nonsmoker. For many people, a necessary step is getting and using medicine. Your doctor will help you set up the plan that best meets your needs. You may want to attend a smoking cessation program to help you quit smoking. When you choose a program, look for one that has proven success. Ask your doctor for ideas. You will greatly increase your chances of success if you take medicine as well as get counseling or join a cessation program.  Some of the changes you feel when you first quit tobacco are uncomfortable. Your body will miss the nicotine at first, and you may feel short-tempered and grumpy. You may have trouble sleeping or concentrating. Medicine can help you deal with these symptoms. You may struggle with changing your smoking habits and rituals. The last step is the tricky one: Be prepared for the smoking urge to continue for a time. This is a lot to deal with, but keep at it. You will feel better. Follow-up care is a key part of your treatment and safety. Be sure to make and go to all appointments, and call your doctor if you are having problems. It's also a good idea to know your test results and keep a list of the medicines you take. How can you care for yourself at home? · Ask your family, friends, and coworkers for support. You have a better chance of quitting if you have help and support.   · Join a support group, such as Nicotine Anonymous, for people who are trying to quit smoking. · Consider signing up for a smoking cessation program, such as the American Lung Association's Freedom from Smoking program.  · Get text messaging support. Go to the website at www.smokefree. gov to sign up for the [facility] program.  · Set a quit date. Pick your date carefully so that it is not right in the middle of a big deadline or stressful time. Once you quit, do not even take a puff. Get rid of all ashtrays and lighters after your last cigarette. Clean your house and your clothes so that they do not smell of smoke. · Learn how to be a nonsmoker. Think about ways you can avoid those things that make you reach for a cigarette. ? Avoid situations that put you at greatest risk for smoking. For some people, it is hard to have a drink with friends without smoking. For others, they might skip a coffee break with coworkers who smoke. ? Change your daily routine. Take a different route to work or eat a meal in a different place. · Cut down on stress. Calm yourself or release tension by doing an activity you enjoy, such as reading a book, taking a hot bath, or gardening. · Talk to your doctor or pharmacist about nicotine replacement therapy, which replaces the nicotine in your body. You still get nicotine but you do not use tobacco. Nicotine replacement products help you slowly reduce the amount of nicotine you need. These products come in several forms, many of them available over-the-counter:  ? Nicotine patches  ? Nicotine gum and lozenges  ? Nicotine inhaler  · Ask your doctor about bupropion (Wellbutrin) or varenicline (Chantix), which are prescription medicines. They do not contain nicotine. They help you by reducing withdrawal symptoms, such as stress and anxiety. · Some people find hypnosis, acupuncture, and massage helpful for ending the smoking habit. · Eat a healthy diet and get regular exercise.  Having healthy habits will help your body move past its craving for nicotine. · Be prepared to keep trying. Most people are not successful the first few times they try to quit. Do not get mad at yourself if you smoke again. Make a list of things you learned and think about when you want to try again, such as next week, next month, or next year. Where can you learn more? Go to https://[employer]. org and sign in to your [employer] account. Enter T840 in the [employer] box to learn more about \"Stopping Smoking: Care Instructions. \"     If you do not have an account, please click on the \"Sign Up Now\" link. Current as of: February 11, 2021               Content Version: 13.0  © 2006-2021 Healthwise, Incorporated. Care instructions adapted under license by [facility] ([facility]). If you have questions about a medical condition or this instruction, always ask your healthcare professional. [address] any warranty or liability for your use of this information.

## 2021-12-01 ENCOUNTER — OFFICE VISIT (OUTPATIENT)
Dept: FAMILY MEDICINE CLINIC | Age: 66
End: 2021-12-01
Payer: COMMERCIAL

## 2021-12-01 VITALS
WEIGHT: 178.6 LBS | SYSTOLIC BLOOD PRESSURE: 120 MMHG | BODY MASS INDEX: 24.19 KG/M2 | HEIGHT: 72 IN | OXYGEN SATURATION: 97 % | HEART RATE: 77 BPM | DIASTOLIC BLOOD PRESSURE: 70 MMHG | TEMPERATURE: 98.5 F

## 2021-12-01 DIAGNOSIS — E11.9 DM TYPE 2, GOAL HBA1C < 7% (HCC): ICD-10-CM

## 2021-12-01 DIAGNOSIS — M54.16 LEFT LUMBAR RADICULOPATHY: Primary | ICD-10-CM

## 2021-12-01 DIAGNOSIS — E11.9 ENCOUNTER FOR DIABETIC FOOT EXAM (HCC): ICD-10-CM

## 2021-12-01 LAB — HBA1C MFR BLD: 6.8 %

## 2021-12-01 PROCEDURE — 83036 HEMOGLOBIN GLYCOSYLATED A1C: CPT | Performed by: NURSE PRACTITIONER

## 2021-12-01 PROCEDURE — 99214 OFFICE O/P EST MOD 30 MIN: CPT | Performed by: NURSE PRACTITIONER

## 2021-12-01 RX ORDER — PREDNISONE 20 MG/1
20 TABLET ORAL 2 TIMES DAILY
Qty: 10 TABLET | Refills: 0 | Status: SHIPPED | OUTPATIENT
Start: 2021-12-01 | End: 2021-12-06

## 2021-12-01 RX ORDER — CYCLOBENZAPRINE HCL 5 MG
5 TABLET ORAL 3 TIMES DAILY PRN
Status: ON HOLD | COMMUNITY
End: 2022-05-19 | Stop reason: HOSPADM

## 2021-12-01 RX ORDER — OXYCODONE HYDROCHLORIDE AND ACETAMINOPHEN 5; 325 MG/1; MG/1
1 TABLET ORAL EVERY 8 HOURS PRN
Qty: 6 TABLET | Refills: 0 | Status: SHIPPED | OUTPATIENT
Start: 2021-12-01 | End: 2021-12-03

## 2021-12-01 RX ORDER — HYDROCODONE BITARTRATE AND ACETAMINOPHEN 5; 325 MG/1; MG/1
1 TABLET ORAL EVERY 6 HOURS PRN
Status: ON HOLD | COMMUNITY
End: 2022-05-19 | Stop reason: HOSPADM

## 2021-12-01 SDOH — ECONOMIC STABILITY: FOOD INSECURITY: WITHIN THE PAST 12 MONTHS, THE FOOD YOU BOUGHT JUST DIDN'T LAST AND YOU DIDN'T HAVE MONEY TO GET MORE.: NEVER TRUE

## 2021-12-01 SDOH — ECONOMIC STABILITY: FOOD INSECURITY: WITHIN THE PAST 12 MONTHS, YOU WORRIED THAT YOUR FOOD WOULD RUN OUT BEFORE YOU GOT MONEY TO BUY MORE.: NEVER TRUE

## 2021-12-01 ASSESSMENT — ENCOUNTER SYMPTOMS
TROUBLE SWALLOWING: 0
CONSTIPATION: 0
COUGH: 0
EYE REDNESS: 0
ABDOMINAL PAIN: 0
ALLERGIC/IMMUNOLOGIC NEGATIVE: 1
RHINORRHEA: 0
NAUSEA: 0
BACK PAIN: 1
WHEEZING: 0
SORE THROAT: 0
VOMITING: 0
EYE DISCHARGE: 0
DIARRHEA: 0
EYE PAIN: 0
SHORTNESS OF BREATH: 0

## 2021-12-01 ASSESSMENT — SOCIAL DETERMINANTS OF HEALTH (SDOH): HOW HARD IS IT FOR YOU TO PAY FOR THE VERY BASICS LIKE FOOD, HOUSING, MEDICAL CARE, AND HEATING?: NOT HARD AT ALL

## 2021-12-01 NOTE — PROGRESS NOTES
[address] [place] [address]  Dept: [phone]  Dept Fax: [phone]  Loc: [phone]     Visit Date:  12/1/2021      Patient:  Nessa Shahid  YOB: 1955    HPI:     Chief Complaint   Patient presents with    Leg Pain     left leg pain since 11/25/2021, had xrays done at [facility] call for records)        Patient comes in complaining of severe left leg pain starting November 25 that he believes is emanating from his left lower lumbar region down into his left thigh to his knee. Patient went to orthopedics and x-rays done and was given Mobic for his pain. Patient states his pain is unmanageable. States he did take his wife's Norco and this did not really treat the pain in addition to the Mobic. 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 Patient also needs 3-month A1c. Medications    Current Outpatient Medications:     HYDROcodone-acetaminophen (NORCO) 5-325 MG per tablet, Take 1 tablet by mouth every 6 hours as needed for Pain., Disp: , Rfl:     predniSONE (DELTASONE) 20 MG tablet, Take 1 tablet by mouth 2 times daily for 5 days, Disp: 10 tablet, Rfl: 0    oxyCODONE-acetaminophen (PERCOCET) 5-325 MG per tablet, Take 1 tablet by mouth every 8 hours as needed for Pain for up to 2 days. , Disp: 6 tablet, Rfl: 0    JARDIANCE 25 MG tablet, take 1 tablet by mouth once daily, Disp: 30 tablet, Rfl: 5    benazepril (LOTENSIN) 40 MG tablet, TAKE 1 TABLET BY MOUTH DAILY, Disp: 90 tablet, Rfl: 3    amLODIPine (NORVASC) 10 MG tablet, TAKE 1 TABLET BY MOUTH DAILY, Disp: 90 tablet, Rfl: 3    JANUVIA 100 MG tablet, TAKE 1 TABLET BY MOUTH EVERY DAY, Disp: 90 tablet, Rfl: 3    aspirin 325 MG EC tablet, take 1 tablet by mouth once daily, Disp: 90 tablet, Rfl: 3    OZEMPIC, 1 MG/DOSE, 4 MG/3ML SOPN, INJECT 1 MG INTO THE SKIN ONCE A WEEK, Disp: 3 mL, Rfl: 5    VORTIoxetine (TRINTELLIX) 10 MG TABS tablet, take 1 tablet by mouth once daily, Disp: 90 tablet, Rfl: 1    Blood Glucose Monitoring Suppl (ACCU-CHEK GUIDE) w/Device KIT, CHECK BLOOD SUGAR DAILY, Disp: , Rfl:     triamcinolone (NASACORT ALLERGY 24HR) 55 MCG/ACT nasal inhaler, 2 sprays by Each Nostril route daily, Disp: 1 Inhaler, Rfl: 2    TRUEplus Lancets 33G MISC, USE TWICE A DAY, Disp: 100 each, Rfl: 3    blood glucose test strips (ACCU-CHEK SMARTVIEW) strip, CHECK BLOOD SUGAR TWICE A DAY AS NEEDED FOR SYMPTOMS OF IRREGULAR BLOOD GLUCOSE, Disp: 100 strip, Rfl: 3    Apple Sudhir Vn-Grn Tea-Bit Or-Cr (APPLE CIDER VINEGAR PLUS) TABS, Take by mouth, Disp: , Rfl:     ACCU-CHEK SMARTVIEW strip, TEST BLOOD SUGAR ONCE A DAY, Disp: 50 strip, Rfl: 11    Cholecalciferol (VITAMIN D3) 2000 units CAPS, Take by mouth, Disp: , Rfl:     latanoprost (XALATAN) 0.005 % ophthalmic solution, Place 1 drop into both eyes daily , Disp: , Rfl:     Esomeprazole Magnesium (NEXIUM 24HR PO), Take by mouth, Disp: , Rfl:     NONFORMULARY, daily PROSTATE SUPPORT, Disp: , Rfl:     multivitamin (THERAGRAN) per tablet, Take 1 tablet by mouth daily. , Disp: , Rfl:     Lancets MISC, Use qd Dx:  250.02, Disp: 50 each, Rfl: 11    cetirizine (ZYRTEC) 10 MG tablet, Take 10 mg by mouth daily. , Disp: , Rfl:     cyclobenzaprine (FLEXERIL) 5 MG tablet, Take 5 mg by mouth 3 times daily as needed for Muscle spasms, Disp: , Rfl:     OLIVE LEAF PO, Take by mouth (Patient not taking: Reported on 12/1/2021), Disp: , Rfl:     The patient has No Known Allergies.     Past Medical History  Bruce  has a past medical history of BPH (benign prostatic hyperplasia), Depression, Diabetes mellitus ([facility] Factor 5 Leiden mutation, heterozygous ([facility] [facility] Heterozygous MTHFR mutation [id number], Hyperglycemia, Hypertension, Hypertension, Osteoarthritis, Sleep apnea, Stroke ([facility] [address] Type II or unspecified type diabetes mellitus without mention of complication, not stated as uncontrolled, and Visual field cut. Subjective:      Review of Systems   Constitutional: Negative for activity change, fatigue and fever. HENT: Negative for congestion, ear pain, rhinorrhea, sore throat and trouble swallowing. Eyes: Negative for pain, discharge and redness. Respiratory: Negative for cough, shortness of breath and wheezing. Cardiovascular: Negative. Gastrointestinal: Negative for abdominal pain, constipation, diarrhea, nausea and vomiting. Endocrine: Negative. Genitourinary: Negative for dysuria, frequency and urgency. Musculoskeletal: Positive for back pain and myalgias. Negative for arthralgias. Skin: Negative for rash. Allergic/Immunologic: Negative. Neurological: Negative for dizziness, tremors, weakness and headaches. Hematological: Negative. Psychiatric/Behavioral: Negative for dysphoric mood and sleep disturbance. The patient is not nervous/anxious. Objective:     /70   Pulse 77   Temp 98.5 °F (36.9 °C) (Oral)   Ht 6' 0.25\" (1.835 m)   Wt 178 lb 9.6 oz (81 kg)   SpO2 97%   BMI 24.06 kg/m²     Physical Exam  Constitutional:       General: He is not in acute distress. Appearance: He is well-developed. He is not diaphoretic. HENT:      Right Ear: External ear normal.      Left Ear: External ear normal.      Nose: Nose normal.   Eyes:      General:         Right eye: No discharge. Left eye: No discharge. Conjunctiva/sclera: Conjunctivae normal.      Pupils: Pupils are equal, round, and reactive to light. Neck:      Vascular: No JVD. Cardiovascular:      Rate and Rhythm: Normal rate and regular rhythm. Pulmonary:      Effort: Pulmonary effort is normal. No respiratory distress. Musculoskeletal:         General: Tenderness present. No deformity or signs of injury. Normal range of motion. Cervical back: Normal range of motion. Skin:     General: Skin is warm and dry.       Capillary Refill: Capillary

## 2021-12-16 ENCOUNTER — OFFICE VISIT (OUTPATIENT)
Dept: CARDIOLOGY CLINIC | Age: 66
End: 2021-12-16
Payer: COMMERCIAL

## 2021-12-16 VITALS
WEIGHT: 175.6 LBS | SYSTOLIC BLOOD PRESSURE: 133 MMHG | BODY MASS INDEX: 23.78 KG/M2 | HEIGHT: 72 IN | HEART RATE: 84 BPM | DIASTOLIC BLOOD PRESSURE: 78 MMHG

## 2021-12-16 DIAGNOSIS — I10 PRIMARY HYPERTENSION: Primary | ICD-10-CM

## 2021-12-16 DIAGNOSIS — Z01.818 PRE-OP EVALUATION: ICD-10-CM

## 2021-12-16 PROCEDURE — 99213 OFFICE O/P EST LOW 20 MIN: CPT | Performed by: NUCLEAR MEDICINE

## 2021-12-16 RX ORDER — MELOXICAM 15 MG/1
15 TABLET ORAL DAILY
COMMUNITY
End: 2021-12-27

## 2021-12-16 NOTE — PROGRESS NOTES
[address] [facility] [address]  [address]  [facility] [address]  Dept: [phone]  Dept Fax: [phone]  Loc: [phone]    Visit Date: 2021    Ryan Knight is a 61 y.o. male who presents todayfor:  Chief Complaint   Patient presents with    Check-Up    Diabetes    Hypertension     Has risk for CAD  No symptoms  Normal ECG   No chest pain   No dyspnea  No new symptoms  He is active  Stress test 2019 was okay   Going for back surgery   Bp is stable  No dizziness  No syncope      HPI:  HPI  Past Medical History:   Diagnosis Date    BPH (benign prostatic hyperplasia)     Depression     Diabetes mellitus (HCC)     Factor 5 Leiden mutation, heterozygous ([facility]     Heterozygous MTHFR mutation [id number]     Hyperglycemia     Hypertension     Hypertension     Osteoarthritis     Sleep apnea     Stroke ([facility] 2013    Type II or unspecified type diabetes mellitus without mention of complication, not stated as uncontrolled 2012    Visual field cut 2013    Right visual field cut      Past Surgical History:   Procedure Laterality Date    ABDOMEN SURGERY      APPENDECTOMY      CERVICAL FUSION  08    [facility]ONOSCOPY  12    [facility]    TONSILLECTOMY      UMBILICAL HERNIA REPAIR  2012  Dr Reid Weaver     Family History   Problem Relation Age of Onset    Diabetes Father     Diabetes Brother     Stroke Brother      Social History     Tobacco Use    Smoking status: Current Every Day Smoker     Packs/day: 0.50     Years: 40.00     Pack years: 20.00     Types: E-Cigarettes     Last attempt to quit: 2013     Years since quittin.3    Smokeless tobacco: Never Used    Tobacco comment: States he uses Vap   Substance Use Topics    Alcohol use:  Yes     Alcohol/week: 0.0 standard drinks     Comment: social      Current Outpatient Medications   Medication Sig Dispense Refill    meloxicam (MOBIC) 15 MG tablet Take 15 mg by mouth daily      HYDROcodone-acetaminophen (NORCO) 5-325 MG per tablet Take 1 tablet by mouth every 6 hours as needed for Pain.  cyclobenzaprine (FLEXERIL) 5 MG tablet Take 5 mg by mouth 3 times daily as needed for Muscle spasms      benazepril (LOTENSIN) 40 MG tablet TAKE 1 TABLET BY MOUTH DAILY 90 tablet 3    amLODIPine (NORVASC) 10 MG tablet TAKE 1 TABLET BY MOUTH DAILY 90 tablet 3    JANUVIA 100 MG tablet TAKE 1 TABLET BY MOUTH EVERY DAY 90 tablet 3    aspirin 325 MG EC tablet take 1 tablet by mouth once daily 90 tablet 3    OZEMPIC, 1 MG/DOSE, 4 MG/3ML SOPN INJECT 1 MG INTO THE SKIN ONCE A WEEK 3 mL 5    VORTIoxetine (TRINTELLIX) 10 MG TABS tablet take 1 tablet by mouth once daily 90 tablet 1    Blood Glucose Monitoring Suppl (ACCU-CHEK GUIDE) w/Device KIT CHECK BLOOD SUGAR DAILY      triamcinolone (NASACORT ALLERGY 24HR) 55 MCG/ACT nasal inhaler 2 sprays by Each Nostril route daily 1 Inhaler 2    TRUEplus Lancets 33G MISC USE TWICE A  each 3    blood glucose test strips (ACCU-CHEK SMARTVIEW) strip CHECK BLOOD SUGAR TWICE A DAY AS NEEDED FOR SYMPTOMS OF IRREGULAR BLOOD GLUCOSE 100 strip 3    Apple Sudhir Vn-Grn Tea-Bit Or-Cr (APPLE CIDER VINEGAR PLUS) TABS Take by mouth      ACCU-CHEK SMARTVIEW strip TEST BLOOD SUGAR ONCE A DAY 50 strip 11    Cholecalciferol (VITAMIN D3) 2000 units CAPS Take by mouth      latanoprost (XALATAN) 0.005 % ophthalmic solution Place 1 drop into both eyes daily       Esomeprazole Magnesium (NEXIUM 24HR PO) Take by mouth      OLIVE LEAF PO Take by mouth       NONFORMULARY daily PROSTATE SUPPORT      multivitamin (THERAGRAN) per tablet Take 1 tablet by mouth daily.  Lancets MISC Use qd  Dx:  250.02 50 each 11    cetirizine (ZYRTEC) 10 MG tablet Take 10 mg by mouth daily. No current facility-administered medications for this visit.      No Known Allergies  Health Maintenance   Topic Date Due    HIV screen  Never done    DTaP/Tdap/Td vaccine (1 - Tdap) Never done    Shingles Vaccine (1 of 2) Never done    Low dose CT lung screening  Never done    Pneumococcal 0-64 years Vaccine (1 of 2 - PPSV23) 10/11/2019    Diabetic retinal exam  02/21/2021    Flu vaccine (1) 09/01/2021    Lipid screen  09/18/2021    Diabetic microalbuminuria test  05/21/2022    Potassium monitoring  05/21/2022    Creatinine monitoring  05/21/2022    Diabetic foot exam  12/01/2022    A1C test (Diabetic or Prediabetic)  12/01/2022    Colon cancer screen colonoscopy  09/08/2027    COVID-19 Vaccine  Completed    Hepatitis C screen  Completed    Hepatitis A vaccine  Aged Out    Hib vaccine  Aged Out    Meningococcal (ACWY) vaccine  Aged Out       Subjective:  Review of Systems  General:   No fever, no chills, No fatigue or weight loss  Pulmonary:    no dyspnea, no wheezing  Cardiac:    Denies recent chest pain,   GI:     No nausea or vomiting, no abdominal pain  Neuro:    No dizziness or light headedness,   Musculoskeletal:  No recent active issues  Extremities:   No edema, no obvious claudication       Objective:  Physical Exam  /78   Pulse 84   Ht 6' (1.829 m)   Wt 175 lb 9.6 oz (79.7 kg)   BMI 23.82 kg/m²   General:   Well developed, well nourished  Lungs:   Clear to auscultation  Heart:    Normal S1 S2, Slight murmur. no rubs, no gallops  Abdomen:   Soft, non tender, no organomegalies, positive bowel sounds  Extremities:   No edema, no cyanosis, good peripheral pulses  Neurological:   Awake, alert, oriented. No obvious focal deficits  Musculoskelatal:  No obvious deformities    Assessment:      Diagnosis Orders   1. Primary hypertension     2. Pre-op evaluation     as above  Non contraindication for surgery   Normal ECG     Plan:  No follow-ups on file. As above  Clear for surgery   No indication to repeat stress test   Continue risk factor modification and medical management  Thank you for allowing me to participate in the care of your patient.  Please don't hesitate to contact me regarding any further issues related to the patient care    Orders Placed:  No orders of the defined types were placed in this encounter. Medications Prescribed:  No orders of the defined types were placed in this encounter. Discussed use, benefit, and side effects of prescribed medications. All patient questions answered. Pt voicedunderstanding. Instructed to continue current medications, diet and exercise. Continue risk factor modification and medical management. Patient agreed with treatment plan. Follow up as directed.     Electronically signedby Everton Aaron MD on 12/16/2021 at 11:16 AM

## 2021-12-27 ENCOUNTER — OFFICE VISIT (OUTPATIENT)
Dept: FAMILY MEDICINE CLINIC | Age: 66
End: 2021-12-27
Payer: COMMERCIAL

## 2021-12-27 VITALS
BODY MASS INDEX: 23.46 KG/M2 | DIASTOLIC BLOOD PRESSURE: 60 MMHG | WEIGHT: 173 LBS | OXYGEN SATURATION: 96 % | TEMPERATURE: 99.1 F | SYSTOLIC BLOOD PRESSURE: 138 MMHG | RESPIRATION RATE: 16 BRPM | HEART RATE: 88 BPM

## 2021-12-27 DIAGNOSIS — E11.9 DM TYPE 2, GOAL HBA1C < 7% (HCC): ICD-10-CM

## 2021-12-27 DIAGNOSIS — I10 ESSENTIAL HYPERTENSION: ICD-10-CM

## 2021-12-27 DIAGNOSIS — M54.16 LEFT LUMBAR RADICULOPATHY: ICD-10-CM

## 2021-12-27 DIAGNOSIS — Z01.818 PREOP EXAMINATION: Primary | ICD-10-CM

## 2021-12-27 PROCEDURE — 99212 OFFICE O/P EST SF 10 MIN: CPT | Performed by: NURSE PRACTITIONER

## 2021-12-27 PROCEDURE — 90674 CCIIV4 VAC NO PRSV 0.5 ML IM: CPT | Performed by: NURSE PRACTITIONER

## 2021-12-27 PROCEDURE — 90471 IMMUNIZATION ADMIN: CPT | Performed by: NURSE PRACTITIONER

## 2021-12-27 ASSESSMENT — ENCOUNTER SYMPTOMS
WHEEZING: 0
COUGH: 0
CHEST TIGHTNESS: 0
BACK PAIN: 1
ABDOMINAL PAIN: 0
ABDOMINAL DISTENTION: 0
SHORTNESS OF BREATH: 0

## 2021-12-27 NOTE — PROGRESS NOTES
Immunizations Administered     Name Date Dose Route    Influenza, MDCK Quadv, IM, PF (Flucelvax 2 yrs and older) 12/27/2021 0.5 mL Intramuscular    Site: Deltoid- Left    Lot: [id number]    [facility]: [id number]

## 2021-12-27 NOTE — PROGRESS NOTES
[address] [place] [facility] [address]  Dept: [phone]  Dept Fax: [phone]  Loc: [phone]  PROGRESS NOTE      VisitDate: 12/27/2021    Jean-Paul Lazo is a 61 y.o. male who presents today for:     Chief Complaint   Patient presents with    Pre-op Exam     01/03/2021 w/Allan for L4-5 lamiectomy/fusion. All testing done at [facility].  Flu Vaccine     discuss         Subjective:  Patient presents for preop examination for L4-5 laminectomy/fusion per Dr Eddie Haider 1/3/2022. History of type 2 diabetes, CVA, sleep apnea osteoarthritis hypertension MTHFR mutation factor V Leiden BPH, depression. Review of Systems   Constitutional: Negative for activity change, appetite change, chills, fatigue and fever. Eyes: Negative for visual disturbance. Respiratory: Negative for cough, chest tightness, shortness of breath and wheezing. Cardiovascular: Negative for chest pain, palpitations and leg swelling. Gastrointestinal: Negative for abdominal distention and abdominal pain. Genitourinary: Negative for dysuria. Musculoskeletal: Positive for arthralgias and back pain. Negative for neck pain. Skin: Negative. Negative for rash. Neurological: Negative for dizziness, light-headedness and headaches. Hematological: Negative for adenopathy. Psychiatric/Behavioral: Positive for decreased concentration and dysphoric mood. All other systems reviewed and are negative.     Past Medical History:   Diagnosis Date    BPH (benign prostatic hyperplasia)     Depression     Diabetes mellitus (HCC)     Factor 5 Leiden mutation, heterozygous ([facility] [address]     Heterozygous MTHFR mutation [id number]     Hyperglycemia     Hypertension     Hypertension     Osteoarthritis     Sleep apnea     Stroke ([facility] [address] 8/8/2013    Type II or unspecified type diabetes mellitus without mention of complication, not stated as uncontrolled 8/2012    Visual field cut 2013    Right visual field cut      Past Surgical History:   Procedure Laterality Date    ABDOMEN SURGERY      APPENDECTOMY      CERVICAL FUSION  08    COLONOSCOPY  12    [address]     TONSILLECTOMY      UMBILICAL HERNIA REPAIR  2012  Dr Edward Fuentes     Family History   Problem Relation Age of Onset    Diabetes Father     Diabetes Brother     Stroke Brother      Social History     Tobacco Use    Smoking status: Current Every Day Smoker     Packs/day: 0.50     Years: 40.00     Pack years: 20.00     Types: E-Cigarettes     Last attempt to quit: 2013     Years since quittin.3    Smokeless tobacco: Never Used    Tobacco comment: States he uses Vap   Substance Use Topics    Alcohol use: Yes     Alcohol/week: 0.0 standard drinks     Comment: social      Current Outpatient Medications   Medication Sig Dispense Refill    HYDROcodone-acetaminophen (NORCO) 5-325 MG per tablet Take 1 tablet by mouth every 6 hours as needed for Pain.       cyclobenzaprine (FLEXERIL) 5 MG tablet Take 5 mg by mouth 3 times daily as needed for Muscle spasms      benazepril (LOTENSIN) 40 MG tablet TAKE 1 TABLET BY MOUTH DAILY 90 tablet 3    amLODIPine (NORVASC) 10 MG tablet TAKE 1 TABLET BY MOUTH DAILY 90 tablet 3    JANUVIA 100 MG tablet TAKE 1 TABLET BY MOUTH EVERY DAY 90 tablet 3    aspirin 325 MG EC tablet take 1 tablet by mouth once daily 90 tablet 3    OZEMPIC, 1 MG/DOSE, 4 MG/3ML SOPN INJECT 1 MG INTO THE SKIN ONCE A WEEK 3 mL 5    VORTIoxetine (TRINTELLIX) 10 MG TABS tablet take 1 tablet by mouth once daily 90 tablet 1    Blood Glucose Monitoring Suppl (ACCU-CHEK GUIDE) w/Device KIT CHECK BLOOD SUGAR DAILY      triamcinolone (NASACORT ALLERGY 24HR) 55 MCG/ACT nasal inhaler 2 sprays by Each Nostril route daily 1 Inhaler 2    TRUEplus Lancets 33G MISC USE TWICE A  each 3    blood glucose test strips (ACCU-CHEK SMARTVIEW) strip CHECK BLOOD SUGAR TWICE A DAY AS NEEDED FOR SYMPTOMS OF IRREGULAR BLOOD GLUCOSE 100 strip 3    Apple Sudhir Vn-Grn Tea-Bit Or-Cr (APPLE CIDER VINEGAR PLUS) TABS Take by mouth      ACCU-CHEK SMARTVIEW strip TEST BLOOD SUGAR ONCE A DAY 50 strip 11    Cholecalciferol (VITAMIN D3) 2000 units CAPS Take by mouth      latanoprost (XALATAN) 0.005 % ophthalmic solution Place 1 drop into both eyes daily       Esomeprazole Magnesium (NEXIUM 24HR PO) Take by mouth      OLIVE LEAF PO Take by mouth       NONFORMULARY daily PROSTATE SUPPORT      multivitamin (THERAGRAN) per tablet Take 1 tablet by mouth daily.  Lancets MISC Use qd  Dx:  250.02 50 each 11    cetirizine (ZYRTEC) 10 MG tablet Take 10 mg by mouth daily. No current facility-administered medications for this visit. No Known Allergies  Health Maintenance   Topic Date Due    HIV screen  Never done    DTaP/Tdap/Td vaccine (1 - Tdap) Never done    Shingles Vaccine (1 of 2) Never done    Low dose CT lung screening  Never done    Pneumococcal 0-64 years Vaccine (1 of 2 - PPSV23) 10/11/2019    Diabetic retinal exam  02/21/2021    Flu vaccine (1) 09/01/2021    Lipid screen  09/18/2021    Diabetic microalbuminuria test  05/21/2022    Diabetic foot exam  12/01/2022    A1C test (Diabetic or Prediabetic)  12/01/2022    Potassium monitoring  12/15/2022    Creatinine monitoring  12/15/2022    Colon cancer screen colonoscopy  09/08/2027    COVID-19 Vaccine  Completed    Hepatitis C screen  Completed    Hepatitis A vaccine  Aged Out    Hib vaccine  Aged Out    Meningococcal (ACWY) vaccine  Aged Out                                                           Objective:     Physical Exam  Vitals and nursing note reviewed. Constitutional:       Appearance: Normal appearance. He is well-developed and normal weight. He is not diaphoretic. HENT:      Head: Normocephalic and atraumatic. Not macrocephalic and not microcephalic.       Right Ear: Hearing, tympanic membrane, ear canal and external ear normal. No drainage or tenderness. No middle ear effusion. No hemotympanum. Tympanic membrane is not injected, scarred, perforated or bulging. Left Ear: Hearing, tympanic membrane, ear canal and external ear normal. No drainage or tenderness. No middle ear effusion. No hemotympanum. Tympanic membrane is not injected, scarred, perforated or bulging. Nose: Nose normal. No nasal deformity, septal deviation, mucosal edema or rhinorrhea. Right Sinus: No maxillary sinus tenderness or frontal sinus tenderness. Left Sinus: No maxillary sinus tenderness or frontal sinus tenderness. Mouth/Throat:      Mouth: No oral lesions. Dentition: Normal dentition. Does not have dentures. No dental caries or dental abscesses. Pharynx: Oropharynx is clear. No oropharyngeal exudate or posterior oropharyngeal erythema. Tonsils: No tonsillar abscesses. Eyes:      General: Lids are normal. No scleral icterus. Extraocular Movements: Extraocular movements intact. Right eye: Normal extraocular motion. Left eye: Normal extraocular motion. Conjunctiva/sclera: Conjunctivae normal.      Right eye: Right conjunctiva is not injected. Left eye: Left conjunctiva is not injected. Pupils: Pupils are equal, round, and reactive to light. Neck:      Thyroid: No thyroid mass or thyromegaly. Vascular: No carotid bruit or JVD. Trachea: Trachea normal.   Cardiovascular:      Rate and Rhythm: Normal rate and regular rhythm. Pulses: Normal pulses. Heart sounds: Normal heart sounds, S1 normal and S2 normal. No murmur heard. No friction rub. No gallop. Pulmonary:      Effort: Pulmonary effort is normal. No respiratory distress. Breath sounds: Normal breath sounds. No wheezing, rhonchi or rales. Chest:      Chest wall: No tenderness. Breasts:      Right: No supraclavicular adenopathy. Left: No supraclavicular adenopathy.        Abdominal:      General: Bowel HbA1c < 7% (HCC)    4. Essential hypertension      Requested Prescriptions      No prescriptions requested or ordered in this encounter     Orders Placed This Encounter   Procedures    INFLUENZA, MDCK QUADV, 2 YRS AND OLDER, IM, PF, PREFILL SYR OR SDV, 0.5ML (FLUCELVAX QUADV, PF)       Patient giveneducational materials - see patient instructions. Discussed use, benefit, and side effects of prescribed medications. All patient questions answered. Pt voiced understanding. Reviewed health maintenance. Patient agreedwith treatment plan. Follow up as directed. Influenza vaccine updated in office today.   Acceptable risk for surgery Jayleen Memory FNP       Electronically signed by ARTURO Ramirez CNP on 12/27/2021 at 11:49 AM

## 2022-02-15 RX ORDER — SEMAGLUTIDE 1.34 MG/ML
INJECTION, SOLUTION SUBCUTANEOUS
Qty: 3 ML | Refills: 5 | Status: SHIPPED | OUTPATIENT
Start: 2022-02-15 | End: 2022-07-18

## 2022-02-23 ENCOUNTER — HOSPITAL ENCOUNTER (EMERGENCY)
Age: 67
Discharge: HOME | End: 2022-02-23
Attending: EMERGENCY MEDICINE
Payer: COMMERCIAL

## 2022-02-23 VITALS
RESPIRATION RATE: 18 BRPM | DIASTOLIC BLOOD PRESSURE: 74 MMHG | OXYGEN SATURATION: 95 % | SYSTOLIC BLOOD PRESSURE: 131 MMHG | WEIGHT: 175 LBS | HEART RATE: 98 BPM | TEMPERATURE: 98.5 F | BODY MASS INDEX: 23.73 KG/M2

## 2022-02-23 DIAGNOSIS — Z48.89 ENCOUNTER FOR POSTOPERATIVE WOUND CHECK: Primary | ICD-10-CM

## 2022-02-23 PROCEDURE — 99282 EMERGENCY DEPT VISIT SF MDM: CPT

## 2022-02-23 ASSESSMENT — PAIN - FUNCTIONAL ASSESSMENT: PAIN_FUNCTIONAL_ASSESSMENT: 0-10

## 2022-02-23 ASSESSMENT — PAIN SCALES - GENERAL: PAINLEVEL_OUTOF10: 2

## 2022-02-23 ASSESSMENT — PAIN DESCRIPTION - LOCATION: LOCATION: BACK

## 2022-02-23 ASSESSMENT — PAIN DESCRIPTION - PAIN TYPE: TYPE: ACUTE PAIN

## 2022-02-23 ASSESSMENT — PAIN DESCRIPTION - DESCRIPTORS: DESCRIPTORS: ACHING

## 2022-02-23 NOTE — ED TRIAGE NOTES
Patient arrived to ED with c/o post op problem. Patient stated Dr. Zachary Ho sent here to get CT scan done of increased swelling and drainage from back surgery.

## 2022-02-23 NOTE — ED PROVIDER NOTES
hernia repair (09/17/2012  Dr Mary Metzger); Abdomen surgery; and Tonsillectomy. CURRENT MEDICATIONS       Current Discharge Medication List      CONTINUE these medications which have NOT CHANGED    Details   OZEMPIC, 1 MG/DOSE, 4 MG/3ML SOPN INJECT 1 MG INTO THE SKIN ONCE A WEEK  Qty: 3 mL, Refills: 5      VORTIoxetine (TRINTELLIX) 10 MG TABS tablet take 1 tablet by mouth once daily  Qty: 90 tablet, Refills: 2      HYDROcodone-acetaminophen (NORCO) 5-325 MG per tablet Take 1 tablet by mouth every 6 hours as needed for Pain.       cyclobenzaprine (FLEXERIL) 5 MG tablet Take 5 mg by mouth 3 times daily as needed for Muscle spasms      benazepril (LOTENSIN) 40 MG tablet TAKE 1 TABLET BY MOUTH DAILY  Qty: 90 tablet, Refills: 3      amLODIPine (NORVASC) 10 MG tablet TAKE 1 TABLET BY MOUTH DAILY  Qty: 90 tablet, Refills: 3      JANUVIA 100 MG tablet TAKE 1 TABLET BY MOUTH EVERY DAY  Qty: 90 tablet, Refills: 3      aspirin 325 MG EC tablet take 1 tablet by mouth once daily  Qty: 90 tablet, Refills: 3      Blood Glucose Monitoring Suppl (ACCU-CHEK GUIDE) w/Device KIT CHECK BLOOD SUGAR DAILY      triamcinolone (NASACORT ALLERGY 24HR) 55 MCG/ACT nasal inhaler 2 sprays by Each Nostril route daily  Qty: 1 Inhaler, Refills: 2      !! TRUEplus Lancets 33G MISC USE TWICE A DAY  Qty: 100 each, Refills: 3      !! blood glucose test strips (ACCU-CHEK SMARTVIEW) strip CHECK BLOOD SUGAR TWICE A DAY AS NEEDED FOR SYMPTOMS OF IRREGULAR BLOOD GLUCOSE  Qty: 100 strip, Refills: 3    Comments: Accu-Check Smartview      Apple Sudhir Vn-Grn Tea-Bit Or-Cr (APPLE CIDER VINEGAR PLUS) TABS Take by mouth      !! ACCU-CHEK SMARTVIEW strip TEST BLOOD SUGAR ONCE A DAY  Qty: 50 strip, Refills: 11      Cholecalciferol (VITAMIN D3) 2000 units CAPS Take by mouth      latanoprost (XALATAN) 0.005 % ophthalmic solution Place 1 drop into both eyes daily       Esomeprazole Magnesium (NEXIUM 24HR PO) Take by mouth      OLIVE LEAF PO Take by mouth NONFORMULARY daily PROSTATE SUPPORT      multivitamin (THERAGRAN) per tablet Take 1 tablet by mouth daily. !! Lancets MISC Use qd  Dx:  250.02  Qty: 50 each, Refills: 11      cetirizine (ZYRTEC) 10 MG tablet Take 10 mg by mouth daily. !! - Potential duplicate medications found. Please discuss with provider. ALLERGIES       has No Known Allergies. FAMILY HISTORY       He indicated that his mother is alive. He indicated that his father is . He indicated that his sister is alive. He indicated that only one of his two brothers is alive. family history includes Diabetes in his brother and father; Stroke in his brother. SOCIAL HISTORY        reports that he has been smoking e-cigarettes. He has a 20.00 pack-year smoking history. He has never used smokeless tobacco. He reports current alcohol use. He reports that he does not use drugs. PHYSICAL EXAM       INITIAL VITALS:  weight is 175 lb (79.4 kg). His oral temperature is 98.5 °F (36.9 °C). His blood pressure is 144/77 (abnormal) and his pulse is 98. His respiration is 18 and oxygen saturation is 100%. Physical Exam  Vitals and nursing note reviewed. Constitutional:       General: He is not in acute distress. Appearance: Normal appearance. Cardiovascular:      Rate and Rhythm: Normal rate and regular rhythm. Heart sounds: Normal heart sounds. No murmur heard. Pulmonary:      Effort: Pulmonary effort is normal.      Breath sounds: Normal breath sounds. Musculoskeletal:      Comments: Patient has a midline lumbar postop wound. No evidence for dehiscence. No erythema. No induration. Appears clean, dry, and appropriately healing. Skin:     General: Skin is warm and dry. Capillary Refill: Capillary refill takes less than 2 seconds. Neurological:      General: No focal deficit present. Mental Status: He is alert.    Psychiatric:         Mood and Affect: Mood normal.         Behavior: Behavior normal.       DIAGNOSTIC RESULTS     Not indicated      EMERGENCY DEPARTMENT COURSE:     Vitals:    Vitals:    02/23/22 1314   BP: (!) 144/77   Pulse: 98   Resp: 18   Temp: 98.5 °F (36.9 °C)   TempSrc: Oral   SpO2: 100%   Weight: 175 lb (79.4 kg)     -------------------------  BP: (!) 144/77, Temp: 98.5 °F (36.9 °C), Pulse: 98, Resp: 18      CONSULTS:  Ortho      MEDICAL DECISION MAKING:     Patient is here for a postop wound check  His wound appears clean, dry, and appropriately healing  He has been seen by SHAR Dutta with the orthopedic service  Agrees with plan of care for discharge home with outpatient follow up next week as previously scheduled  Stable for outpatient follow-up      FINAL IMPRESSION      1. Encounter for postoperative wound check          DISPOSITION/PLAN     Discharge home      PATIENT REFERRED TO:  Antoni Vizcarra, ARTURO - CNP  [address]   [phone]    Call       Ivan Plascencia MD  [address] [place] [address]  [phone]    Call       (Please note that portions of this note were completed with a voice recognition program.  Efforts were made to edit the dictations but occasionally words are mis-transcribed.)    Stephen hSeikh.  DO Alvaro  Attending Emergency Physician        Delmi Tristan DO  02/23/22 0780

## 2022-05-05 RX ORDER — EMPAGLIFLOZIN 25 MG/1
TABLET, FILM COATED ORAL
Qty: 30 TABLET | Refills: 2 | Status: SHIPPED | OUTPATIENT
Start: 2022-05-05 | End: 2022-08-01

## 2022-05-17 ENCOUNTER — APPOINTMENT (OUTPATIENT)
Dept: CT IMAGING | Age: 67
DRG: 309 | End: 2022-05-17
Payer: COMMERCIAL

## 2022-05-17 ENCOUNTER — APPOINTMENT (OUTPATIENT)
Dept: GENERAL RADIOLOGY | Age: 67
DRG: 309 | End: 2022-05-17
Payer: COMMERCIAL

## 2022-05-17 ENCOUNTER — HOSPITAL ENCOUNTER (INPATIENT)
Age: 67
LOS: 1 days | Discharge: HOME | DRG: 309 | End: 2022-05-19
Attending: EMERGENCY MEDICINE | Admitting: FAMILY MEDICINE
Payer: COMMERCIAL

## 2022-05-17 DIAGNOSIS — I48.91 ATRIAL FIBRILLATION WITH RVR (HCC): Primary | ICD-10-CM

## 2022-05-17 LAB
ANION GAP SERPL CALCULATED.3IONS-SCNC: 16 MEQ/L (ref 8–16)
BASOPHILS # BLD: 1.2 %
BASOPHILS ABSOLUTE: 0.1 THOU/MM3 (ref 0–0.1)
BUN BLDV-MCNC: 11 MG/DL (ref 7–22)
CALCIUM SERPL-MCNC: 10.1 MG/DL (ref 8.5–10.5)
CHLORIDE BLD-SCNC: 105 MEQ/L (ref 98–111)
CO2: 24 MEQ/L (ref 23–33)
CREAT SERPL-MCNC: 1 MG/DL (ref 0.4–1.2)
D-DIMER QUANTITATIVE: 889 NG/ML FEU (ref 0–500)
EOSINOPHIL # BLD: 1.1 %
EOSINOPHILS ABSOLUTE: 0.1 THOU/MM3 (ref 0–0.4)
ERYTHROCYTE [DISTWIDTH] IN BLOOD BY AUTOMATED COUNT: 13 % (ref 11.5–14.5)
ERYTHROCYTE [DISTWIDTH] IN BLOOD BY AUTOMATED COUNT: 44.3 FL (ref 35–45)
GFR SERPL CREATININE-BSD FRML MDRD: 75 ML/MIN/1.73M2
GLUCOSE BLD-MCNC: 164 MG/DL (ref 70–108)
HCT VFR BLD CALC: 52.2 % (ref 42–52)
HEMOGLOBIN: 17.4 GM/DL (ref 14–18)
IMMATURE GRANS (ABS): 0.04 THOU/MM3 (ref 0–0.07)
IMMATURE GRANULOCYTES: 0.3 %
LYMPHOCYTES # BLD: 28.2 %
LYMPHOCYTES ABSOLUTE: 3.3 THOU/MM3 (ref 1–4.8)
MAGNESIUM: 2.3 MG/DL (ref 1.6–2.4)
MCH RBC QN AUTO: 31 PG (ref 26–33)
MCHC RBC AUTO-ENTMCNC: 33.3 GM/DL (ref 32.2–35.5)
MCV RBC AUTO: 93 FL (ref 80–94)
MONOCYTES # BLD: 10 %
MONOCYTES ABSOLUTE: 1.2 THOU/MM3 (ref 0.4–1.3)
NUCLEATED RED BLOOD CELLS: 0 /100 WBC
OSMOLALITY CALCULATION: 291.7 MOSMOL/KG (ref 275–300)
PLATELET # BLD: 280 THOU/MM3 (ref 130–400)
PMV BLD AUTO: 9.6 FL (ref 9.4–12.4)
POTASSIUM REFLEX MAGNESIUM: 4.6 MEQ/L (ref 3.5–5.2)
PRO-BNP: 36.1 PG/ML (ref 0–900)
RBC # BLD: 5.61 MILL/MM3 (ref 4.7–6.1)
SEG NEUTROPHILS: 59.2 %
SEGMENTED NEUTROPHILS ABSOLUTE COUNT: 6.9 THOU/MM3 (ref 1.8–7.7)
SODIUM BLD-SCNC: 145 MEQ/L (ref 135–145)
TROPONIN T: < 0.01 NG/ML
TSH SERPL DL<=0.05 MIU/L-ACNC: 3.38 UIU/ML (ref 0.4–4.2)
WBC # BLD: 11.6 THOU/MM3 (ref 4.8–10.8)

## 2022-05-17 PROCEDURE — 80048 BASIC METABOLIC PNL TOTAL CA: CPT

## 2022-05-17 PROCEDURE — 71275 CT ANGIOGRAPHY CHEST: CPT

## 2022-05-17 PROCEDURE — 96361 HYDRATE IV INFUSION ADD-ON: CPT

## 2022-05-17 PROCEDURE — 2580000003 HC RX 258: Performed by: EMERGENCY MEDICINE

## 2022-05-17 PROCEDURE — 83880 ASSAY OF NATRIURETIC PEPTIDE: CPT

## 2022-05-17 PROCEDURE — 84443 ASSAY THYROID STIM HORMONE: CPT

## 2022-05-17 PROCEDURE — 85610 PROTHROMBIN TIME: CPT

## 2022-05-17 PROCEDURE — 2500000003 HC RX 250 WO HCPCS

## 2022-05-17 PROCEDURE — 83735 ASSAY OF MAGNESIUM: CPT

## 2022-05-17 PROCEDURE — 85025 COMPLETE CBC W/AUTO DIFF WBC: CPT

## 2022-05-17 PROCEDURE — 99285 EMERGENCY DEPT VISIT HI MDM: CPT

## 2022-05-17 PROCEDURE — 71045 X-RAY EXAM CHEST 1 VIEW: CPT

## 2022-05-17 PROCEDURE — 96374 THER/PROPH/DIAG INJ IV PUSH: CPT

## 2022-05-17 PROCEDURE — 85730 THROMBOPLASTIN TIME PARTIAL: CPT

## 2022-05-17 PROCEDURE — 85379 FIBRIN DEGRADATION QUANT: CPT

## 2022-05-17 PROCEDURE — 84484 ASSAY OF TROPONIN QUANT: CPT

## 2022-05-17 PROCEDURE — 6360000004 HC RX CONTRAST MEDICATION: Performed by: EMERGENCY MEDICINE

## 2022-05-17 PROCEDURE — 93005 ELECTROCARDIOGRAM TRACING: CPT | Performed by: EMERGENCY MEDICINE

## 2022-05-17 RX ORDER — HEPARIN SODIUM 1000 [USP'U]/ML
2000 INJECTION, SOLUTION INTRAVENOUS; SUBCUTANEOUS PRN
Status: DISCONTINUED | OUTPATIENT
Start: 2022-05-17 | End: 2022-05-19 | Stop reason: HOSPADM

## 2022-05-17 RX ORDER — HEPARIN SODIUM 1000 [USP'U]/ML
4000 INJECTION, SOLUTION INTRAVENOUS; SUBCUTANEOUS PRN
Status: DISCONTINUED | OUTPATIENT
Start: 2022-05-17 | End: 2022-05-19 | Stop reason: HOSPADM

## 2022-05-17 RX ORDER — METOPROLOL TARTRATE 5 MG/5ML
5 INJECTION INTRAVENOUS ONCE
Status: COMPLETED | OUTPATIENT
Start: 2022-05-17 | End: 2022-05-17

## 2022-05-17 RX ORDER — DILTIAZEM HYDROCHLORIDE 5 MG/ML
20 INJECTION INTRAVENOUS ONCE
Status: COMPLETED | OUTPATIENT
Start: 2022-05-18 | End: 2022-05-18

## 2022-05-17 RX ORDER — DILTIAZEM HYDROCHLORIDE 5 MG/ML
10 INJECTION INTRAVENOUS ONCE
Status: DISCONTINUED | OUTPATIENT
Start: 2022-05-17 | End: 2022-05-17

## 2022-05-17 RX ORDER — HEPARIN SODIUM 10000 [USP'U]/100ML
5-30 INJECTION, SOLUTION INTRAVENOUS CONTINUOUS
Status: DISPENSED | OUTPATIENT
Start: 2022-05-17 | End: 2022-05-19

## 2022-05-17 RX ORDER — METOPROLOL TARTRATE 5 MG/5ML
INJECTION INTRAVENOUS
Status: COMPLETED
Start: 2022-05-17 | End: 2022-05-17

## 2022-05-17 RX ORDER — 0.9 % SODIUM CHLORIDE 0.9 %
1000 INTRAVENOUS SOLUTION INTRAVENOUS ONCE
Status: COMPLETED | OUTPATIENT
Start: 2022-05-17 | End: 2022-05-18

## 2022-05-17 RX ORDER — HEPARIN SODIUM 1000 [USP'U]/ML
4000 INJECTION, SOLUTION INTRAVENOUS; SUBCUTANEOUS ONCE
Status: COMPLETED | OUTPATIENT
Start: 2022-05-17 | End: 2022-05-18

## 2022-05-17 RX ADMIN — IOPAMIDOL 80 ML: 755 INJECTION, SOLUTION INTRAVENOUS at 23:35

## 2022-05-17 RX ADMIN — SODIUM CHLORIDE 1000 ML: 9 INJECTION, SOLUTION INTRAVENOUS at 22:48

## 2022-05-17 RX ADMIN — METOPROLOL TARTRATE 5 MG: 1 INJECTION, SOLUTION INTRAVENOUS at 23:04

## 2022-05-17 RX ADMIN — METOPROLOL TARTRATE 5 MG: 5 INJECTION INTRAVENOUS at 23:04

## 2022-05-17 ASSESSMENT — PAIN DESCRIPTION - LOCATION: LOCATION: CHEST

## 2022-05-17 ASSESSMENT — PAIN - FUNCTIONAL ASSESSMENT: PAIN_FUNCTIONAL_ASSESSMENT: 0-10

## 2022-05-17 ASSESSMENT — PAIN SCALES - GENERAL
PAINLEVEL_OUTOF10: 0
PAINLEVEL_OUTOF10: 4

## 2022-05-17 ASSESSMENT — PAIN DESCRIPTION - PAIN TYPE: TYPE: ACUTE PAIN

## 2022-05-17 ASSESSMENT — PAIN DESCRIPTION - DESCRIPTORS: DESCRIPTORS: PRESSURE

## 2022-05-18 PROBLEM — I48.91 NEW ONSET ATRIAL FIBRILLATION (HCC): Status: ACTIVE | Noted: 2022-05-18

## 2022-05-18 LAB
AMPHETAMINE+METHAMPHETAMINE URINE SCREEN: NEGATIVE
ANION GAP SERPL CALCULATED.3IONS-SCNC: 12 MEQ/L (ref 8–16)
APTT: 31.2 SECONDS (ref 22–38)
BARBITURATE QUANTITATIVE URINE: NEGATIVE
BENZODIAZEPINE QUANTITATIVE URINE: NEGATIVE
BUN BLDV-MCNC: 9 MG/DL (ref 7–22)
CALCIUM SERPL-MCNC: 8.3 MG/DL (ref 8.5–10.5)
CANNABINOID QUANTITATIVE URINE: NEGATIVE
CHLORIDE BLD-SCNC: 106 MEQ/L (ref 98–111)
CO2: 22 MEQ/L (ref 23–33)
COCAINE METABOLITE QUANTITATIVE URINE: NEGATIVE
CREAT SERPL-MCNC: 0.6 MG/DL (ref 0.4–1.2)
EKG ATRIAL RATE: 122 BPM
EKG ATRIAL RATE: 57 BPM
EKG P AXIS: 71 DEGREES
EKG P AXIS: 77 DEGREES
EKG P-R INTERVAL: 140 MS
EKG P-R INTERVAL: 150 MS
EKG Q-T INTERVAL: 290 MS
EKG Q-T INTERVAL: 306 MS
EKG Q-T INTERVAL: 408 MS
EKG QRS DURATION: 78 MS
EKG QRS DURATION: 80 MS
EKG QRS DURATION: 80 MS
EKG QTC CALCULATION (BAZETT): 397 MS
EKG QTC CALCULATION (BAZETT): 413 MS
EKG QTC CALCULATION (BAZETT): 481 MS
EKG R AXIS: 81 DEGREES
EKG R AXIS: 85 DEGREES
EKG R AXIS: 88 DEGREES
EKG T AXIS: -9 DEGREES
EKG T AXIS: 42 DEGREES
EKG T AXIS: 63 DEGREES
EKG VENTRICULAR RATE: 122 BPM
EKG VENTRICULAR RATE: 149 BPM
EKG VENTRICULAR RATE: 57 BPM
ERYTHROCYTE [DISTWIDTH] IN BLOOD BY AUTOMATED COUNT: 13.1 % (ref 11.5–14.5)
ERYTHROCYTE [DISTWIDTH] IN BLOOD BY AUTOMATED COUNT: 46.2 FL (ref 35–45)
GFR SERPL CREATININE-BSD FRML MDRD: > 90 ML/MIN/1.73M2
GLUCOSE BLD-MCNC: 128 MG/DL (ref 70–108)
GLUCOSE BLD-MCNC: 143 MG/DL (ref 70–108)
GLUCOSE BLD-MCNC: 154 MG/DL (ref 70–108)
GLUCOSE BLD-MCNC: 158 MG/DL (ref 70–108)
GLUCOSE BLD-MCNC: 181 MG/DL (ref 70–108)
HCT VFR BLD CALC: 46.3 % (ref 42–52)
HEMOGLOBIN: 15.3 GM/DL (ref 14–18)
HEPARIN UNFRACTIONATED: 0.19 U/ML (ref 0.3–0.7)
HEPARIN UNFRACTIONATED: 0.23 U/ML (ref 0.3–0.7)
INR BLD: 0.92 (ref 0.85–1.13)
MAGNESIUM: 1.9 MG/DL (ref 1.6–2.4)
MCH RBC QN AUTO: 31.5 PG (ref 26–33)
MCHC RBC AUTO-ENTMCNC: 33 GM/DL (ref 32.2–35.5)
MCV RBC AUTO: 95.3 FL (ref 80–94)
OPIATES, URINE: NEGATIVE
OSMOLALITY CALCULATION: 281.2 MOSMOL/KG (ref 275–300)
OXYCODONE: NEGATIVE
PHENCYCLIDINE QUANTITATIVE URINE: NEGATIVE
PLATELET # BLD: 219 THOU/MM3 (ref 130–400)
PMV BLD AUTO: 9.2 FL (ref 9.4–12.4)
POTASSIUM SERPL-SCNC: 4 MEQ/L (ref 3.5–5.2)
RBC # BLD: 4.86 MILL/MM3 (ref 4.7–6.1)
REASON FOR REJECTION: NORMAL
REJECTED TEST: NORMAL
SARS-COV-2, NAAT: NOT  DETECTED
SODIUM BLD-SCNC: 140 MEQ/L (ref 135–145)
WBC # BLD: 9 THOU/MM3 (ref 4.8–10.8)

## 2022-05-18 PROCEDURE — 2500000003 HC RX 250 WO HCPCS: Performed by: EMERGENCY MEDICINE

## 2022-05-18 PROCEDURE — 80048 BASIC METABOLIC PNL TOTAL CA: CPT

## 2022-05-18 PROCEDURE — 80307 DRUG TEST PRSMV CHEM ANLYZR: CPT

## 2022-05-18 PROCEDURE — 6360000002 HC RX W HCPCS: Performed by: EMERGENCY MEDICINE

## 2022-05-18 PROCEDURE — 99232 SBSQ HOSP IP/OBS MODERATE 35: CPT | Performed by: NURSE PRACTITIONER

## 2022-05-18 PROCEDURE — 93010 ELECTROCARDIOGRAM REPORT: CPT | Performed by: INTERNAL MEDICINE

## 2022-05-18 PROCEDURE — 6370000000 HC RX 637 (ALT 250 FOR IP): Performed by: INTERNAL MEDICINE

## 2022-05-18 PROCEDURE — 6370000000 HC RX 637 (ALT 250 FOR IP): Performed by: STUDENT IN AN ORGANIZED HEALTH CARE EDUCATION/TRAINING PROGRAM

## 2022-05-18 PROCEDURE — 85027 COMPLETE CBC AUTOMATED: CPT

## 2022-05-18 PROCEDURE — 99222 1ST HOSP IP/OBS MODERATE 55: CPT | Performed by: FAMILY MEDICINE

## 2022-05-18 PROCEDURE — 82948 REAGENT STRIP/BLOOD GLUCOSE: CPT

## 2022-05-18 PROCEDURE — 99223 1ST HOSP IP/OBS HIGH 75: CPT | Performed by: INTERNAL MEDICINE

## 2022-05-18 PROCEDURE — 96375 TX/PRO/DX INJ NEW DRUG ADDON: CPT

## 2022-05-18 PROCEDURE — 1200000003 HC TELEMETRY R&B

## 2022-05-18 PROCEDURE — 36415 COLL VENOUS BLD VENIPUNCTURE: CPT

## 2022-05-18 PROCEDURE — 87635 SARS-COV-2 COVID-19 AMP PRB: CPT

## 2022-05-18 PROCEDURE — 2580000003 HC RX 258: Performed by: STUDENT IN AN ORGANIZED HEALTH CARE EDUCATION/TRAINING PROGRAM

## 2022-05-18 PROCEDURE — 2500000003 HC RX 250 WO HCPCS: Performed by: INTERNAL MEDICINE

## 2022-05-18 PROCEDURE — 93005 ELECTROCARDIOGRAM TRACING: CPT | Performed by: INTERNAL MEDICINE

## 2022-05-18 PROCEDURE — 85520 HEPARIN ASSAY: CPT

## 2022-05-18 PROCEDURE — 83735 ASSAY OF MAGNESIUM: CPT

## 2022-05-18 RX ORDER — POLYETHYLENE GLYCOL 3350 17 G/17G
17 POWDER, FOR SOLUTION ORAL DAILY PRN
Status: DISCONTINUED | OUTPATIENT
Start: 2022-05-18 | End: 2022-05-19 | Stop reason: HOSPADM

## 2022-05-18 RX ORDER — SODIUM CHLORIDE 9 MG/ML
INJECTION, SOLUTION INTRAVENOUS PRN
Status: CANCELLED | OUTPATIENT
Start: 2022-05-18

## 2022-05-18 RX ORDER — METOPROLOL TARTRATE 50 MG/1
50 TABLET, FILM COATED ORAL 2 TIMES DAILY
Status: DISCONTINUED | OUTPATIENT
Start: 2022-05-18 | End: 2022-05-18

## 2022-05-18 RX ORDER — SODIUM CHLORIDE 9 MG/ML
INJECTION, SOLUTION INTRAVENOUS CONTINUOUS
Status: DISCONTINUED | OUTPATIENT
Start: 2022-05-18 | End: 2022-05-19

## 2022-05-18 RX ORDER — LISINOPRIL 40 MG/1
40 TABLET ORAL DAILY
Status: DISCONTINUED | OUTPATIENT
Start: 2022-05-18 | End: 2022-05-19 | Stop reason: HOSPADM

## 2022-05-18 RX ORDER — ACETAMINOPHEN 650 MG/1
650 SUPPOSITORY RECTAL EVERY 6 HOURS PRN
Status: DISCONTINUED | OUTPATIENT
Start: 2022-05-18 | End: 2022-05-19 | Stop reason: HOSPADM

## 2022-05-18 RX ORDER — CYCLOBENZAPRINE HCL 10 MG
5 TABLET ORAL 3 TIMES DAILY PRN
Status: DISCONTINUED | OUTPATIENT
Start: 2022-05-18 | End: 2022-05-19 | Stop reason: HOSPADM

## 2022-05-18 RX ORDER — AMLODIPINE BESYLATE 10 MG/1
10 TABLET ORAL DAILY
Status: DISCONTINUED | OUTPATIENT
Start: 2022-05-18 | End: 2022-05-18

## 2022-05-18 RX ORDER — ONDANSETRON 4 MG/1
4 TABLET, ORALLY DISINTEGRATING ORAL EVERY 8 HOURS PRN
Status: DISCONTINUED | OUTPATIENT
Start: 2022-05-18 | End: 2022-05-19 | Stop reason: HOSPADM

## 2022-05-18 RX ORDER — INSULIN LISPRO 100 [IU]/ML
0-6 INJECTION, SOLUTION INTRAVENOUS; SUBCUTANEOUS
Status: DISCONTINUED | OUTPATIENT
Start: 2022-05-18 | End: 2022-05-19 | Stop reason: HOSPADM

## 2022-05-18 RX ORDER — HYDROCODONE BITARTRATE AND ACETAMINOPHEN 5; 325 MG/1; MG/1
1 TABLET ORAL EVERY 6 HOURS PRN
Status: DISCONTINUED | OUTPATIENT
Start: 2022-05-18 | End: 2022-05-19 | Stop reason: HOSPADM

## 2022-05-18 RX ORDER — SODIUM CHLORIDE 0.9 % (FLUSH) 0.9 %
10 SYRINGE (ML) INJECTION PRN
Status: DISCONTINUED | OUTPATIENT
Start: 2022-05-18 | End: 2022-05-19 | Stop reason: HOSPADM

## 2022-05-18 RX ORDER — SODIUM CHLORIDE 0.9 % (FLUSH) 0.9 %
5-40 SYRINGE (ML) INJECTION EVERY 12 HOURS SCHEDULED
Status: CANCELLED | OUTPATIENT
Start: 2022-05-18

## 2022-05-18 RX ORDER — AMLODIPINE BESYLATE 10 MG/1
1 TABLET ORAL DAILY
Status: CANCELLED | OUTPATIENT
Start: 2022-05-18

## 2022-05-18 RX ORDER — ACETAMINOPHEN 325 MG/1
650 TABLET ORAL EVERY 6 HOURS PRN
Status: DISCONTINUED | OUTPATIENT
Start: 2022-05-18 | End: 2022-05-19 | Stop reason: HOSPADM

## 2022-05-18 RX ORDER — DILTIAZEM HYDROCHLORIDE 120 MG/1
120 CAPSULE, COATED, EXTENDED RELEASE ORAL DAILY
Status: DISCONTINUED | OUTPATIENT
Start: 2022-05-18 | End: 2022-05-18

## 2022-05-18 RX ORDER — ONDANSETRON 2 MG/ML
4 INJECTION INTRAMUSCULAR; INTRAVENOUS EVERY 6 HOURS PRN
Status: DISCONTINUED | OUTPATIENT
Start: 2022-05-18 | End: 2022-05-19 | Stop reason: HOSPADM

## 2022-05-18 RX ORDER — DEXTROSE MONOHYDRATE 50 MG/ML
100 INJECTION, SOLUTION INTRAVENOUS PRN
Status: DISCONTINUED | OUTPATIENT
Start: 2022-05-18 | End: 2022-05-19 | Stop reason: HOSPADM

## 2022-05-18 RX ORDER — ESOMEPRAZOLE MAGNESIUM 20 MG/1
20 TABLET, DELAYED RELEASE ORAL DAILY
Status: DISCONTINUED | OUTPATIENT
Start: 2022-05-18 | End: 2022-05-18 | Stop reason: CLARIF

## 2022-05-18 RX ORDER — SODIUM CHLORIDE 0.9 % (FLUSH) 0.9 %
10 SYRINGE (ML) INJECTION EVERY 12 HOURS SCHEDULED
Status: DISCONTINUED | OUTPATIENT
Start: 2022-05-18 | End: 2022-05-19 | Stop reason: HOSPADM

## 2022-05-18 RX ORDER — INSULIN LISPRO 100 [IU]/ML
0-3 INJECTION, SOLUTION INTRAVENOUS; SUBCUTANEOUS NIGHTLY
Status: DISCONTINUED | OUTPATIENT
Start: 2022-05-18 | End: 2022-05-19 | Stop reason: HOSPADM

## 2022-05-18 RX ORDER — CETIRIZINE HYDROCHLORIDE 10 MG/1
10 TABLET ORAL DAILY
Status: DISCONTINUED | OUTPATIENT
Start: 2022-05-18 | End: 2022-05-19 | Stop reason: HOSPADM

## 2022-05-18 RX ORDER — SODIUM CHLORIDE 0.9 % (FLUSH) 0.9 %
5-40 SYRINGE (ML) INJECTION PRN
Status: CANCELLED | OUTPATIENT
Start: 2022-05-18

## 2022-05-18 RX ORDER — SODIUM CHLORIDE 9 MG/ML
INJECTION, SOLUTION INTRAVENOUS PRN
Status: DISCONTINUED | OUTPATIENT
Start: 2022-05-18 | End: 2022-05-19 | Stop reason: HOSPADM

## 2022-05-18 RX ORDER — PANTOPRAZOLE SODIUM 40 MG/1
40 TABLET, DELAYED RELEASE ORAL
Status: DISCONTINUED | OUTPATIENT
Start: 2022-05-18 | End: 2022-05-19 | Stop reason: HOSPADM

## 2022-05-18 RX ADMIN — PANTOPRAZOLE SODIUM 40 MG: 40 TABLET, DELAYED RELEASE ORAL at 08:14

## 2022-05-18 RX ADMIN — METOPROLOL TARTRATE 75 MG: 50 TABLET, FILM COATED ORAL at 21:02

## 2022-05-18 RX ADMIN — HEPARIN SODIUM 2000 UNITS: 1000 INJECTION, SOLUTION INTRAVENOUS; SUBCUTANEOUS at 08:06

## 2022-05-18 RX ADMIN — AMIODARONE HYDROCHLORIDE 150 MG: 1.5 INJECTION, SOLUTION INTRAVENOUS at 10:37

## 2022-05-18 RX ADMIN — HEPARIN SODIUM 4000 UNITS: 1000 INJECTION, SOLUTION INTRAVENOUS; SUBCUTANEOUS at 00:10

## 2022-05-18 RX ADMIN — AMIODARONE HYDROCHLORIDE 0.5 MG/MIN: 1.8 INJECTION, SOLUTION INTRAVENOUS at 16:31

## 2022-05-18 RX ADMIN — HEPARIN SODIUM 12 UNITS/KG/HR: 10000 INJECTION, SOLUTION INTRAVENOUS at 00:19

## 2022-05-18 RX ADMIN — SODIUM CHLORIDE: 9 INJECTION, SOLUTION INTRAVENOUS at 05:00

## 2022-05-18 RX ADMIN — HEPARIN SODIUM 14 UNITS/KG/HR: 10000 INJECTION, SOLUTION INTRAVENOUS at 08:06

## 2022-05-18 RX ADMIN — HEPARIN SODIUM 16 UNITS/KG/HR: 10000 INJECTION, SOLUTION INTRAVENOUS at 23:31

## 2022-05-18 RX ADMIN — DILTIAZEM HYDROCHLORIDE 20 MG: 5 INJECTION INTRAVENOUS at 00:04

## 2022-05-18 RX ADMIN — AMIODARONE HYDROCHLORIDE 1 MG/MIN: 1.8 INJECTION, SOLUTION INTRAVENOUS at 10:53

## 2022-05-18 RX ADMIN — LISINOPRIL 40 MG: 40 TABLET ORAL at 08:23

## 2022-05-18 RX ADMIN — ASPIRIN 325 MG: 325 TABLET, COATED ORAL at 08:14

## 2022-05-18 RX ADMIN — VORTIOXETINE 10 MG: 10 TABLET, FILM COATED ORAL at 08:23

## 2022-05-18 RX ADMIN — HEPARIN SODIUM 2000 UNITS: 1000 INJECTION, SOLUTION INTRAVENOUS; SUBCUTANEOUS at 17:42

## 2022-05-18 RX ADMIN — METOPROLOL TARTRATE 50 MG: 50 TABLET, FILM COATED ORAL at 10:33

## 2022-05-18 RX ADMIN — CETIRIZINE HYDROCHLORIDE 10 MG: 10 TABLET, FILM COATED ORAL at 10:33

## 2022-05-18 RX ADMIN — HEPARIN SODIUM 16 UNITS/KG/HR: 10000 INJECTION, SOLUTION INTRAVENOUS at 17:42

## 2022-05-18 ASSESSMENT — PAIN DESCRIPTION - PAIN TYPE: TYPE: CHRONIC PAIN

## 2022-05-18 ASSESSMENT — PAIN DESCRIPTION - ONSET: ONSET: ON-GOING

## 2022-05-18 ASSESSMENT — PAIN SCALES - GENERAL
PAINLEVEL_OUTOF10: 0
PAINLEVEL_OUTOF10: 3
PAINLEVEL_OUTOF10: 1
PAINLEVEL_OUTOF10: 0

## 2022-05-18 ASSESSMENT — PAIN DESCRIPTION - ORIENTATION: ORIENTATION: RIGHT

## 2022-05-18 ASSESSMENT — PAIN DESCRIPTION - FREQUENCY: FREQUENCY: CONTINUOUS

## 2022-05-18 ASSESSMENT — PAIN - FUNCTIONAL ASSESSMENT
PAIN_FUNCTIONAL_ASSESSMENT: PREVENTS OR INTERFERES SOME ACTIVE ACTIVITIES AND ADLS
PAIN_FUNCTIONAL_ASSESSMENT: NONE - DENIES PAIN

## 2022-05-18 ASSESSMENT — LIFESTYLE VARIABLES: HOW OFTEN DO YOU HAVE A DRINK CONTAINING ALCOHOL: NEVER

## 2022-05-18 ASSESSMENT — PAIN DESCRIPTION - DESCRIPTORS: DESCRIPTORS: ACHING

## 2022-05-18 ASSESSMENT — PAIN DESCRIPTION - LOCATION: LOCATION: SHOULDER

## 2022-05-18 NOTE — ED PROVIDER NOTES
[facility]  EMERGENCY DEPARTMENT ENCOUNTER      PATIENT NAME: Shira Carrera  MRN: [id number]  : 1955  MCCANN: 2022  PROVIDER: Leoncio Herbert MD      [address] [place] [address]       Chief Complaint   Patient presents with    Dizziness    Tachycardia       Patient is seen and evaluated in a timely fashion. Nurses Notes are reviewed and I agree except as noted in the HPI. HISTORY OF PRESENT ILLNESS    Bruce Cooper is a 61 y.o. male who presents to Emergency Department with Dizziness and Tachycardia     Patient presents to ED for evaluation of sudden onset palpitation and rapid heart rate. Squad reports heart rate was 167/min. Patient states that he was working in the yard tonight when he suddenly felt something was not right. Patient felt chest pressure and dizziness then. BP was 80/40 per family. On arrival, patient was still tachycardic with ventricular rate around 120-160/min, rhythm varied among atrial flutter with RVR, atrial fibrilation with RVR, and sinus tachycardia. Patient complaints mild retrosternal pressure, no chest pain. 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 He is taking aspirin 324 mg daily. Lumbar fusion in 2022. This HPI was provided by patient.     REVIEW OF SYSTEMS   Ten-point review of systems is negative except those documented in above HPI including constitutional, HEENT, respiratory, cardiovascular, gastrointestinal, genitourinary, musculoskeletal, skin, neurological, hematological and behavioral.      PAST MEDICAL HISTORY     Past Medical History:   Diagnosis Date    BPH (benign prostatic hyperplasia)     Depression     [facility] mellitus ([facility]     Factor 5 Leiden mutation, heterozygous ([facility]     Heterozygous MTHFR mutation [id number]     Hyperglycemia     Hypertension     Hypertension     Osteoarthritis MG/DOSE, 4 MG/3ML SOPN    INJECT 1 MG INTO THE SKIN ONCE A WEEK    TRIAMCINOLONE (NASACORT ALLERGY 24HR) 55 MCG/ACT NASAL INHALER    2 sprays by Each Nostril route daily    TRUEPLUS LANCETS 33G MISC    USE TWICE A DAY    VORTIOXETINE (TRINTELLIX) 10 MG TABS TABLET    take 1 tablet by mouth once daily       ALLERGIES     Patient has no known allergies. FAMILY HISTORY     He indicated that his mother is alive. He indicated that his father is . He indicated that his sister is alive. He indicated that only one of his two brothers is alive. family history includes Diabetes in his brother and father; Stroke in his brother. SOCIAL HISTORY      reports that he has been smoking e-cigarettes. He has a 20.00 pack-year smoking history. He has never used smokeless tobacco. He reports current alcohol use. He reports that he does not use drugs. PHYSICAL EXAM      weight is 175 lb (79.4 kg). His oral temperature is 98.7 °F (37.1 °C). His blood pressure is 115/82 and his pulse is 109. His respiration is 24 and oxygen saturation is 96%. Physical Exam  Vitals and nursing note reviewed. Constitutional:       Appearance: He is well-developed. He is not diaphoretic. HENT:      Head: Normocephalic and atraumatic. Nose: Nose normal.   Eyes:      General: No scleral icterus. Right eye: No discharge. Left eye: No discharge. Conjunctiva/sclera: Conjunctivae normal.      Pupils: Pupils are equal, round, and reactive to light. Neck:      Vascular: No JVD. Trachea: No tracheal deviation. Cardiovascular:      Rate and Rhythm: Tachycardia present. Rhythm irregular. Heart sounds: Normal heart sounds. No murmur heard. No friction rub. No gallop. Pulmonary:      Effort: Pulmonary effort is normal. No respiratory distress. Breath sounds: Normal breath sounds. No stridor. No wheezing or rales. Chest:      Chest wall: No tenderness.    Abdominal:      General: Bowel sounds are normal. There is no distension. Palpations: Abdomen is soft. There is no mass. Tenderness: There is no abdominal tenderness. There is no guarding or rebound. Hernia: No hernia is present. Musculoskeletal:         General: No tenderness or deformity. Cervical back: Normal range of motion and neck supple. Lymphadenopathy:      Cervical: No cervical adenopathy. Skin:     General: Skin is warm and dry. Capillary Refill: Capillary refill takes less than 2 seconds. Coloration: Skin is not pale. Findings: No erythema or rash. Neurological:      Mental Status: He is alert and oriented to person, place, and time. Cranial Nerves: No cranial nerve deficit. Sensory: No sensory deficit. Motor: No abnormal muscle tone. Coordination: Coordination normal.      Deep Tendon Reflexes: Reflexes normal.   Psychiatric:         Behavior: Behavior normal.         Thought Content: Thought content normal.         Judgment: Judgment normal.       ANCILLARY TEST RESULTS   EKG:    Interpreted by me  Sinus tachycardia, ventricular rate of 122 bpm, MS interval 140 ms, QRS duration 80 ms,  ms, no ST elevation or acute T wave    LAB RESULTS:  Results for orders placed or performed during the hospital encounter of 05/17/22   COVID-19, Rapid   Result Value Ref Range    SARS-CoV-2, NAAT NOT  DETECTED NOT DETECTED   Basic Metabolic Panel w/ Reflex to MG   Result Value Ref Range    Sodium 145 135 - 145 meq/L    Potassium reflex Magnesium 4.6 3.5 - 5.2 meq/L    Chloride 105 98 - 111 meq/L    CO2 24 23 - 33 meq/L    Glucose 164 (H) 70 - 108 mg/dL    BUN 11 7 - 22 mg/dL    CREATININE 1.0 0.4 - 1.2 mg/dL    Calcium 10.1 8.5 - 10.5 mg/dL   Brain Natriuretic Peptide   Result Value Ref Range    Pro-BNP 36.1 0.0 - 900.0 pg/mL   CBC with Auto Differential   Result Value Ref Range    WBC 11.6 (H) 4.8 - 10.8 thou/mm3    RBC 5.61 4.70 - 6.10 mill/mm3    Hemoglobin 17.4 14.0 - 18.0 gm/dl Hematocrit 52.2 (H) 42.0 - 52.0 %    MCV 93.0 80.0 - 94.0 fL    MCH 31.0 26.0 - 33.0 pg    MCHC 33.3 32.2 - 35.5 gm/dl    RDW-CV 13.0 11.5 - 14.5 %    RDW-SD 44.3 35.0 - 45.0 fL    Platelets 795 090 - 961 thou/mm3    MPV 9.6 9.4 - 12.4 fL    Seg Neutrophils 59.2 %    Lymphocytes 28.2 %    Monocytes 10.0 %    Eosinophils 1.1 %    Basophils 1.2 %    Immature Granulocytes 0.3 %    Segs Absolute 6.9 1.8 - 7.7 thou/mm3    Lymphocytes Absolute 3.3 1.0 - 4.8 thou/mm3    Monocytes Absolute 1.2 0.4 - 1.3 thou/mm3    Eosinophils Absolute 0.1 0.0 - 0.4 thou/mm3    Basophils Absolute 0.1 0.0 - 0.1 thou/mm3    Immature Grans (Abs) 0.04 0.00 - 0.07 thou/mm3    nRBC 0 /100 wbc   Troponin   Result Value Ref Range    Troponin T < 0.010 ng/ml   D-Dimer, Quantitative   Result Value Ref Range    D-Dimer, Quant 889.00 (H) 0.00 - 500.00 ng/ml FEU   TSH with Reflex   Result Value Ref Range    TSH 3.380 0.400 - 4.200 uIU/mL   Magnesium   Result Value Ref Range    Magnesium 2.3 1.6 - 2.4 mg/dL   Anion Gap   Result Value Ref Range    Anion Gap 16.0 8.0 - 16.0 meq/L   Glomerular Filtration Rate, Estimated   Result Value Ref Range    Est, Glom Filt Rate 75 (A) ml/min/1.73m2   Osmolality   Result Value Ref Range    Osmolality Calc 291.7 275.0 - 300.0 mOsmol/kg   EKG 12 Lead   Result Value Ref Range    Ventricular Rate 122 BPM    Atrial Rate 122 BPM    P-R Interval 140 ms    QRS Duration 80 ms    Q-T Interval 290 ms    QTc Calculation (Bazett) 413 ms    P Axis 77 degrees    R Axis 88 degrees    T Axis 42 degrees       RADIOLOGY REPORTS  CTA CHEST W WO CONTRAST   Final Result   Impression:      No evidence of pulmonary embolus. This document has been electronically signed by: Ruth Saeed MD on    05/18/2022 12:14 AM      All CTs at this facility use dose modulation techniques and iterative    reconstructions, and/or weight-based dosing   when appropriate to reduce radiation to a low as reasonably achievable.       XR CHEST PORTABLE Final Result   Impression:      No acute processes. This document has been electronically signed by: Sol Hammer MD on    05/17/2022 11:02 PM          MEDICAL [facility] [address] [id number] ([facility])     Differential Diagnosis:   Atrial fibrillation with RVR, dehydration, electrolyte imbalance, ACS, CHF, PE      Summary:    · He was given normal saline bolus. · Vagal maneuver was performed, which slowed down patient's ventricular rate with cardiac monitor showing atrial fibrillation with RVR  · Repeat EKG confirmed A. fib RVR although first EKG on arrival showed sinus tachycardia. · Patient was given Lopressor 5 mg IV which did not slow down his ventricular rate  · We had no Cardizem drip, but patient's ventricular rate was under control with 20 mg of Cardizem IV bolus  · Heparin drip is started given his CHADS2 at 4  · Admitted to Hospitalist service. · CTA chest neg for PE.     CHADS2 Score for Atrial Fibrillation Stroke Risk     RESULT SUMMARY:  4 points  High risk of thromboembolic event. 8.5% risk of event per year if no coumadin. The adjusted stroke rate was the expected stroke rate per 100 person-years derived from the multivariable model assuming that aspirin was not taken. INPUTS:  CHF history --> 0 = No  Hypertension history --> 1 = Yes  Age ?  77 years --> 0 = No  Diabetes mellitus history --> 1 = Yes  Stroke or TIA symptoms previously --> 2 = Yes    ED Medications  Medications   heparin (porcine) injection 4,000 Units (has no administration in time range)   heparin (porcine) injection 2,000 Units (has no administration in time range)   heparin 25,000 unit in sodium chloride 0.45% 250 mL (premix) infusion (12 Units/kg/hr × 79.4 kg IntraVENous New Bag 5/18/22 0019)   0.9 % sodium chloride bolus (0 mLs IntraVENous Stopped 5/18/22 0045)   metoprolol (LOPRESSOR) injection 5 mg (5 mg IntraVENous Given 5/17/22 2304)   heparin (porcine) injection 4,000 Units (4,000 Units IntraVENous Given 5/18/22 0010) iopamidol (ISOVUE-370) 76 % injection 80 mL (80 mLs IntraVENous Given 5/17/22 2335)   dilTIAZem injection 20 mg (20 mg IntraVENous Given 5/18/22 0004)     Vital signs in ED  Vitals:    05/17/22 2221 05/17/22 2227 05/17/22 2308 05/18/22 0052   BP:  133/78 115/82    Pulse:  114 147 109   Resp:  18 24    Temp: 98.7 °F (37.1 °C)      TempSrc: Oral      SpO2:  98%  96%   Weight:           CRITICAL CARE   CRITICAL CARE: There was a high probability of clinically significant/life threatening deterioration in this patient's condition which required my urgent intervention. Total critical care time was 30 minutes. This excludes any time for separately reportable procedures. CONSULTS   Hospitalist    PROCEDURES   None    FINAL IMPRESSION AND DISPOSITION      1. Atrial fibrillation with RVR (HCC)            PATIENT REFERRED TO:  No follow-up provider specified.     DISCHARGE MEDICATIONS:  New Prescriptions    No medications on file       (Please note that portions of this note were completed with a voice recognition program.  Efforts were made to edit the dictations but occasionally words aremis-transcribed.)    MD David Nicole MD  05/18/22 0131

## 2022-05-18 NOTE — ED NOTES
Received handoff from [facility]. Pt resting in bed at this time with no complaints of pain, but an elevated HR. Pt denies any needs currently.  Call light in place      Kyle Saver  05/17/22 7058

## 2022-05-18 NOTE — ED NOTES
Pt resting in bed with wife bedside, denies needs at this time. Pt ADRIENNE Mojica Peaks  05/18/22 2495

## 2022-05-18 NOTE — ED NOTES
Pt wife concerned about HR at this time, updated and educated at this time. Fluids restarted per [facility].       Jamari Yazmin  05/18/22 0839

## 2022-05-18 NOTE — ED NOTES
Pt transported to 8A16 by wheelchair in stable condition. Called 8A and informed Mary that the patient was on their way to the unit.       Charbel Demarco, REBECCA  99/09/78 9450

## 2022-05-18 NOTE — ED TRIAGE NOTES
Presents to ER from home with concern of rapid HR. Squad ekg 167 bpm. Pt reports at home readings of 200s and BP in 80s. Pt reports he was working in the yard when he started to feel bad. Pt reports \"chest pressure\" and dizziness. Pt reports he drank a soda today and normally does not drink soda. Pt reports no sob. Upon arrival to ER pt converted. HR 110s. At this time.  Will monitor

## 2022-05-18 NOTE — PROGRESS NOTES
Hospitalist Progress Note    Patient:  Elmer Cooper      Unit/Bed:8A-16/016-A    YOB: 1955    MRN: [id number]       Acct: [de-identified]     PCP: ARTURO Kat CNP    Date of Admission: 5/17/2022    Assessment/Plan:    1. New onset atrial fibrillation--CPG1RY4-BLSa score equals 4; appreciate cardiology input, planning for ROSA cardioversion today; currently on a heparin drip and amiodarone drip to be added per cardiology; on aspirin  2. Essential hypertension, uncontrolled--on lisinopril, Lopressor; monitor  3. Diabetes mellitus type 2, controlled--currently on medium dose sliding scale here, at home takes [place], Ozempic and Januvia; hemoglobin A1c was noted to be 6.8 on December 1, 2021  4. History of factor V Leiden mutation history of CVA in 2013 with residual right temporal hemianopsia--on aspirin 325 mg daily  5. Depression--treated  6. Central sleep apnea due to Cheyne-Spence respirations--on CPAP~air curve 10 ASV per pulmonology note dated November 12, 2021      Expected discharge date: Possibly 5/19    Disposition:    [x] Home       [] TCU       [] Rehab       [] Psych       [] SNF       [] [place]       [] Other-    Chief Complaint: rapid heart rate    Hospital Course: per H&P dated 5/18: Malia Bauer is a 61 y.o. male with a significant PMHx of HTN, NIDDM2, Factor 5 Leiden mutation, JEANETTE, Depression, Previous CVA who presents to [facility] ED for evaluation of sudden onset of palpitations and rapid heart rate starting 830pm previous evening. Per report HR was 167 on EMS arrival. Patient states he was working in the yard and started to feel unwell. He reported chest pressure and dizziness. He checked his BP which was 80/40 per report. He states he also checked his glucose as sometimes he gets these symptoms with high glucose however stated his sugar was normal. Upon arrival to the ED patient was noted to be tachycardic.  Initial EKG demonstrated sinus **OR** dextrose bolus, glucagon (rDNA), dextrose, heparin (porcine), heparin (porcine)    No intake or output data in the 24 hours ending 05/18/22 0825    Diet:  Diet NPO    Exam:  BP (!) 141/61   Pulse 101   Temp 98.1 °F (36.7 °C) (Oral)   Resp 18   Ht 6' (1.829 m)   Wt 172 lb 6.4 oz (78.2 kg)   SpO2 99%   BMI 23.38 kg/m²     General appearance: No apparent distress, appears stated age and cooperative. HEENT: Pupils equal, round, and reactive to light. Conjunctivae/corneas clear. Neck: Supple, with full range of motion. No jugular venous distention. Trachea midline. Respiratory:  Normal respiratory effort. Clear to auscultation, bilaterally without Rales/Wheezes/Rhonchi. Cardiovascular: irregular rate and rhythm   Abdomen: Soft, non-tender, non-distended with normal bowel sounds. Musculoskeletal: passive and active ROM x 4 extremities. Skin: Skin color, texture, turgor normal.    Neurologic:  Neurovascularly intact without any focal sensory/motor deficits. Cranial nerves: II-XII intact, grossly non-focal.  Psychiatric: Alert and oriented, thought content appropriate  Capillary Refill: Brisk,< 3 seconds   Peripheral Pulses: +2 palpable, equal bilaterally     Labs:   Recent Labs     05/17/22 2221 05/18/22  0728   WBC 11.6* 9.0   HGB 17.4 15.3   HCT 52.2* 46.3    219     Recent Labs     05/17/22  2221 05/18/22  0728    140   K 4.6 4.0    106   CO2 24 22*   BUN 11 9   CREATININE 1.0 0.6   CALCIUM 10.1 8.3*     Recent Labs     05/17/22 2221   INR 0.92     Microbiology:    COVID (-)    Radiology:  CTA CHEST W WO CONTRAST    Result Date: 5/18/2022  CT angiogram chest/pulmonary arteries with contrast Multiplanar reconstructions and MIPS Comparison: None Findings: No filling defects are noted to suggest pulmonary embolus. Main pulmonary artery normal in caliber. No significant focal parenchymal abnormalities. No significant mediastinal or hilar adenopathy. No free pleural fluid noted.  No acute bony abnormalities. Lower cervical fusion hardware partially visualized. Thoracic aorta normal caliber without dissection. Heart size normal. Great vessel origins patent. No coronary calcifications. Impression: No evidence of pulmonary embolus. This document has been electronically signed by: Darion Smith MD on 05/18/2022 12:14 AM All CTs at this facility use dose modulation techniques and iterative reconstructions, and/or weight-based dosing when appropriate to reduce radiation to a low as reasonably achievable. XR CHEST PORTABLE    Result Date: 5/17/2022  1 view chest x-ray. Comparison: 8/9/2012 Findings: Lungs are clear without acute infiltrates. No pneumothorax. Heart size normal. No acute bony abnormalities. Cervical fusion hardware. Impression: No acute processes.  This document has been electronically signed by: Darion Smith MD on 05/17/2022 11:02 PM      DVT prophylaxis: [] Lovenox                                 [] SCDs                                 [x] Heparin gtt                                 [] Encourage ambulation           [] Already on Anticoagulation     Code Status: Full Code    PT/OT Eval Status: monitor    Tele:   [x] yes atrial fib heart rate 141             [] no    Active Hospital Problems    Diagnosis Date Noted    New onset atrial fibrillation [facility]) [I48.91] 05/18/2022     Priority: Medium       Electronically signed by ARTURO Garcia CNP on 5/18/2022 at 8:25 AM

## 2022-05-18 NOTE — H&P
Hospitalist - History & Physical      Patient: Bandar Cooper    Unit/Bed:18/018A  YOB: 1955  MRN: [id number]   Acct: [de-identified]   PCP: ARTURO Younger CNP    Date of Service: Pt seen/examined on 05/18/22  and Admitted to Inpatient with expected LOS less than two midnights due to medical therapy. Chief Complaint:  Palpitation and rapid heart rate    Assessment and Plan:-  New onset atrial fibrillation: [id number] Score: 4 Onset <48hrs. S/p IV Lopressor without response. Rate controlled with 20mg Cardizem push. If rate becomes uncontrolled consider additional Cardizem push due to good response initially. TSH WNL. Will start Cardizem CD 120mg daily. Continue heparin gtt. Monitor with tele. Repeat BMP and Mg in AM. Will repeat EKG in AM. Last Echo in 2019 shows EF 60% with normal LV fn. Repeat Echo ordered. NPO at midnight. Cardiology consulted, appreciate recommendations. Essential Hypertension: Contiue lisinopril with parameters. D/c amlodipine due to starting Cardizem. Adjust antihypertensives pending clinical outcome. NIDDM2: HgbA1c 6.8 (12/1/2021). Home medications include Jardiance and Ozempic. Hold home medications. SSI with hypoglycemia protocol. JEANETTE: Compliant with pap. Continue nocturnal pap. Unknown settings per patient though states he follows with pulmonology yearly. Factor 5 Leiden mutation, hx of: Per chart review. Not on [address]. 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 Takes ASA 325mg daily at home. Depression, hx of: Continue Trentelllix. History Of Present Illness:    Montrell Perez is a 61 y.o. male with a significant PMHx of HTN, NIDDM2, Factor 5 Leiden mutation, JEANETTE, Depression, Previous CVA who presents to [facility] ED for evaluation of sudden onset of palpitations and rapid heart rate starting 830pm previous evening. Per report HR was 167 on EMS arrival. Patient states he was working in the yard and started to feel unwell.   He reported chest pressure and dizziness. He checked his BP which was 80/40 per report. He states he also checked his glucose as sometimes he gets these symptoms with high glucose however stated his sugar was normal. Upon arrival to the ED patient was noted to be tachycardic. Initial EKG demonstrated sinus tachycardia. Vagal maneuvers applied in the ED did slow patient HR down and cardiac monitoring showed afib with RVR. Lopressor was tried without any measurable response. Patient was then administered 20mg cardizem bolus which did control patients HR. Patient was started on heparin gtt in the ED due to [facility] score of 4. No previous history of afib. CXR with no acute process and CTA chest negative for PE. TSH WNL. Patient denies fever, chills, chest pain, SOB, abdominal pain, n/v/d/c. Patient was admitted to hospitalist service for further evaluation and management. Past Medical History:        Diagnosis Date    BPH (benign prostatic hyperplasia)     Depression     Diabetes mellitus ([facility] [address] [facility]     Factor 5 Leiden mutation, heterozygous ([facility] [facility]     Heterozygous MTHFR mutation [id number]     Hyperglycemia     Hypertension     Hypertension     Osteoarthritis     Sleep apnea     Stroke ([facility] [address] 8/8/2013    Type II or unspecified type diabetes mellitus without mention of complication, not stated as uncontrolled 8/2012    Visual field cut 8/8/2013    Right visual field cut       Past Surgical History:        Procedure Laterality Date    ABDOMEN SURGERY      APPENDECTOMY      CERVICAL FUSION  08    COLONOSCOPY  6/8/12    [facility]    TONSILLECTOMY      UMBILICAL HERNIA REPAIR  09/17/2012  Dr Kwame Mello Medications:   No current facility-administered medications on file prior to encounter.      Current Outpatient Medications on File Prior to Encounter   Medication Sig Dispense Refill    JARDIANCE 25 MG tablet take 1 tablet by mouth once daily 30 tablet 2    OZEMPIC, 1 MG/DOSE, 4 MG/3ML SOPN INJECT 1 MG INTO THE SKIN ONCE A WEEK 3 mL 5    VORTIoxetine (TRINTELLIX) 10 MG TABS tablet take 1 tablet by mouth once daily 90 tablet 2    HYDROcodone-acetaminophen (NORCO) 5-325 MG per tablet Take 1 tablet by mouth every 6 hours as needed for Pain.  cyclobenzaprine (FLEXERIL) 5 MG tablet Take 5 mg by mouth 3 times daily as needed for Muscle spasms      benazepril (LOTENSIN) 40 MG tablet TAKE 1 TABLET BY MOUTH DAILY 90 tablet 3    amLODIPine (NORVASC) 10 MG tablet TAKE 1 TABLET BY MOUTH DAILY 90 tablet 3    JANUVIA 100 MG tablet TAKE 1 TABLET BY MOUTH EVERY DAY 90 tablet 3    aspirin 325 MG EC tablet take 1 tablet by mouth once daily 90 tablet 3    Blood Glucose Monitoring Suppl (ACCU-CHEK GUIDE) w/Device KIT CHECK BLOOD SUGAR DAILY      triamcinolone (NASACORT ALLERGY 24HR) 55 MCG/ACT nasal inhaler 2 sprays by Each Nostril route daily 1 Inhaler 2    TRUEplus Lancets 33G MISC USE TWICE A  each 3    blood glucose test strips (ACCU-CHEK SMARTVIEW) strip CHECK BLOOD SUGAR TWICE A DAY AS NEEDED FOR SYMPTOMS OF IRREGULAR BLOOD GLUCOSE 100 strip 3    Apple Sudhir Vn-Grn Tea-Bit Or-Cr (APPLE CIDER VINEGAR PLUS) TABS Take by mouth      ACCU-CHEK SMARTVIEW strip TEST BLOOD SUGAR ONCE A DAY 50 strip 11    Cholecalciferol (VITAMIN D3) 2000 units CAPS Take by mouth      latanoprost (XALATAN) 0.005 % ophthalmic solution Place 1 drop into both eyes daily       Esomeprazole Magnesium (NEXIUM 24HR PO) Take by mouth      OLIVE LEAF PO Take by mouth       NONFORMULARY daily PROSTATE SUPPORT      multivitamin (THERAGRAN) per tablet Take 1 tablet by mouth daily.  Lancets MISC Use qd  Dx:  250.02 50 each 11    cetirizine (ZYRTEC) 10 MG tablet Take 10 mg by mouth daily. Allergies:    Patient has no known allergies. Social History:    reports that he has been smoking e-cigarettes. He has a 20.00 pack-year smoking history. He has never used smokeless tobacco. He reports current alcohol use.  He reports that he does not use drugs. Family History:       Problem Relation Age of Onset    Diabetes Father     Diabetes Brother     Stroke Brother        Diet:  No diet orders on file    Review of systems:   Pertinent positives as noted in the HPI. All other systems reviewed and negative. PHYSICAL EXAM:  /82   Pulse 96   Temp 98.7 °F (37.1 °C) (Oral)   Resp 20   Wt 175 lb (79.4 kg)   SpO2 96%   BMI 23.73 kg/m²      General appearance: No apparent distress, appears stated age and cooperative. HEENT: Normal cephalic, atraumatic without obvious deformity. Pupils equal, round, and reactive to light. Extra ocular muscles intact. Conjunctivae/corneas clear. Neck: Supple, with full range of motion. No jugular venous distention. Trachea midline. Respiratory:  Normal respiratory effort. Clear to auscultation, bilaterally without Rales/Wheezes/Rhonchi. Cardiovascular: Irregularly irregular rhythm. Normal S1/S2 without murmurs, rubs or gallops. Abdomen: Soft, non-tender, non-distended with normal bowel sounds. Musculoskeletal:  No clubbing, cyanosis or edema bilaterally. Skin: Skin color, texture, turgor normal.  No rashes or lesions. Neurologic:  Neurovascularly intact without any focal sensory/motor deficits. Cranial nerves: II-XII intact, grossly non-focal.  Psychiatric: Alert and oriented, thought content appropriate, normal insight  Capillary Refill: Brisk,< 3 seconds   Peripheral Pulses: +2 palpable, equal bilaterally     Labs:   Recent Labs     05/17/22  2221   WBC 11.6*   HGB 17.4   HCT 52.2*        Recent Labs     05/17/22  2221      K 4.6      CO2 24   BUN 11   CREATININE 1.0   CALCIUM 10.1     No results for input(s): AST, ALT, BILIDIR, BILITOT, ALKPHOS in the last 72 hours. Recent Labs     05/17/22  2221   INR 0.92     No results for input(s): Towana Ball in the last 72 hours.     Urinalysis:    Lab Results   Component Value Date    BLOODU neg 10/01/2020    SPECGRAV 1.020 10/01/2020    GLUCOSEU 500 10/01/2020       Radiology:   CTA CHEST W WO CONTRAST   Final Result   Impression:      No evidence of pulmonary embolus. This document has been electronically signed by: Arabella Dominguez MD on    05/18/2022 12:14 AM      All CTs at this facility use dose modulation techniques and iterative    reconstructions, and/or weight-based dosing   when appropriate to reduce radiation to a low as reasonably achievable. XR CHEST PORTABLE   Final Result   Impression:      No acute processes. This document has been electronically signed by: Arabella Dominguez MD on    05/17/2022 11:02 PM        CTA CHEST W WO CONTRAST    Result Date: 5/18/2022  CT angiogram chest/pulmonary arteries with contrast Multiplanar reconstructions and MIPS Comparison: None Findings: No filling defects are noted to suggest pulmonary embolus. Main pulmonary artery normal in caliber. No significant focal parenchymal abnormalities. No significant mediastinal or hilar adenopathy. No free pleural fluid noted. No acute bony abnormalities. Lower cervical fusion hardware partially visualized. Thoracic aorta normal caliber without dissection. Heart size normal. Great vessel origins patent. No coronary calcifications. Impression: No evidence of pulmonary embolus. This document has been electronically signed by: Arabella Dominguez MD on 05/18/2022 12:14 AM All CTs at this facility use dose modulation techniques and iterative reconstructions, and/or weight-based dosing when appropriate to reduce radiation to a low as reasonably achievable. XR CHEST PORTABLE    Result Date: 5/17/2022  1 view chest x-ray. Comparison: 8/9/2012 Findings: Lungs are clear without acute infiltrates. No pneumothorax. Heart size normal. No acute bony abnormalities. Cervical fusion hardware. Impression: No acute processes.  This document has been electronically signed by: Arabella Dominguez MD on 05/17/2022 11:02 PM    EKG: Sinus tachycardia     Case and plan developed in coordination with Freddie Douglas MD      Electronically signed by Edyta Cutler DO on 5/18/2022 at 3:28 AM

## 2022-05-18 NOTE — ED NOTES
Pt, wife and daughter informed on POC, and educated on what a fib is, how it's treated, different medications to treat a fib, and the plan of care for the pt here today. Family all appreciative. Denies any needs or questions following education.       Sudeep Pacheco  05/18/22 0050

## 2022-05-18 NOTE — ED NOTES
Handoff given to [facility], RN. Pt VSS.  Pt and wife updated on POC     Amaris Bones  05/18/22 0748

## 2022-05-18 NOTE — ED NOTES
Pt and wife provided with food at this time.  Denies any additional needs, call light remains in place     Bolivar Friedman  05/18/22 0051

## 2022-05-18 NOTE — CONSULTS
[facility]  Cardiology Consult      Patient:  Levi Martin  YOB: 1955    MRN: [id number]   Acct: [de-identified]     Primary Care Physician: ARTURO Bianchi CNP    REASON FOR CONSULT:    New onset AFib < 48hrs. Evaluate for potential elective ccardioversion     CHIEF COMPLAINT:    Palpitations     HISTORY OF PRESENT ILLNESS:    Levi Martin is a pleasant 61year old male patient with past medical history that includes:   Past Medical History:   Diagnosis Date    BPH (benign prostatic hyperplasia)     Depression     [facility] ([facility]     [facility] mutation, [facility] ([facility]     Heterozygous MTHFR mutation [id number]     Hyperglycemia     Hypertension     Hypertension     Osteoarthritis     Sleep apnea     Stroke ([facility] 8/8/2013    Type II or unspecified type diabetes mellitus without mention of complication, not stated as uncontrolled 8/2012    Visual field cut 8/8/2013    Right visual field cut   Echo in 2019 revealed an EF of 60%. The patient was admitted to the hospital on 5/17/2022 after he presented with palpitations. He was found to be tachycardic in ER, . Patient reports having low BP at home, 80/40. Patient was reported to have A Fib in ER. EKG on 5/17 was c/w sinus tachycardia, . Telemetry is c/w atrial fibrillation, HR in 130s. Chest CTA was negative for PE. Troponin was <0.01. Patient states that last night after he did some yard work, he started having chest pain, described as pressure, left sided, not radiating, up to 4/10 in severity, gradually resolved, associated with palpations. Patient states that chest pain occurs mostly when he has the palpitations. He reports dyspnea on exertion, gets tired easily climbing stairs. Patient denies orthopnea, paroxysmal nocturnal dyspnea, dizziness, syncope, recent weight gain or leg swelling. He vapes, has prior h/o cigarette smoking.      All labs, EKG's, diagnostic testing and images as well as cardiac cath, stress testing   were reviewed during this encounter    CARDIAC TESTING  Echo:   ECHO: Results for orders placed during the hospital encounter of 11/15/19    ECHO Complete 2D W Doppler W Color    Narrative  Transthoracic Echocardiography Report (TTE)    Demographics    Patient Name    Anum Calderon Gender                Male    MR #            [id number]       Race                      Ethnicity    Account #       [de-identified]       Room Number    Accession       [id number]       Date of Study         11/15/2019  Number    Date of Birth   1955      Referring Physician   Kelin Angel MD  [address], [address]    Age             60 year(s)      Justin Mahoney [facility]    Interpreting          Kelin Angel MD  Physician    Procedure    Type of Study    TTE procedure:ECHOCARDIOGRAM COMPLETE 2D W DOPPLER W COLOR. Procedure Date  Date: 11/15/2019 Start: 08:42 AM    Study Location: Echo Lab  Technical Quality: Adequate visualization    Indications:Dyspnea / Shortness of breath. Additional Medical History:Hypertension, Osteoarthritis, Diabetes, Stroke    Patient Status: Routine    Height: 72 inches Weight: 198 pounds BSA: 2.12 m^2 BMI: 26.85 kg/m^2    BP: 146/72 mmHg    Conclusions    Summary  Ejection fraction is visually estimated at 60%. Overall left ventricular function is normal.    Signature    ----------------------------------------------------------------  Electronically signed by Kelin Angel MD (Interpreting  physician) on 11/15/2019 at 04:21 PM  ----------------------------------------------------------------    Findings    Mitral Valve  The mitral valve structure was normal with normal leaflet separation. DOPPLER: The transmitral velocity was within the normal range with no  evidence for mitral stenosis. There was no evidence of mitral  regurgitation.     Aortic Valve  The aortic valve was trileaflet with normal thickness and cuspal  separation. DOPPLER: Transaortic velocity was within the normal range with  no evidence of aortic stenosis. There was no evidence of aortic  regurgitation. Tricuspid Valve  The tricuspid valve structure was normal with normal leaflet separation. DOPPLER: There was no evidence of tricuspid stenosis. There was no  evidence of tricuspid regurgitation. Pulmonic Valve  The pulmonic valve was not well visualized . Trivial pulmonic regurgitation visualized. Left Atrium  Left atrial size was normal.    Left Ventricle  Ejection fraction is visually estimated at 60%. Overall left ventricular function is normal.    Right Atrium  Right atrial size was normal.    Right Ventricle  The right ventricular size was normal with normal systolic function and  wall thickness. Pericardial Effusion  The pericardium was normal in appearance with no evidence of a pericardial  effusion. Pleural Effusion  No evidence of pleural effusion. Aorta / Great Vessels  -Aortic root dimension within normal limits.  -The Pulmonary artery is within normal limits. -IVC size is within normal limits with normal respiratory phasic changes.     M-Mode/2D Measurements & Calculations    LV Diastolic    LV Systolic Dimension: 3.2  AV Cusp Separation: 2.2 cmLA  Dimension: 4.9  cm                          Dimension: 3.3 cmAO Root  cm              LV Volume Diastolic: 559 ml Dimension: 3.6 cm  LV FS:34.7 %    LV Volume Systolic: 41 ml  LV PW           LV EDV/LV EDV Index: 134  Diastolic: 1 cm TY/86 D^4QB ESV/LV ESV  Septum          Index: 41 ml/19 m^2         RV Diastolic Dimension: 2.6 cm  Diastolic: 0.8  EF Calculated: 63.7 %  cm                                          LA/Aorta: 0.92    Doppler Measurements & Calculations    MV Peak E-Wave: 76.4 cm/s  AV Peak Velocity: 144 LVOT Peak Velocity: 104  MV Peak A-Wave: 79.2 cm/s  cm/s                  cm/s  MV E/A Ratio: 0.96         AV Peak Gradient:     LVOT Peak Gradient: 4  MV Peak Gradient: 2.33     8.29 mmHg             mmHg  mmHg    MV Deceleration Time: 264  msec                                             TR Velocity:277 cm/s  TR Gradient:30.69 mmHg  PV Peak Velocity: 85.9  MV E' Septal Velocity: 8.1                       cm/s  cm/s                       AV DVI (Vmax):0.72    PV Peak Gradient: 2.95  MV A' Septal Velocity: 9.6                       mmHg  cm/s  MV E' Lateral Velocity:  10.1 cm/s  MV A' Lateral Velocity:  13.4 cm/s  E/E' septal: 9.43  E/E' lateral: 7.56  MR Velocity: 469 cm/s    [id number]       Stress Test:2019        Summary    This Nuclear Medicine study was negative for ischemia .    increased gut uptake    normal EF        Recommendation    Medical management.        Signatures        ----------------------------------------------------------------    Electronically signed by Lynsey Reyes MD (Interpreting    Cardiologist) on 11/15/2019 at 17:12    ----------------------------------------------------------------             Past Medical History:    Past Medical History:   Diagnosis Date    BPH (benign prostatic hyperplasia)     Depression     Diabetes mellitus ([facility]     Factor 5 Leiden mutation, heterozygous ([facility]     Heterozygous MTHFR mutation [id number]     Hyperglycemia     Hypertension     Hypertension     Osteoarthritis     Sleep apnea     Stroke ([facility] 8/8/2013    Type II or unspecified type diabetes mellitus without mention of complication, not stated as uncontrolled 8/2012    Visual field cut 8/8/2013    Right visual field cut       Past Surgical History:    Past Surgical History:   Procedure Laterality Date    ABDOMEN SURGERY      APPENDECTOMY      CERVICAL FUSION  08    [facility]    TONSILLECTOMY      UMBILICAL HERNIA REPAIR  09/17/2012  Dr Ronny Tran       Medications Prior to Admission:    Medications Prior to Admission: JARDIANCE 25 MG tablet, take 1 tablet by mouth once daily  OZEMPIC, 1 MG/DOSE, 4 MG/3ML SOPN, INJECT 1 MG INTO THE SKIN ONCE A WEEK  VORTIoxetine (TRINTELLIX) 10 MG TABS tablet, take 1 tablet by mouth once daily  HYDROcodone-acetaminophen (NORCO) 5-325 MG per tablet, Take 1 tablet by mouth every 6 hours as needed for Pain. cyclobenzaprine (FLEXERIL) 5 MG tablet, Take 5 mg by mouth 3 times daily as needed for Muscle spasms  benazepril (LOTENSIN) 40 MG tablet, TAKE 1 TABLET BY MOUTH DAILY  amLODIPine (NORVASC) 10 MG tablet, TAKE 1 TABLET BY MOUTH DAILY  JANUVIA 100 MG tablet, TAKE 1 TABLET BY MOUTH EVERY DAY  aspirin 325 MG EC tablet, take 1 tablet by mouth once daily  Blood Glucose Monitoring Suppl (ACCU-CHEK GUIDE) w/Device KIT, CHECK BLOOD SUGAR DAILY  triamcinolone (NASACORT ALLERGY 24HR) 55 MCG/ACT nasal inhaler, 2 sprays by Each Nostril route daily  TRUEplus Lancets 33G MISC, USE TWICE A DAY  blood glucose test strips (ACCU-CHEK SMARTVIEW) strip, CHECK BLOOD SUGAR TWICE A DAY AS NEEDED FOR SYMPTOMS OF IRREGULAR BLOOD GLUCOSE  Apple Sudhir Vn-Grn Tea-Bit Or-Cr (APPLE CIDER VINEGAR PLUS) TABS, Take by mouth  ACCU-CHEK SMARTVIEW strip, TEST BLOOD SUGAR ONCE A DAY  Cholecalciferol (VITAMIN D3) 2000 units CAPS, Take by mouth  latanoprost (XALATAN) 0.005 % ophthalmic solution, Place 1 drop into both eyes daily   Esomeprazole Magnesium (NEXIUM 24HR PO), Take by mouth  OLIVE LEAF PO, Take by mouth   NONFORMULARY, daily PROSTATE SUPPORT  multivitamin (THERAGRAN) per tablet, Take 1 tablet by mouth daily. Lancets MISC, Use qd Dx:  250.02  cetirizine (ZYRTEC) 10 MG tablet, Take 10 mg by mouth daily. Allergies:    Patient has no known allergies. Social History:    reports that he has been smoking e-cigarettes. He has a 20.00 pack-year smoking history. He has never used smokeless tobacco. He reports current alcohol use. He reports that he does not use drugs.     Family History:   family history includes Diabetes in his brother and father; Stroke in his brother. REVIEW OF SYSTEMS:  Constitutional: negative for anorexia, chills and fevers,weight change  Skin: negative for new skin rash per patient  HEENT: negative for head trauma or new visual changes  Respiratory: negative for cough, hemoptysis, wheezing  Cardiovascular: negative for  orthopnea, palpitations and syncope. Gastrointestinal: negative for abdominal pain,nausea , vomiting, constipation, diarrhea. Hematologic/lymphatic: negative for bruising,prolonged bleeding,blood clots  Musculoskeletal:negative for muscle weakness, myalgias,wasting  Neurological: negative for coordination problems, dizziness, gait problems and vertigo  Behavioral/Psych:negative for mood/sleep disturbance      PHYSICAL EXAM:   Vitals:  Patient Vitals for the past 24 hrs:   BP Temp Temp src Pulse Resp SpO2 Height Weight   05/18/22 0745 (!) 141/61 98.1 °F (36.7 °C) Oral 101 18 99 % 6' (1.829 m) 172 lb 6.4 oz (78.2 kg)   05/18/22 0715 133/89 -- -- 134 19 98 % -- --   05/18/22 0402 -- -- -- 118 18 100 % -- --   05/18/22 0200 -- -- -- 96 20 96 % -- --   05/18/22 0052 -- -- -- 109 -- 96 % -- --   05/17/22 2308 115/82 -- -- 147 24 -- -- --   05/17/22 2227 133/78 -- -- 114 18 98 % -- --   05/17/22 2221 -- 98.7 °F (37.1 °C) Oral -- -- -- -- --   05/17/22 2219 -- -- -- -- -- -- -- 175 lb (79.4 kg)   05/17/22 2218 (!) 133/92 -- -- -- -- 98 % -- --   05/17/22 2216 -- -- -- 125 18 -- -- --       Last 3 weights: Wt Readings from Last 3 Encounters:   05/18/22 172 lb 6.4 oz (78.2 kg)   02/23/22 175 lb (79.4 kg)   12/27/21 173 lb (78.5 kg)     24 hour intake/output:No intake or output data in the 24 hours ending 05/18/22 0810  BMI:Body mass index is 23.38 kg/m².     General Appearance: alert and oriented to person, place and time, well developed and well- nourished, in no acute distress  Skin: warm and dry, no rash or erythema  Eyes: pupils equal, round, and reactive to light, extraocular eye movements intact, conjunctivae normal  Neck: supple and non-tender without mass, no thyromegaly or thyroid nodules, no cervical lymphadenopathy  Pulmonary/Chest: clear to auscultation bilaterally- no wheezes, rales or rhonchi, normal air movement, no respiratory distress  Cardiovascular: tachycardic, irregular rhythm, normal S1 and S2, murmur. No rubs, clicks, or gallops, distal pulses intact, no carotid bruits, Negative JVD  Radial Pulses: intact 2+  Abdomen: soft, non-tender, non-distended, normal bowel sounds, no masses or organomegaly  Extremities: no cyanosis, clubbing . no Edema  Musculoskeletal: normal range of motion, no joint swelling, deformity or tenderness      RADIOLOGY   CTA CHEST W WO CONTRAST    Result Date: 5/18/2022  CT angiogram chest/pulmonary arteries with contrast Multiplanar reconstructions and MIPS Comparison: None Findings: No filling defects are noted to suggest pulmonary embolus. Main pulmonary artery normal in caliber. No significant focal parenchymal abnormalities. No significant mediastinal or hilar adenopathy. No free pleural fluid noted. No acute bony abnormalities. Lower cervical fusion hardware partially visualized. Thoracic aorta normal caliber without dissection. Heart size normal. Great vessel origins patent. No coronary calcifications. Impression: No evidence of pulmonary embolus. This document has been electronically signed by: Kaitlyn Moreno MD on 05/18/2022 12:14 AM All CTs at this facility use dose modulation techniques and iterative reconstructions, and/or weight-based dosing when appropriate to reduce radiation to a low as reasonably achievable. XR CHEST PORTABLE    Result Date: 5/17/2022  1 view chest x-ray. Comparison: 8/9/2012 Findings: Lungs are clear without acute infiltrates. No pneumothorax. Heart size normal. No acute bony abnormalities. Cervical fusion hardware. Impression: No acute processes.  This document has been electronically signed by: Harriet Hart MD on 05/17/2022 11:02 PM      LABS:  Recent Labs     05/17/22 2221   TROPONINT < 0.010     CBC:   Lab Results   Component Value Date    WBC 9.0 05/18/2022    RBC 4.86 05/18/2022    RBC 5.06 01/08/2021    HGB 15.3 05/18/2022    HCT 46.3 05/18/2022    MCV 95.3 05/18/2022    MCH 31.5 05/18/2022    MCHC 33.0 05/18/2022    RDW 12.5 01/08/2021     05/18/2022    MPV 9.2 05/18/2022     BMP:    Lab Results   Component Value Date     05/17/2022    K 4.6 05/17/2022     05/17/2022    CO2 24 05/17/2022    BUN 11 05/17/2022    LABALBU 4.4 01/08/2021    CREATININE 1.0 05/17/2022    CALCIUM 10.1 05/17/2022    LABGLOM 75 05/17/2022    GLUCOSE 164 05/17/2022    GLUCOSE 148 05/21/2021     Hepatic Function Panel:    Lab Results   Component Value Date    ALKPHOS 84 01/08/2021    ALKPHOS 55 05/08/2015    ALT 24 01/08/2021    AST 16 01/08/2021    PROT 6.7 01/08/2021    BILITOT 0.5 01/08/2021    BILIDIR 0.2 08/08/2013    LABALBU 4.4 01/08/2021     Magnesium:    Lab Results   Component Value Date    MG 2.3 05/17/2022     Warfarin PT/INR:  No components found for: PTPATWAR, PTINRWAR  HgBA1c:    Lab Results   Component Value Date    LABA1C 6.8 12/01/2021     FLP:    Lab Results   Component Value Date    TRIG 49 09/18/2020    HDL 47 09/18/2020    HDL 42 03/01/2012    LDLCALC 130 09/18/2020    LDLDIRECT 99 05/08/2015    LABVLDL 10 09/18/2020     TSH:    Lab Results   Component Value Date    TSH 3.380 05/17/2022     BNP: No results found for: BNP      ASSESSMENT:  Bruce Cooper is a pleasant 61year old male patient with past medical history that includes:   Past Medical History:   Diagnosis Date    BPH (benign prostatic hyperplasia)     Depression     Diabetes mellitus ([facility]     Factor 5 Leiden mutation, heterozygous ([facility]     Heterozygous MTHFR mutation [id number]     Hyperglycemia     Hypertension     Hypertension     Osteoarthritis     Sleep apnea     Stroke ([facility] 8/8/2013    Type II or unspecified type diabetes mellitus without mention of complication, not stated as uncontrolled 8/2012    Visual field cut 8/8/2013    Right visual field cut   Echo in 2019 revealed an EF of 60%. The patient was admitted to the hospital on 5/17/2022 after he presented with palpitations. He was found to be tachycardic in ER, . Patient reports having low BP at home, 80/40. Patient was reported to have A Fib in ER. EKG on 5/17 was c/w sinus tachycardia, . Telemetry is c/w atrial fibrillation, HR in 130s. Chest CTA was negative for PE. Troponin was <0.01. Patient states that last night after he did some yard work, he started having chest pain, described as pressure, left sided, not radiating, up to 4/10 in severity, gradually resolved, associated with palpations. Patient states that chest pain occurs mostly when he has the palpitations. He reports dyspnea on exertion, gets tired easily climbing stairs. Patient denies orthopnea, paroxysmal nocturnal dyspnea, dizziness, syncope, recent weight gain or leg swelling. He vapes, has prior h/o cigarette smoking. 1. New onset atrial fibrillation  2. Atrial fibrillation  3. Fatigue  4. Chest pain   5. H/o CVA  6. HTN  7. Factor V Leiden deficiency     RECOMMENDATIONS:  Telemetry reviewed. In AF, HR not controlled, in 130s, goes higher on occasionas  He remains symptomatic   Continue Telemetry monitoring  TSH WNL  Start Amiodarone gtt  Stop po cardizem  Metoprolol, increase dose as tolerated   Echocardiogram is pending  Monitor electrolytes, keep Mg>2, K>4  [id number]-MAIo score is 5, anticoagulation is recommended for stroke prevention  Cont heparin gtt for now  The indication, benefit and risks of anticoagulation (including life-threatening bleeding) were discussed with patient and patient's family.  Patient and family wish to proceed with plans for anticoagulation   Keep NPO  If above measures fail to achieve adequate control of atrial fibrillation, will consider ROSA with DCCV   Chest pain as detailed above. Also, has increased MCCANN  Based on patient's risk factors and clinical presentation, stress test is indicated to investigate for possible underlying ischemic heart disease. Patient  agrees with that work up and its risks and benefits and potential need for additional testing if stress test is abnormal such as cardiac catheterization (Stress test after AF is controlled, ? Tomorrow AM)       Above findings and plan of care were discussed with patient, questions were answered, agreeable to plan. Thank you for allowing me to participate in the care of this patient. Please let me know if I can be of any further assistance.       Ivan Edmondson MD, Cally Hughes   8:10 AM  5/18/2022

## 2022-05-18 NOTE — ED NOTES
Pt wife provided with ice and a recliner at this time. Wife extremely appreciative. Hospitalist resident bedside at this time.  Family denies any other needs, pt MAXIMILIANO Bo  05/18/22 [id number]

## 2022-05-19 ENCOUNTER — APPOINTMENT (OUTPATIENT)
Dept: NON INVASIVE DIAGNOSTICS | Age: 67
DRG: 309 | End: 2022-05-19
Payer: COMMERCIAL

## 2022-05-19 VITALS
OXYGEN SATURATION: 97 % | WEIGHT: 174.3 LBS | HEART RATE: 65 BPM | RESPIRATION RATE: 18 BRPM | TEMPERATURE: 98 F | SYSTOLIC BLOOD PRESSURE: 133 MMHG | HEIGHT: 72 IN | DIASTOLIC BLOOD PRESSURE: 79 MMHG | BODY MASS INDEX: 23.61 KG/M2

## 2022-05-19 LAB
ANION GAP SERPL CALCULATED.3IONS-SCNC: 11 MEQ/L (ref 8–16)
BUN BLDV-MCNC: 10 MG/DL (ref 7–22)
CALCIUM SERPL-MCNC: 8.8 MG/DL (ref 8.5–10.5)
CHLORIDE BLD-SCNC: 104 MEQ/L (ref 98–111)
CO2: 25 MEQ/L (ref 23–33)
CREAT SERPL-MCNC: 0.7 MG/DL (ref 0.4–1.2)
ERYTHROCYTE [DISTWIDTH] IN BLOOD BY AUTOMATED COUNT: 13.1 % (ref 11.5–14.5)
ERYTHROCYTE [DISTWIDTH] IN BLOOD BY AUTOMATED COUNT: 45.4 FL (ref 35–45)
GFR SERPL CREATININE-BSD FRML MDRD: > 90 ML/MIN/1.73M2
GLUCOSE BLD-MCNC: 128 MG/DL (ref 70–108)
GLUCOSE BLD-MCNC: 155 MG/DL (ref 70–108)
GLUCOSE BLD-MCNC: 182 MG/DL (ref 70–108)
HCT VFR BLD CALC: 47.8 % (ref 42–52)
HEMOGLOBIN: 15.9 GM/DL (ref 14–18)
HEPARIN UNFRACTIONATED: 0.38 U/ML (ref 0.3–0.7)
HEPARIN UNFRACTIONATED: 0.4 U/ML (ref 0.3–0.7)
HEPARIN UNFRACTIONATED: 0.53 U/ML (ref 0.3–0.7)
LV EF: 55 %
LVEF MODALITY: NORMAL
MAGNESIUM: 1.9 MG/DL (ref 1.6–2.4)
MCH RBC QN AUTO: 31.6 PG (ref 26–33)
MCHC RBC AUTO-ENTMCNC: 33.3 GM/DL (ref 32.2–35.5)
MCV RBC AUTO: 95 FL (ref 80–94)
PLATELET # BLD: 220 THOU/MM3 (ref 130–400)
PMV BLD AUTO: 9.1 FL (ref 9.4–12.4)
POTASSIUM SERPL-SCNC: 4 MEQ/L (ref 3.5–5.2)
RBC # BLD: 5.03 MILL/MM3 (ref 4.7–6.1)
SODIUM BLD-SCNC: 140 MEQ/L (ref 135–145)
WBC # BLD: 7.2 THOU/MM3 (ref 4.8–10.8)

## 2022-05-19 PROCEDURE — 93306 TTE W/DOPPLER COMPLETE: CPT

## 2022-05-19 PROCEDURE — A9500 TC99M SESTAMIBI: HCPCS | Performed by: NURSE PRACTITIONER

## 2022-05-19 PROCEDURE — 93270 REMOTE 30 DAY ECG REV/REPORT: CPT

## 2022-05-19 PROCEDURE — 82948 REAGENT STRIP/BLOOD GLUCOSE: CPT

## 2022-05-19 PROCEDURE — 85027 COMPLETE CBC AUTOMATED: CPT

## 2022-05-19 PROCEDURE — 99239 HOSP IP/OBS DSCHRG MGMT >30: CPT | Performed by: NURSE PRACTITIONER

## 2022-05-19 PROCEDURE — 93017 CV STRESS TEST TRACING ONLY: CPT | Performed by: INTERNAL MEDICINE

## 2022-05-19 PROCEDURE — 99232 SBSQ HOSP IP/OBS MODERATE 35: CPT | Performed by: PHYSICIAN ASSISTANT

## 2022-05-19 PROCEDURE — 6370000000 HC RX 637 (ALT 250 FOR IP): Performed by: STUDENT IN AN ORGANIZED HEALTH CARE EDUCATION/TRAINING PROGRAM

## 2022-05-19 PROCEDURE — 94760 N-INVAS EAR/PLS OXIMETRY 1: CPT

## 2022-05-19 PROCEDURE — 3430000000 HC RX DIAGNOSTIC RADIOPHARMACEUTICAL: Performed by: NURSE PRACTITIONER

## 2022-05-19 PROCEDURE — 80048 BASIC METABOLIC PNL TOTAL CA: CPT

## 2022-05-19 PROCEDURE — 83735 ASSAY OF MAGNESIUM: CPT

## 2022-05-19 PROCEDURE — 36415 COLL VENOUS BLD VENIPUNCTURE: CPT

## 2022-05-19 PROCEDURE — 6370000000 HC RX 637 (ALT 250 FOR IP): Performed by: NURSE PRACTITIONER

## 2022-05-19 PROCEDURE — 78452 HT MUSCLE IMAGE SPECT MULT: CPT

## 2022-05-19 PROCEDURE — 6370000000 HC RX 637 (ALT 250 FOR IP): Performed by: INTERNAL MEDICINE

## 2022-05-19 PROCEDURE — 85520 HEPARIN ASSAY: CPT

## 2022-05-19 PROCEDURE — 6360000002 HC RX W HCPCS

## 2022-05-19 RX ORDER — METOPROLOL TARTRATE 75 MG/1
75 TABLET, FILM COATED ORAL 2 TIMES DAILY
Qty: 60 TABLET | Refills: 3 | Status: SHIPPED | OUTPATIENT
Start: 2022-05-19 | End: 2022-09-21 | Stop reason: SDUPTHER

## 2022-05-19 RX ORDER — BENAZEPRIL HYDROCHLORIDE 40 MG/1
20 TABLET, FILM COATED ORAL DAILY
Qty: 90 TABLET | Refills: 0 | Status: SHIPPED
Start: 2022-05-19 | End: 2022-06-16 | Stop reason: SDUPTHER

## 2022-05-19 RX ADMIN — ASPIRIN 325 MG: 325 TABLET, COATED ORAL at 09:28

## 2022-05-19 RX ADMIN — Medication 34.2 MILLICURIE: at 08:18

## 2022-05-19 RX ADMIN — CETIRIZINE HYDROCHLORIDE 10 MG: 10 TABLET, FILM COATED ORAL at 09:28

## 2022-05-19 RX ADMIN — METOPROLOL TARTRATE 75 MG: 50 TABLET, FILM COATED ORAL at 09:29

## 2022-05-19 RX ADMIN — APIXABAN 5 MG: 5 TABLET, FILM COATED ORAL at 14:46

## 2022-05-19 RX ADMIN — VORTIOXETINE 10 MG: 10 TABLET, FILM COATED ORAL at 09:28

## 2022-05-19 RX ADMIN — PANTOPRAZOLE SODIUM 40 MG: 40 TABLET, DELAYED RELEASE ORAL at 05:27

## 2022-05-19 RX ADMIN — Medication 10.4 MILLICURIE: at 07:21

## 2022-05-19 RX ADMIN — LISINOPRIL 40 MG: 40 TABLET ORAL at 09:28

## 2022-05-19 ASSESSMENT — PAIN SCALES - GENERAL
PAINLEVEL_OUTOF10: 0
PAINLEVEL_OUTOF10: 0

## 2022-05-19 NOTE — FLOWSHEET NOTE
[address] on [address] to check on pt's insurance with Eliquis rx and it is covered. Called Alondra from EKG for 30 day event monitor. Will administer ordered Eliquis when it comes up and d/c heparin. Pt will be discharged when these items are complete.

## 2022-05-19 NOTE — FLOWSHEET NOTE
Discharge instructions reviewed with pt. Pt will monitor BP's closely until PCP appt in one week. Verbalizes understanding of med changes, event monitor and POC. Transport to [employer] ordered.

## 2022-05-19 NOTE — PROCEDURES
30 Day Cardiac Event Monitor was applied to patient. Instructions were given and skin/monitor prep and application was demonstrated. Patient was instructed to remove monitor on 06/18/2022 and mail back to [employer].

## 2022-05-19 NOTE — DISCHARGE SUMMARY
Hospital Medicine Discharge Summary      Patient Identification:   Alvaro Fontana   : 1955  MRN: [id number]   Account: [de-identified]      Patient's PCP: ARTURO Higginbotham CNP    Admit Date: 2022     Discharge Date:   2022    Admitting Physician: Janny Ward MD     Discharging Nurse Practitioner: ARTURO Thomas CNP     Discharge Diagnoses with Assessment/Plan:  1. New onset atrial fibrillation, converted to sinus rhythm--[id number] score equals 4; appreciate cardiology input, on aspirin; started on oral anticoagulation was 5 mg twice a day for discharge, also on Lopressor, stress test not reveal ischemia and echocardiogram reveals EF 55% and no regional left ventricular wall motion abnormalities and wall thickness was within normal limits; patient remains in sinus rhythm; TSH at 3.380  2. Essential hypertension, uncontrolled--on benazepril at home dosage cut from 40 mg to 20 mg as patient was started on Lopressor 75 mg twice a day, patient has a way to monitor the blood pressure and I asked him to record to show his PCP next week, I do not want to cause a low blood pressure with the addition of the Lopressor and he may need the full 40 mg will need close monitoring  3. Diabetes mellitus type 2, controlled--Jardiance, Ozempic and Januvia; hemoglobin A1c was noted to be 6.8 on 2021  4. History of factor V Leiden mutation history of CVA in  with residual right temporal hemianopsia--on aspirin 325 mg daily  5. Depression--treated  6. Central sleep apnea due to Cheyne-Spence respirations--on CPAP~air curve 10 ASV per pulmonology note dated 2021     The patient was seen and examined on day of discharge and this discharge summary is in conjunction with any daily progress note from day of discharge.     Hospital Course:   Alvaro Fontana is a 61 y.o. male admitted to [address] [place] [address] on 2022 for rapid heart rate; per H&P dated : Fide Cooper is A 5 y. o. male with a significant PMHx of HTN, NIDDM2, Factor 5 Leiden mutation, JEANETTE, Depression, Previous CVA who presents to [facility] ED for evaluation of sudden onset of palpitations and rapid heart rate starting 830pm previous evening. Per report HR was 167 on EMS arrival. Patient states he was working in the yard and started to feel unwell. Keshawn Riser reported chest pressure and dizziness. He checked his BP which was 80/40 per report. He states he also checked his glucose as sometimes he gets these symptoms with high glucose however stated his sugar was normal. Upon arrival to the ED patient was noted to be tachycardic. Initial EKG demonstrated sinus tachycardia. Vagal maneuvers applied in the ED did slow patient HR down and cardiac monitoring showed afib with RVR. Lopressor was tried without any measurable response. Patient was then administered 20mg cardizem bolus which did control patients HR. Patient was started on heparin gtt in the ED due to [facility] score of 4. No previous history of afib. CXR with no acute process and CTA chest negative for PE. TSH WNL. Patient denies fever, chills, chest pain, SOB, abdominal pain, n/v/d/c. Patient was admitted to hospitalist service for further evaluation and management. \"     5/18--> states he worked out in the yard all day yesterday and felt great, states approximately 830 last night he felt lightheaded along with chest pressure and noticed that his blood pressure was low however his heart rate was in the 200s, EMS was called and he was brought to the emergency department, he is already been seen by cardiology today he is on a heparin drip and planning to add an amiodarone drip along with ROSA cardioversion      5/19--> converted to sinus rhythm yesterday afternoon at approximately 1544 continues to be in sinus rhythm per telemetry; stress test did not reveal ischemia and echocardiogram 55%; patient states the fluttering that he was feeling on admission and yesterday has resolved, he was started on Eliquis 5 mg twice a day and the heparin drip was stopped, he denies any lightheadedness or dizziness, he has been up walking around in the room, he denies chest pain or shortness of breath, he is eating, we discussed discharge and he is quite happy to be able to be discharged home, at this time he will be discharged home in stable condition             Exam:     Vitals:  Vitals:    05/19/22 0235 05/19/22 0352 05/19/22 0919 05/19/22 1130   BP:  113/72  133/79   Pulse:  51  65   Resp: 16 18  18   Temp:  94.7 °F (34.8 °C)  98 °F (36.7 °C)   TempSrc:  Oral Oral Oral   SpO2: 97% 98%  97%   Weight:       Height:         Weight: Weight: 174 lb 4.8 oz (79.1 kg)     24 hour intake/output:    Intake/Output Summary (Last 24 hours) at 5/19/2022 1329  Last data filed at 5/19/2022 0235  Gross per 24 hour   Intake 1680 ml   Output --   Net 1680 ml         General appearance:  No apparent distress, appears stated age and cooperative. HEENT:  Normal cephalic, atraumatic without obvious deformity. Pupils equal, round, and reactive to light. Conjunctivae/corneas clear. Neck: Supple, with full range of motion. No jugular venous distention. Trachea midline. Respiratory:  Normal respiratory effort. Clear to auscultation, bilaterally without Rales/Wheezes/Rhonchi. Cardiovascular:  Regular rate and rhythm with normal S1/S2 without murmurs, rubs or gallops. Abdomen: Soft, non-tender, non-distended with normal bowel sounds. Musculoskeletal:  No clubbing, cyanosis or edema bilaterally. Full range of motion without deformity. Skin: Skin color, texture, turgor normal.    Neurologic:  Neurovascularly intact without any focal sensory/motor deficits. Cranial nerves: II-XII intact, grossly non-focal.  Psychiatric:  Alert and oriented, thought content appropriate  Capillary Refill: Brisk,< 3 seconds   Peripheral Pulses: +2 palpable, equal bilaterally       Labs:  For convenience and continuity at follow-up the following most recent labs are provided:      CBC:    Lab Results   Component Value Date    WBC 7.2 05/19/2022    HGB 15.9 05/19/2022    HCT 47.8 05/19/2022     05/19/2022       Renal:    Lab Results   Component Value Date     05/19/2022    K 4.0 05/19/2022    K 4.6 05/17/2022     05/19/2022    CO2 25 05/19/2022    BUN 10 05/19/2022    CREATININE 0.7 05/19/2022    CALCIUM 8.8 05/19/2022       Cardiac:   Recent Labs     05/17/22 2221   TROPONINT < 0.010       Significant Diagnostic Studies    Radiology:   CTA CHEST W WO CONTRAST   Final Result   Impression:      No evidence of pulmonary embolus. This document has been electronically signed by: Miquel Contreras MD on    05/18/2022 12:14 AM      All CTs at this facility use dose modulation techniques and iterative    reconstructions, and/or weight-based dosing   when appropriate to reduce radiation to a low as reasonably achievable. XR CHEST PORTABLE   Final Result   Impression:      No acute processes. This document has been electronically signed by: Miquel Contreras MD on    05/17/2022 11:02 PM             Consults:     IP CONSULT TO CARDIOLOGY    Disposition:    [x] Home       [] TCU       [] Rehab       [] Psych       [] SNF       [] [facility]       [] Other-    Condition at Discharge: Stable    Code Status:  Full Code     Pending tests at discharge:     none     Patient Instructions:    Discharge lab work: none  Activity: activity as tolerated  Diet: ADULT DIET;  Regular; Low Fat/Low Chol/High Fiber/2 gm Na      Follow-up visits:   ARTURO Zafar - CNP  [address]  [phone]    On 5/26/2022  Follow up appointment May 26, 2022 at 1:40pm.    Sky Sanchez PA-C  [address]  [phone]    On 6/22/2022  Follow up appointment June 22, 2022 at 8:15am.         Discharge Medications:        Medication List      START taking these medications apixaban 5 MG Tabs tablet  Commonly known as: Eliquis  Take 1 tablet by mouth 2 times daily     Metoprolol Tartrate 75 MG Tabs  Take 75 mg by mouth 2 times daily        CHANGE how you take these medications    benazepril 40 MG tablet  Commonly known as: LOTENSIN  Take 0.5 tablets by mouth daily  What changed: how much to take        CONTINUE taking these medications    Accu-Chek Guide w/Device Kit     * Accu-Chek SmartView strip  Generic drug: blood glucose test strips  TEST BLOOD SUGAR ONCE A DAY     * Accu-Chek SmartView strip  Generic drug: blood glucose test strips  CHECK BLOOD SUGAR TWICE A DAY AS NEEDED FOR SYMPTOMS OF IRREGULAR BLOOD GLUCOSE     Apple Cider Vinegar Plus Tabs     aspirin 325 MG EC tablet  take 1 tablet by mouth once daily     cetirizine 10 MG tablet  Commonly known as: ZYRTEC     Jardiance 25 MG tablet  Generic drug: empagliflozin  take 1 tablet by mouth once daily     * Lancets Misc  Use qd  Dx:  250.02     * TRUEplus Lancets 33G Misc  USE TWICE A DAY     latanoprost 0.005 % ophthalmic solution  Commonly known as: XALATAN     multivitamin per tablet     NEXIUM 24HR PO     NONFORMULARY     OLIVE LEAF PO     Ozempic (1 MG/DOSE) 4 MG/3ML Sopn  Generic drug: Semaglutide (1 MG/DOSE)  INJECT 1 MG INTO THE SKIN ONCE A WEEK     Vitamin D3 50 MCG (2000 UT) Caps     VORTIoxetine 10 MG Tabs tablet  Commonly known as: Trintellix  take 1 tablet by mouth once daily         * This list has 4 medication(s) that are the same as other medications prescribed for you. Read the directions carefully, and ask your doctor or other care provider to review them with you.             STOP taking these medications    amLODIPine 10 MG tablet  Commonly known as: NORVASC     cyclobenzaprine 5 MG tablet  Commonly known as: FLEXERIL     HYDROcodone-acetaminophen 5-325 MG per tablet  Commonly known as: NORCO     Januvia 100 MG tablet  Generic drug: SITagliptin     triamcinolone 55 MCG/ACT nasal inhaler  Commonly known as: Nasacort Allergy 24HR           Where to Get Your Medications      These medications were sent to [address] - [place], [address]  [facility] [facility] [address]    Phone: [phone]   · apixaban 5 MG Tabs tablet  · Metoprolol Tartrate 75 MG Tabs     Information about where to get these medications is not yet available    Ask your nurse or doctor about these medications  · benazepril 40 MG tablet         Time Spent on discharge is more than 45 minutes in the examination, evaluation, counseling and review of medications and discharge plan. Signed: Thank you ARTURO Breaux CNP for the opportunity to be involved in this patient's care.     Electronically signed by ARTURO Gardiner CNP on 5/19/2022 at 1:29 PM

## 2022-05-19 NOTE — PROGRESS NOTES
Hospitalist Progress Note    Patient:  Kenny Cooper      Unit/Bed:8A-16/016-A    YOB: 1955    MRN: [id number]       Acct: [de-identified]     PCP: ARTURO Romero CNP    Date of Admission: 5/17/2022    Assessment/Plan:    1. New onset atrial fibrillation--[id number] score equals 4; appreciate cardiology input, on aspirin; currently on a heparin drip and will need to be transitioned over to oral anticoagulation for discharge, also on Lopressor, awaiting stress test and echocardiogram  2. Essential hypertension, uncontrolled--on lisinopril, Lopressor; monitor  3. Diabetes mellitus type 2, controlled--currently on medium dose sliding scale here, at home takes [place], Ozempic and Januvia; hemoglobin A1c was noted to be 6.8 on December 1, 2021  4. History of factor V Leiden mutation history of CVA in 2013 with residual right temporal hemianopsia--on aspirin 325 mg daily  5. Depression--treated  6. Central sleep apnea due to Cheyne-Spence respirations--on CPAP~air curve 10 ASV per pulmonology note dated November 12, 2021    Expected discharge date: Possibly today    Disposition:    [x] Home       [] TCU       [] Rehab       [] Psych       [] SNF       [] [facility]       [] Other-    Chief Complaint: rapid heart rate    Hospital Course: per H&P dated 5/18: Yao Mendez is a 61 y.o. male with a significant PMHx of HTN, NIDDM2, Factor 5 Leiden mutation, JEANETTE, Depression, Previous CVA who presents to [facility] ED for evaluation of sudden onset of palpitations and rapid heart rate starting 830pm previous evening. Per report HR was 167 on EMS arrival. Patient states he was working in the yard and started to feel unwell. He reported chest pressure and dizziness. He checked his BP which was 80/40 per report.  He states he also checked his glucose as sometimes he gets these symptoms with high glucose however stated his sugar was normal. Upon arrival to the ED patient was noted to be tachycardic. Initial EKG demonstrated sinus tachycardia. Vagal maneuvers applied in the ED did slow patient HR down and cardiac monitoring showed afib with RVR. Lopressor was tried without any measurable response. Patient was then administered 20mg cardizem bolus which did control patients HR. Patient was started on heparin gtt in the ED due to [facility] score of 4. No previous history of afib. CXR with no acute process and CTA chest negative for PE. TSH WNL. Patient denies fever, chills, chest pain, SOB, abdominal pain, n/v/d/c. Patient was admitted to hospitalist service for further evaluation and management. \"    5/18--> states he worked out in the yard all day yesterday and felt great, states approximately 830 last night he felt lightheaded along with chest pressure and noticed that his blood pressure was low however his heart rate was in the 200s, EMS was called and he was brought to the emergency department, he is already been seen by cardiology today he is on a heparin drip and planning to add an amiodarone drip along with ROSA cardioversion      5/19--> converted to sinus rhythm yesterday afternoon at approximately 1544; awaiting stress test and echocardiogram    Subjective (past 24 hours):  All symptoms have resolved, patient feels great    Medications:  Reviewed    Infusion Medications    sodium chloride      dextrose      sodium chloride 75 mL/hr at 05/18/22 0500    amiodarone 0.5 mg/min (05/18/22 1631)    heparin (PORCINE) Infusion 16 Units/kg/hr (05/18/22 2331)     Scheduled Medications    aspirin  325 mg Oral Daily    lisinopril  40 mg Oral Daily    cetirizine  10 mg Oral Daily    VORTIoxetine  10 mg Oral Daily    sodium chloride flush  10 mL IntraVENous 2 times per day    insulin lispro  0-6 Units SubCUTAneous TID WC    insulin lispro  0-3 Units SubCUTAneous Nightly    pantoprazole  40 mg Oral QAM AC    metoprolol tartrate  75 mg Oral BID     PRN Meds: HYDROcodone-acetaminophen, cyclobenzaprine, sodium chloride flush, sodium chloride, ondansetron **OR** ondansetron, polyethylene glycol, acetaminophen **OR** acetaminophen, glucose, dextrose bolus **OR** dextrose bolus, glucagon (rDNA), dextrose, heparin (porcine), heparin (porcine)      Intake/Output Summary (Last 24 hours) at 5/19/2022 0648  Last data filed at 5/19/2022 0235  Gross per 24 hour   Intake 1680 ml   Output --   Net 1680 ml       Diet:  Diet NPO Exceptions are: Sips of Water with Meds    Exam:  /72   Pulse 51   Temp 94.7 °F (34.8 °C) (Oral)   Resp 18   Ht 6' (1.829 m)   Wt 174 lb 4.8 oz (79.1 kg)   SpO2 98%   BMI 23.64 kg/m²     General appearance: No apparent distress, appears stated age and cooperative. HEENT: Pupils equal, round, and reactive to light. Conjunctivae/corneas clear. Neck: Supple, with full range of motion. No jugular venous distention. Trachea midline. Respiratory:  Normal respiratory effort. Clear to auscultation, bilaterally without Rales/Wheezes/Rhonchi. Cardiovascular: regular rate and rhythm   Abdomen: Soft, non-tender, non-distended with normal bowel sounds. Musculoskeletal: passive and active ROM x 4 extremities. Skin: Skin color, texture, turgor normal.    Neurologic:  Neurovascularly intact without any focal sensory/motor deficits.  Cranial nerves: II-XII intact, grossly non-focal.  Psychiatric: Alert and oriented, thought content appropriate  Capillary Refill: Brisk,< 3 seconds   Peripheral Pulses: +2 palpable, equal bilaterally     Labs:   Recent Labs     05/17/22 2221 05/18/22  0728 05/19/22  0601   WBC 11.6* 9.0 7.2   HGB 17.4 15.3 15.9   HCT 52.2* 46.3 47.8    219 220     Recent Labs     05/17/22 2221 05/18/22  0728 05/19/22  0601    140 140   K 4.6 4.0 4.0    106 104   CO2 24 22* 25   BUN 11 9 10   CREATININE 1.0 0.6 0.7   CALCIUM 10.1 8.3* 8.8     Recent Labs     05/17/22 2221   INR 0.92     Microbiology:    COVID (-)    Radiology:  CTA CHEST Shari Alexander CONTRAST    Result Date: 5/18/2022  CT angiogram chest/pulmonary arteries with contrast Multiplanar reconstructions and MIPS Comparison: None Findings: No filling defects are noted to suggest pulmonary embolus. Main pulmonary artery normal in caliber. No significant focal parenchymal abnormalities. No significant mediastinal or hilar adenopathy. No free pleural fluid noted. No acute bony abnormalities. Lower cervical fusion hardware partially visualized. Thoracic aorta normal caliber without dissection. Heart size normal. Great vessel origins patent. No coronary calcifications. Impression: No evidence of pulmonary embolus. This document has been electronically signed by: Gabi Pascal MD on 05/18/2022 12:14 AM All CTs at this facility use dose modulation techniques and iterative reconstructions, and/or weight-based dosing when appropriate to reduce radiation to a low as reasonably achievable. XR CHEST PORTABLE    Result Date: 5/17/2022  1 view chest x-ray. Comparison: 8/9/2012 Findings: Lungs are clear without acute infiltrates. No pneumothorax. Heart size normal. No acute bony abnormalities. Cervical fusion hardware. Impression: No acute processes.  This document has been electronically signed by: Gabi Pascal MD on 05/17/2022 11:02 PM      DVT prophylaxis: [] Lovenox                                 [] SCDs                                 [x] Heparin gtt                                 [] Encourage ambulation           [] Already on Anticoagulation     Code Status: Full Code    PT/OT Eval Status: monitor    Tele:   [x] yes sinus rhythm heart rate 60             [] no    Active Hospital Problems    Diagnosis Date Noted    New onset atrial fibrillation [facility]) [I48.91] 05/18/2022     Priority: Medium       Electronically signed by ARTURO Garcia CNP on 5/19/2022 at 6:48 AM

## 2022-05-19 NOTE — CARE COORDINATION
5/19/22, 7:45 AM EDT  DISCHARGE PLANNING EVALUATION:    ALEXA Gaviria 1       Admitted: 5/17/2022/ [facility] day: 1   Location: [facility]/016-A Reason for admit: New onset atrial fibrillation ([address] [I48.91]  Atrial fibrillation with RVR ([address] [I48.91]   PMH:  has a past medical history of BPH (benign prostatic hyperplasia), Depression, Diabetes mellitus ([address] Factor 5 Leiden mutation, heterozygous ([address] Heterozygous MTHFR mutation [id number], Hyperglycemia, Hypertension, Hypertension, Osteoarthritis, Sleep apnea, Stroke ([facility] Type II or unspecified type diabetes mellitus without mention of complication, not stated as uncontrolled, and Visual field cut. Procedure: 5/17 CXR and CTA Chest both negative  5/18 Stress test: not suggestive for ischemia  5/18 ECHO:  Normal left ventricle size and systolic function. Ejection fraction was   estimated at 55%. There were no regional left ventricular wall motion   abnormalities and wall thickness was within normal limits. Barriers to Discharge:  Admitted through ER with palpitations and rapid heart rate. Found to be in new onset Afib. Heparin gtt transitioned to Eliquis and 325 mg ASA. PO Lopressor 75 mg BID. Cardiology consult. ECHO & Stress completed. Plan for 30 day event monitor at discharge. PCP: ARTURO Colvin CNP  Readmission Risk Score: 11.4 ( )%  Patient's Healthcare Decision Maker: Legal Next of Kin    Patient Goals/Plan/Treatment Preferences: Bruce is from home with his with Sherre Lesches. He has a Bipap, denies any other equipment or home care. Denies any needs for home. Transportation/Food Security/Housekeeping Addressed:  No issues identified. 5/19/22, 2:34 PM EDT    Patient goals/plan/ treatment preferences discussed by  and . Patient goals/plan/ treatment preferences reviewed with patient/ family.   Patient/ family verbalize understanding of discharge plan and are in agreement with goal/plan/treatment preferences. Understanding was demonstrated using the teach back method. AVS provided by RN at time of discharge, which includes all necessary medical information pertaining to the patients current course of illness, treatment, post-discharge goals of care, and treatment preferences.      Services At/After Discharge: None

## 2022-05-19 NOTE — PROGRESS NOTES
Cardiology Progress Note      Patient:  Dickson Dupree  YOB: 1955  MRN: [id number]   Acct: [de-identified]  Admit Date:  5/17/2022  Primary Cardiologist: Sandee Walker MD    Note per dr Famiila Berman:    New onset AFib < 48hrs. Evaluate for potential elective ccardioversion      CHIEF COMPLAINT:    Palpitations      HISTORY OF PRESENT ILLNESS:    Dickson Dupree is a pleasant 61year old male patient with past medical history that includes:   Past Medical History        Past Medical History:   Diagnosis Date    BPH (benign prostatic hyperplasia)      Depression      [facility] mellitus ([facility]      Factor 5 Leiden mutation, heterozygous ([facility]      Heterozygous MTHFR mutation [id number]      Hyperglycemia      Hypertension      Hypertension      Osteoarthritis      Sleep apnea      Stroke ([facility] 8/8/2013    Type II or unspecified type diabetes mellitus without mention of complication, not stated as uncontrolled 8/2012    Visual field cut 8/8/2013     Right visual field cut      Echo in 2019 revealed an EF of 60%. The patient was admitted to the hospital on 5/17/2022 after he presented with palpitations. He was found to be tachycardic in ER, . Patient reports having low BP at home, 80/40. Patient was reported to have A Fib in ER. EKG on 5/17 was c/w sinus tachycardia, . Telemetry is c/w atrial fibrillation, HR in 130s. Chest CTA was negative for PE. Troponin was <0.01. Patient states that last night after he did some yard work, he started having chest pain, described as pressure, left sided, not radiating, up to 4/10 in severity, gradually resolved, associated with palpations. Patient states that chest pain occurs mostly when he has the palpitations. He reports dyspnea on exertion, gets tired easily climbing stairs. Patient denies orthopnea, paroxysmal nocturnal dyspnea, dizziness, syncope, recent weight gain or leg swelling. He vapes, has prior h/o cigarette smoking. \"    Subjective (Events in last 24 hours): pt awake and alert. NAD  No cp or sob.  No complaints  On RA      Objective:   /72   Pulse 51   Temp 94.7 °F (34.8 °C) (Oral)   Resp 18   Ht 6' (1.829 m)   Wt 174 lb 4.8 oz (79.1 kg)   SpO2 98%   BMI 23.64 kg/m²        TELEMETRY: nsr 68    Physical Exam:  General Appearance: alert and oriented to person, place and time, in no acute distress  Cardiovascular: normal rate, regular rhythm, normal S1 and S2, no murmurs, rubs, clicks, or gallops, distal pulses intact, no carotid bruits, no JVD  Pulmonary/Chest: clear to auscultation bilaterally- no wheezes, rales or rhonchi, normal air movement, no respiratory distress  Abdomen: soft, non-tender, non-distended, normal bowel sounds, no masses Extremities: no cyanosis, clubbing or edema, pulse   Skin: warm and dry  Head: normocephalic and atraumatic  Eyes: pupils equal, round, and reactive to light  Neck: supple and non-tender without mass, no thyromegaly   Neurological: alert, oriented, normal speech, no focal findings or movement disorder noted    Medications:    aspirin  325 mg Oral Daily    lisinopril  40 mg Oral Daily    cetirizine  10 mg Oral Daily    VORTIoxetine  10 mg Oral Daily    sodium chloride flush  10 mL IntraVENous 2 times per day    insulin lispro  0-6 Units SubCUTAneous TID WC    insulin lispro  0-3 Units SubCUTAneous Nightly    pantoprazole  40 mg Oral QAM AC    metoprolol tartrate  75 mg Oral BID      sodium chloride      dextrose      sodium chloride 75 mL/hr at 05/18/22 0500    amiodarone 0.5 mg/min (05/18/22 1631)    heparin (PORCINE) Infusion 16 Units/kg/hr (05/18/22 2331)     HYDROcodone-acetaminophen, 1 tablet, Q6H PRN  cyclobenzaprine, 5 mg, TID PRN  sodium chloride flush, 10 mL, PRN  sodium chloride, , PRN  ondansetron, 4 mg, Q8H PRN   Or  ondansetron, 4 mg, Q6H PRN  polyethylene glycol, 17 g, Daily PRN  acetaminophen, 650 mg, Q6H PRN   Or  acetaminophen, 650 mg, Q6H PRN  glucose, 4 tablet, PRN  dextrose bolus, 125 mL, PRN   Or  dextrose bolus, 250 mL, PRN  glucagon (rDNA), 1 mg, PRN  dextrose, 100 mL/hr, PRN  heparin (porcine), 4,000 Units, PRN  heparin (porcine), 2,000 Units, PRN        Lab Data:    Cardiac Enzymes:  No results for input(s): CKTOTAL, CKMB, CKMBINDEX, TROPONINI in the last 72 hours.     CBC:   Lab Results   Component Value Date    WBC 7.2 05/19/2022    RBC 5.03 05/19/2022    RBC 5.06 01/08/2021    HGB 15.9 05/19/2022    HCT 47.8 05/19/2022     05/19/2022       CMP:    Lab Results   Component Value Date     05/19/2022    K 4.0 05/19/2022    K 4.6 05/17/2022     05/19/2022    CO2 25 05/19/2022    BUN 10 05/19/2022    CREATININE 0.7 05/19/2022    AGRATIO 2.0 05/08/2015    LABGLOM >90 05/19/2022    GLUCOSE 128 05/19/2022    GLUCOSE 148 05/21/2021    CALCIUM 8.8 05/19/2022       Hepatic Function Panel:    Lab Results   Component Value Date    ALKPHOS 84 01/08/2021    ALKPHOS 55 05/08/2015    ALT 24 01/08/2021    AST 16 01/08/2021    PROT 6.7 01/08/2021    BILITOT 0.5 01/08/2021    BILIDIR 0.2 08/08/2013    LABALBU 4.4 01/08/2021       Magnesium:    Lab Results   Component Value Date    MG 1.9 05/19/2022       PT/INR:    Lab Results   Component Value Date    INR 0.92 05/17/2022       HgBA1c:    Lab Results   Component Value Date    LABA1C 6.8 12/01/2021       FLP:    Lab Results   Component Value Date    TRIG 49 09/18/2020    HDL 47 09/18/2020    HDL 42 03/01/2012    LDLCALC 130 09/18/2020    LDLDIRECT 99 05/08/2015    LABVLDL 10 09/18/2020       TSH:    Lab Results   Component Value Date    TSH 3.380 05/17/2022         Assessment:    New onset afib rvr of unknown duration   Converted to NSR with amio gtt   chadsvasc = 5  Chest pain -trop neg x1- resolved  HTN  DM  Hx CVA  Factor V leiden deficiency       Plan:    Keep mag >2 and k >4  Normal TSH  Cont BB/ace  Ok to keep home norvasc off due to lower bp   Dr Friend Diss discussed with family - they do not want amio at this time  Asa per neurology/hematology recommendation  recommend [address] upon dc - pt understands risk of bleeding and accepts risk to reduce risk of embolic phenemenon - start eliquis 5 bid upon dc  30 day event monitor upon dc  If TTE and stress WNL, will see prn  F/up dr Yaya Harris 5 weeks       Electronically signed by Samantha Romberg, PA-C on 5/19/2022 at 10:03 AM

## 2022-05-20 ENCOUNTER — TELEPHONE (OUTPATIENT)
Dept: FAMILY MEDICINE CLINIC | Age: 67
End: 2022-05-20

## 2022-05-20 NOTE — TELEPHONE ENCOUNTER
[facility] Transitions Initial Follow Up Call    Call within 2 business days of discharge: Yes     Patient: Shira Carrera Patient : 1955   MRN: [id number]  Reason for Admission: A-fib  Discharge Date: 22 RARS: Readmission Risk Score: 10.2 ( )       Spoke with: Patient    Discharge department/facility: [facility]    Non-face-to-face services provided:  Scheduled appointment with PCP-2022    Follow Up  Future Appointments   Date Time Provider Elpidio Morfin   2022  1:40 PM ARTURO Flores - CNP [facility] [address]   2022  8:15 AM DONAL Alcala [facility] [address]   2022  8:15 AM Toyin Lazaro   2022 11:00 AM Taylor eBrnard MD [address] [place] [address]

## 2022-06-09 ENCOUNTER — OFFICE VISIT (OUTPATIENT)
Dept: FAMILY MEDICINE CLINIC | Age: 67
End: 2022-06-09
Payer: COMMERCIAL

## 2022-06-09 VITALS
TEMPERATURE: 97.9 F | HEART RATE: 72 BPM | WEIGHT: 175 LBS | OXYGEN SATURATION: 99 % | DIASTOLIC BLOOD PRESSURE: 80 MMHG | RESPIRATION RATE: 20 BRPM | SYSTOLIC BLOOD PRESSURE: 140 MMHG | BODY MASS INDEX: 23.19 KG/M2 | HEIGHT: 73 IN

## 2022-06-09 DIAGNOSIS — G89.29 CHRONIC RIGHT SHOULDER PAIN: ICD-10-CM

## 2022-06-09 DIAGNOSIS — I48.91 ATRIAL FIBRILLATION WITH RVR (HCC): ICD-10-CM

## 2022-06-09 DIAGNOSIS — M25.511 CHRONIC RIGHT SHOULDER PAIN: ICD-10-CM

## 2022-06-09 DIAGNOSIS — E11.9 DM TYPE 2, GOAL HBA1C < 7% (HCC): ICD-10-CM

## 2022-06-09 DIAGNOSIS — Z09 HOSPITAL DISCHARGE FOLLOW-UP: ICD-10-CM

## 2022-06-09 DIAGNOSIS — I48.91 ATRIAL FIBRILLATION, NEW ONSET (HCC): Primary | ICD-10-CM

## 2022-06-09 DIAGNOSIS — I10 ESSENTIAL HYPERTENSION: ICD-10-CM

## 2022-06-09 PROCEDURE — 1111F DSCHRG MED/CURRENT MED MERGE: CPT | Performed by: NURSE PRACTITIONER

## 2022-06-09 PROCEDURE — 99214 OFFICE O/P EST MOD 30 MIN: CPT | Performed by: NURSE PRACTITIONER

## 2022-06-09 RX ORDER — TRAMADOL HYDROCHLORIDE 50 MG/1
50 TABLET ORAL EVERY 6 HOURS PRN
Qty: 90 TABLET | Refills: 0 | Status: SHIPPED | OUTPATIENT
Start: 2022-06-09 | End: 2022-07-09

## 2022-06-09 ASSESSMENT — PATIENT HEALTH QUESTIONNAIRE - PHQ9
SUM OF ALL RESPONSES TO PHQ9 QUESTIONS 1 & 2: 0
3. TROUBLE FALLING OR STAYING ASLEEP: 0
7. TROUBLE CONCENTRATING ON THINGS, SUCH AS READING THE NEWSPAPER OR WATCHING TELEVISION: 0
9. THOUGHTS THAT YOU WOULD BE BETTER OFF DEAD, OR OF HURTING YOURSELF: 0
5. POOR APPETITE OR OVEREATING: 0
SUM OF ALL RESPONSES TO PHQ QUESTIONS 1-9: 0
4. FEELING TIRED OR HAVING LITTLE ENERGY: 0
SUM OF ALL RESPONSES TO PHQ QUESTIONS 1-9: 0
10. IF YOU CHECKED OFF ANY PROBLEMS, HOW DIFFICULT HAVE THESE PROBLEMS MADE IT FOR YOU TO DO YOUR WORK, TAKE CARE OF THINGS AT HOME, OR GET ALONG WITH OTHER PEOPLE: 0
SUM OF ALL RESPONSES TO PHQ QUESTIONS 1-9: 0
1. LITTLE INTEREST OR PLEASURE IN DOING THINGS: 0
SUM OF ALL RESPONSES TO PHQ QUESTIONS 1-9: 0
2. FEELING DOWN, DEPRESSED OR HOPELESS: 0
6. FEELING BAD ABOUT YOURSELF - OR THAT YOU ARE A FAILURE OR HAVE LET YOURSELF OR YOUR FAMILY DOWN: 0
8. MOVING OR SPEAKING SO SLOWLY THAT OTHER PEOPLE COULD HAVE NOTICED. OR THE OPPOSITE, BEING SO FIGETY OR RESTLESS THAT YOU HAVE BEEN MOVING AROUND A LOT MORE THAN USUAL: 0

## 2022-06-09 NOTE — PATIENT INSTRUCTIONS
Patient Education        Learning About Atrial Fibrillation  What is atrial fibrillation? Atrial fibrillation (say \"AY-tree-remigio ezv-wtck-WGA-shun\") is a common type of irregular heartbeat (arrhythmia). Normally, the heart beats in a strong, steady rhythm. In atrial fibrillation, a problem with the heart's electrical system causes the two upper chambers of the heart (called the atria) to quiver, orfibrillate. Atrial fibrillation can be dangerous. This is because if the heartbeat isn't strong and steady, blood can collect, or pool, in the atria. And pooled blood is more likely to form clots. Clots can travel to the brain, block blood flow,and cause a stroke. Atrial fibrillation can also lead to heart failure. This condition also upsets the normal rhythm between the atria and the lower chambers of the heart. (These chambers are called the ventricles.) Theventricles may beat fast and without a regular rhythm. What are the symptoms? Some people feel symptoms when they have episodes of atrial fibrillation. Butother people don't notice any symptoms. If you have symptoms, you may feel:   A fluttering, racing, or pounding feeling in your chest called palpitations.  Weak or tired.  Dizzy or lightheaded.  Short of breath.  Chest pain.  Confused. You may notice signs of atrial fibrillation when you check your pulse. Yourpulse may seem uneven or fast.  What can you expect when you have atrial fibrillation? At first, spells of atrial fibrillation may come on suddenly and last a short time. They may go away on their own or with treatment. Over time, the spellsmay last longer and occur more often. They often don't go away on their own. How is it treated? Treatments can help you feel better and prevent future problems, especiallystroke and heart failure. Your treatment will depend on the cause of your atrial fibrillation, yoursymptoms, and your risk for stroke.  Types of treatment include:   Heart rate treatment. Medicine may be used to slow your heart rate. Your heartbeat may still be irregular. But these medicines keep your heart from beating too fast. They may also help relieve symptoms.  Heart rhythm treatment. Different treatments may be used to try to stop atrial fibrillation and keep it from returning. They can also relieve symptoms. These treatments include medicine, electrical cardioversion to shock the heart back to a normal rhythm, a procedure called catheter ablation, and heart surgery.  Stroke prevention. You and your doctor can decide how to lower your risk. You may decide to take a blood-thinning medicine called an anticoagulant. What is a heart-healthy lifestyle for atrial fibrillation? You can live well and help manage atrial fibrillation by having a heart-healthy lifestyle. This lifestyle may help reduce how often you have episodes of atrialfibrillation. Don't smoke. Avoid secondhand smoke too. Quitting smoking is the best thing you can do for your heart. Be active. Talk to your doctor about what type and level of exercise is safe for you. Eat a heart-healthy diet. These foods include vegetables, fruits, nuts, beans, lean meat, fish, and wholegrains. Limit sodium, alcohol, and sugar. Avoid alcohol if it triggers symptoms. Stay at a healthy weight. Lose weight if you need to. Losing weight can help relieve symptoms. Manage other health problems. These problems include diabetes, high blood pressure, and high cholesterol. If you think you may have a problem with alcohol or drug use, talk to your doctor. Manage stress. Options like yoga, biofeedback, and meditation may help. Where can you learn more? Go to https://philly.[employer]. org and sign in to your "[employer] account. Enter C497 in the [employer] box to learn more about \"Learning About Atrial Fibrillation. \"     If you do not have an account, please click on the \"Sign Up Now\" link.   Current as of: January 10, 2022               Content Version: 13.2  © 4735-3255 Healthwise, Incorporated. Care instructions adapted under license by [facility] ([facility]). If you have questions about a medical condition or this instruction, always ask your healthcare professional. [address] any warranty or liability for your use of this information.

## 2022-06-13 ASSESSMENT — ENCOUNTER SYMPTOMS
WHEEZING: 0
SHORTNESS OF BREATH: 0
COUGH: 0
ABDOMINAL PAIN: 0
BACK PAIN: 0
CHEST TIGHTNESS: 0
ABDOMINAL DISTENTION: 0

## 2022-06-13 NOTE — PROGRESS NOTES
[address] [place] [address]  Dept: [phone]  Dept Fax: [phone]  Loc: [phone]  PROGRESS NOTE      VisitDate: 6/9/2022    Mei Pardo is a 74 y.o. male who presents today for:     Chief Complaint   Patient presents with    Follow[facility]Up from [facility]     STR 5/17-5/19/22 A-Fib with RVR. Started on Eliquis, Metoprolol Tart and Benazepril dose was lowered. Subjective:  Hospital follow-up discharge 5/19/2022 A. fib with RVR. Placed on Eliquis and metoprolol . Lotensin dosage adjusted/lowered. Taking medications as prescribed. Denies any additional episodes of heart palpitations, heart fluttering dizziness syncope. Denies chest pain or shortness of breath. Patient reports he was tentatively scheduled for shoulder surgery due to chronic right shoulder pain and rotator cuff tear next month. Requesting possible pain medication. Review of Systems   Constitutional: Negative for activity change, appetite change, chills, fatigue and fever. Eyes: Negative for visual disturbance. Respiratory: Negative for cough, chest tightness, shortness of breath and wheezing. Cardiovascular: Negative for chest pain, palpitations and leg swelling. Gastrointestinal: Negative for abdominal distention and abdominal pain. Genitourinary: Negative for dysuria. Musculoskeletal: Negative for arthralgias, back pain and neck pain. Skin: Negative. Negative for rash. Neurological: Negative for dizziness, light-headedness and headaches. Hematological: Negative for adenopathy. Psychiatric/Behavioral: Negative for decreased concentration and dysphoric mood. All other systems reviewed and are negative.     Past Medical History:   Diagnosis Date    BPH (benign prostatic hyperplasia)     Depression     Diabetes mellitus (HCC)     Factor 5 Leiden mutation, heterozygous (HCC)     Heterozygous MTHFR mutation [id number]     Hyperglycemia     Hypertension     Hypertension     Osteoarthritis     Sleep apnea     Stroke [facility]) 2013    Type II or unspecified type diabetes mellitus without mention of complication, not stated as uncontrolled 2012    Visual field cut 2013    Right visual field cut      Past Surgical History:   Procedure Laterality Date    ABDOMEN SURGERY      APPENDECTOMY      CERVICAL FUSION  08    COLONOSCOPY  12    [address]     TONSILLECTOMY      UMBILICAL HERNIA REPAIR  2012  Dr Lady Paredes     Family History   Problem Relation Age of Onset    Diabetes Father     Diabetes Brother     Stroke Brother      Social History     Tobacco Use    Smoking status: Former Smoker     Packs/day: 0.50     Years: 40.00     Pack years: 20.00     Types: E-Cigarettes     Quit date: 2013     Years since quittin.8    Smokeless tobacco: Never Used    Tobacco comment: States he uses Vap   Substance Use Topics    Alcohol use: Yes     Alcohol/week: 0.0 standard drinks     Comment: social      Current Outpatient Medications   Medication Sig Dispense Refill    traMADol (ULTRAM) 50 MG tablet Take 1 tablet by mouth every 6 hours as needed for Pain for up to 30 days.  Take lowest dose possible to manage pain 90 tablet 0    metoprolol tartrate 75 MG TABS Take 75 mg by mouth 2 times daily 60 tablet 3    apixaban (ELIQUIS) 5 MG TABS tablet Take 1 tablet by mouth 2 times daily 60 tablet 1    benazepril (LOTENSIN) 40 MG tablet Take 0.5 tablets by mouth daily 90 tablet 0    JARDIANCE 25 MG tablet take 1 tablet by mouth once daily 30 tablet 2    OZEMPIC, 1 MG/DOSE, 4 MG/3ML SOPN INJECT 1 MG INTO THE SKIN ONCE A WEEK 3 mL 5    VORTIoxetine (TRINTELLIX) 10 MG TABS tablet take 1 tablet by mouth once daily 90 tablet 2    aspirin 325 MG EC tablet take 1 tablet by mouth once daily 90 tablet 3    Blood Glucose Monitoring Suppl (ACCU-CHEK GUIDE) w/Device KIT CHECK BLOOD SUGAR DAILY      TRUEplus Lancets 33G MISC USE TWICE A  each 3    blood glucose test strips (ACCU-CHEK SMARTVIEW) strip CHECK BLOOD SUGAR TWICE A DAY AS NEEDED FOR SYMPTOMS OF IRREGULAR BLOOD GLUCOSE 100 strip 3    Apple Sudhir Vn-Grn Tea-Bit Or-Cr (APPLE CIDER VINEGAR PLUS) TABS Take by mouth      ACCU-CHEK SMARTVIEW strip TEST BLOOD SUGAR ONCE A DAY 50 strip 11    Cholecalciferol (VITAMIN D3) 2000 units CAPS Take by mouth daily       latanoprost (XALATAN) 0.005 % ophthalmic solution Place 1 drop into both eyes daily       Esomeprazole Magnesium (NEXIUM 24HR PO) Take by mouth daily       OLIVE LEAF PO Take by mouth       NONFORMULARY daily PROSTATE SUPPORT      multivitamin (THERAGRAN) per tablet Take 1 tablet by mouth daily.  Lancets MISC Use qd  Dx:  250.02 50 each 11    cetirizine (ZYRTEC) 10 MG tablet Take 10 mg by mouth daily. No current facility-administered medications for this visit. No Known Allergies  Health Maintenance   Topic Date Due    HIV screen  Never done    DTaP/Tdap/Td vaccine (1 - Tdap) Never done    Shingles vaccine (1 of 2) Never done    Low dose CT lung screening  Never done    Pneumococcal 65+ years Vaccine (2 - PPSV23 or PCV20) 08/16/2020    Diabetic retinal exam  02/21/2021    Lipids  09/18/2021    Prostate Specific Antigen (PSA) Screening or Monitoring  01/08/2022    Diabetic microalbuminuria test  05/21/2022    AAA screen  05/21/2022    Diabetic foot exam  12/01/2022    A1C test (Diabetic or Prediabetic)  12/01/2022    Depression Monitoring  06/09/2023    Colorectal Cancer Screen  09/08/2027    Flu vaccine  Completed    COVID-19 Vaccine  Completed    Hepatitis C screen  Completed    Hepatitis A vaccine  Aged Out    Hib vaccine  Aged Out    Meningococcal (ACWY) vaccine  Aged Out         Objective:     Physical Exam  Vitals and nursing note reviewed. Constitutional:       Appearance: Normal appearance. He is well-developed. He is not diaphoretic.    HENT:      Head: Normocephalic and atraumatic. Not macrocephalic and not microcephalic. Right Ear: Hearing, tympanic membrane, ear canal and external ear normal. No drainage or tenderness. No middle ear effusion. No hemotympanum. Tympanic membrane is not injected, scarred, perforated or bulging. Left Ear: Hearing, tympanic membrane, ear canal and external ear normal. No drainage or tenderness. No middle ear effusion. No hemotympanum. Tympanic membrane is not injected, scarred, perforated or bulging. Nose: No nasal deformity, septal deviation, mucosal edema or rhinorrhea. Right Sinus: No maxillary sinus tenderness or frontal sinus tenderness. Left Sinus: No maxillary sinus tenderness or frontal sinus tenderness. Mouth/Throat:      Mouth: No oral lesions. Dentition: Normal dentition. Does not have dentures. No dental caries or dental abscesses. Pharynx: No oropharyngeal exudate or posterior oropharyngeal erythema. Tonsils: No tonsillar abscesses. Eyes:      General: Lids are normal. No scleral icterus. Extraocular Movements:      Right eye: Normal extraocular motion. Left eye: Normal extraocular motion. Conjunctiva/sclera: Conjunctivae normal.      Right eye: Right conjunctiva is not injected. Left eye: Left conjunctiva is not injected. Neck:      Thyroid: No thyroid mass or thyromegaly. Vascular: No carotid bruit or JVD. Trachea: Trachea normal.   Cardiovascular:      Rate and Rhythm: Normal rate and regular rhythm. Heart sounds: Normal heart sounds, S1 normal and S2 normal. No murmur heard. No friction rub. No gallop. Pulmonary:      Effort: Pulmonary effort is normal. No respiratory distress. Breath sounds: Normal breath sounds. No wheezing, rhonchi or rales. Chest:      Chest wall: No tenderness. Breasts:      Right: No supraclavicular adenopathy. Left: No supraclavicular adenopathy.        Abdominal:      General: Bowel sounds are normal.      Palpations: Abdomen is soft. There is no hepatomegaly, splenomegaly or mass. Tenderness: There is no guarding or rebound. Hernia: No hernia is present. There is no hernia in the ventral area or left inguinal area. Musculoskeletal:         General: No tenderness. Normal range of motion. Cervical back: Normal range of motion and neck supple. No edema or erythema. Normal range of motion. Lymphadenopathy:      Head:      Right side of head: No submental, submandibular, tonsillar, preauricular, posterior auricular or occipital adenopathy. Left side of head: No submental, submandibular, tonsillar, preauricular, posterior auricular or occipital adenopathy. Cervical: No cervical adenopathy. Right cervical: No superficial, deep or posterior cervical adenopathy. Left cervical: No superficial, deep or posterior cervical adenopathy. Upper Body:      Right upper body: No supraclavicular or pectoral adenopathy. Left upper body: No supraclavicular or pectoral adenopathy. Skin:     General: Skin is warm and dry. Coloration: Skin is not pale. Findings: No bruising, ecchymosis, laceration, lesion or rash. Nails: There is no clubbing. Neurological:      Mental Status: He is alert and oriented to person, place, and time. Cranial Nerves: No cranial nerve deficit. Motor: No abnormal muscle tone. Coordination: Coordination normal.      Deep Tendon Reflexes: Reflexes normal.   Psychiatric:         Speech: Speech normal.         Behavior: Behavior normal.         Thought Content: Thought content normal.         Judgment: Judgment normal.       BP (!) 140/80 (Site: Right Upper Arm)   Pulse 72   Temp 97.9 °F (36.6 °C) (Oral)   Resp 20   Ht 6' 1\" (1.854 m)   Wt 175 lb (79.4 kg)   SpO2 99%   BMI 23.09 kg/m²       Impression/Plan:  1. Atrial fibrillation, new onset ([address]    2. Atrial fibrillation with RVR ([address]    3.  DM type 2, goal HbA1c < 7% ([facility]    4. Essential hypertension    5. Chronic right shoulder pain      Requested Prescriptions     Signed Prescriptions Disp Refills    traMADol (ULTRAM) 50 MG tablet 90 tablet 0     Sig: Take 1 tablet by mouth every 6 hours as needed for Pain for up to 30 days. Take lowest dose possible to manage pain     No orders of the defined types were placed in this encounter. Patient giveneducational materials - see patient instructions. Discussed use, benefit, and side effects of prescribed medications. All patient questions answered. Pt voiced understanding. Reviewed health maintenance. Patient agreedwith treatment plan. Follow up as directed. Tramadol 50 mg 1 every 6 as needed #90. OARRS report reviewed. Consult with cardiology as scheduled. Continue current meds. Continue medications as prescribed.  Educational information on above diagnosis  provided per AVS.         Electronically signed by ARTURO Colvin CNP on 6/13/2022 at 11:19 AM

## 2022-06-15 ENCOUNTER — PATIENT MESSAGE (OUTPATIENT)
Dept: FAMILY MEDICINE CLINIC | Age: 67
End: 2022-06-15

## 2022-06-15 NOTE — TELEPHONE ENCOUNTER
From: Bruce Cooper  To: Willow Breen  Sent: 6/15/2022 9:11 AM EDT  Subject: BP high    Good morning:  I am on vacation and going to [place] today. All day yesterday by bottom number was 92. During the day the top number was around 150 to 160. Last night it was 172 over 92. This morning it is 167 over 92. Please advise.     Thank you,  Armin Clancy

## 2022-06-16 RX ORDER — BENAZEPRIL HYDROCHLORIDE 40 MG/1
40 TABLET, FILM COATED ORAL DAILY
Qty: 90 TABLET | Refills: 0 | Status: SHIPPED | OUTPATIENT
Start: 2022-06-16 | End: 2022-10-17

## 2022-06-20 NOTE — PROGRESS NOTES
metoprolol tartrate 75 MG TABS Take 75 mg by mouth 2 times daily 60 tablet 3    apixaban (ELIQUIS) 5 MG TABS tablet Take 1 tablet by mouth 2 times daily 60 tablet 1    JARDIANCE 25 MG tablet take 1 tablet by mouth once daily 30 tablet 2    OZEMPIC, 1 MG/DOSE, 4 MG/3ML SOPN INJECT 1 MG INTO THE SKIN ONCE A WEEK 3 mL 5    VORTIoxetine (TRINTELLIX) 10 MG TABS tablet take 1 tablet by mouth once daily 90 tablet 2    aspirin 325 MG EC tablet take 1 tablet by mouth once daily 90 tablet 3    Blood Glucose Monitoring Suppl (ACCU-CHEK GUIDE) w/Device KIT CHECK BLOOD SUGAR DAILY      TRUEplus Lancets 33G MISC USE TWICE A  each 3    blood glucose test strips (ACCU-CHEK SMARTVIEW) strip CHECK BLOOD SUGAR TWICE A DAY AS NEEDED FOR SYMPTOMS OF IRREGULAR BLOOD GLUCOSE 100 strip 3    Apple Sudhir Vn-Grn Tea-Bit Or-Cr (APPLE CIDER VINEGAR PLUS) TABS Take by mouth      ACCU-CHEK SMARTVIEW strip TEST BLOOD SUGAR ONCE A DAY 50 strip 11    Cholecalciferol (VITAMIN D3) 2000 units CAPS Take by mouth daily       latanoprost (XALATAN) 0.005 % ophthalmic solution Place 1 drop into both eyes daily       Esomeprazole Magnesium (NEXIUM 24HR PO) Take by mouth daily       OLIVE LEAF PO Take by mouth       NONFORMULARY daily PROSTATE SUPPORT      multivitamin (THERAGRAN) per tablet Take 1 tablet by mouth daily.  Lancets MISC Use qd  Dx:  250.02 50 each 11    cetirizine (ZYRTEC) 10 MG tablet Take 10 mg by mouth daily. No current facility-administered medications for this visit.        Social History     Socioeconomic History    Marital status:      Spouse name: Angelica Smyth Number of children: 3    Years of education: 15    Highest education level: None   Occupational History    Occupation: [employer]     Employer: [employer]   Tobacco Use    Smoking status: Former Smoker     Packs/day: 0.50     Years: 40.00     Pack years: 20.00     Types: E-Cigarettes     Quit date: 2013     Years since quittin.8  Smokeless tobacco: Never Used    Tobacco comment: States he uses Vap   Vaping Use    Vaping Use: Former   Substance and Sexual Activity    Alcohol use: Yes     Alcohol/week: 0.0 standard drinks     Comment: social    Drug use: No    Sexual activity: Yes     Partners: Female   Other Topics Concern    None   Social History Narrative    None     Social Determinants of Health     Financial Resource Strain: Low Risk     Difficulty of Paying Living Expenses: Not hard at all   Food Insecurity: No Food Insecurity    Worried About Running Out of Food in the Last Year: Never true    [address] in the Last Year: Never true   Transportation Needs:     Lack of Transportation (Medical): Not on file    Lack of Transportation (Non-Medical): Not on file   Physical Activity:     Days of Exercise per Week: Not on file    Minutes of Exercise per Session: Not on file   Stress:     Feeling of Stress : Not on file   Social Connections:     Frequency of Communication with Friends and Family: Not on file    Frequency of Social Gatherings with Friends and Family: Not on file    Attends Islam Services: Not on file    Active Member of [address] [place] [address] or Organizations: Not on file    Attends Club or Organization Meetings: Not on file    Marital Status: Not on file   Intimate Partner Violence:     Fear of Current or Ex-Partner: Not on file    Emotionally Abused: Not on file    Physically Abused: Not on file    Sexually Abused: Not on file   Housing Stability:     Unable to Pay for Housing in the Last Year: Not on file    Number of [place] in the Last Year: Not on file    Unstable Housing in the Last Year: Not on file       Family History   Problem Relation Age of Onset    Diabetes Father     Diabetes Brother     Stroke Brother        Blood pressure (!) 157/80, pulse 80, height 6' (1.829 m), weight 177 lb (80.3 kg).     General:   Well developed, well nourished  Lungs:   Clear to auscultation, no rales  Heart:    RRR, Normal S1 S2, No murmur, rubs, or gallops  Abdomen:   Soft, non tender, no organomegalies, positive bowel sounds  Extremities:   No edema, no cyanosis, good peripheral pulses  Neurological:   Awake, alert, oriented. No obvious focal deficits  Musculoskeletal:  No obvious deformities    EKG:          Diagnosis Orders   1. New onset atrial fibrillation ([address]     2. Primary hypertension         No orders of the defined types were placed in this encounter. Assessment/Plan:   New onset PAfib- no further recurrences, no new or ongoing symtoms. Doing well now on eliquis. No bleeding issues. HTN-slightly elevated, usually runs okay. States it is improved since resuming his regular dose of lotensin 40, metoprolol 75 BID. Continue current treatment. Disposition:   F/u with Dr Neo Kenny as scheduled.    Continue Dr Beata Burroughs current treatment plan  Follow up with Dr Neo Kenny as scheduled or sooner if needed

## 2022-06-20 NOTE — PROCEDURES
[address] [place] [address]                                 EVENT MONITOR    PATIENT NAME: Tadeo PUTNAM                    :        1955  MED REC NO:   [id number]                           ROOM:       0016  ACCOUNT NO:   [de-identified]                           ADMIT DATE: 2022  PROVIDER:     Comfort Zacarias M.D.    TEST TYPE:  Event monitor. CLINICAL HISTORY AND INDICATION:  This is a patient with palpitation,  possible atrial fibrillation. EVENT MONITOR DESCRIPTION:  Event monitor was attached to the patient  between 2022 and 2022. EVENT MONITOR FINDINGS:  Baseline rhythm showed sinus rhythm with rare  PACs, otherwise no obvious arrhythmias noted. No atrial fibrillation. CONCLUSION:  Unremarkable event monitor with no significant arrhythmias.         Colt Martinez M.D.    D: 2022 7:30:45       T: 2022 9:07:44     [email or web address]  Job#: [id number]     Doc#: [id number]    CC:

## 2022-06-22 ENCOUNTER — OFFICE VISIT (OUTPATIENT)
Dept: CARDIOLOGY CLINIC | Age: 67
End: 2022-06-22
Payer: COMMERCIAL

## 2022-06-22 VITALS
BODY MASS INDEX: 23.98 KG/M2 | DIASTOLIC BLOOD PRESSURE: 80 MMHG | HEART RATE: 80 BPM | SYSTOLIC BLOOD PRESSURE: 157 MMHG | HEIGHT: 72 IN | WEIGHT: 177 LBS

## 2022-06-22 DIAGNOSIS — I48.91 NEW ONSET ATRIAL FIBRILLATION (HCC): Primary | ICD-10-CM

## 2022-06-22 DIAGNOSIS — I10 PRIMARY HYPERTENSION: Chronic | ICD-10-CM

## 2022-06-22 PROCEDURE — 1124F ACP DISCUSS-NO DSCNMKR DOCD: CPT | Performed by: STUDENT IN AN ORGANIZED HEALTH CARE EDUCATION/TRAINING PROGRAM

## 2022-06-22 PROCEDURE — 99213 OFFICE O/P EST LOW 20 MIN: CPT | Performed by: STUDENT IN AN ORGANIZED HEALTH CARE EDUCATION/TRAINING PROGRAM

## 2022-06-30 ENCOUNTER — TELEPHONE (OUTPATIENT)
Dept: CARDIOLOGY CLINIC | Age: 67
End: 2022-06-30

## 2022-06-30 NOTE — TELEPHONE ENCOUNTER
Patient is having right shoulder 7/13/22 with Dr Belkis Aguayo. Hebert Rodriguez saw patient 6/22/22. Dr Lalo Hansen saw 12/16/21    Can pt be cleared for surgery? He is on ASA. Hold x 5 days?

## 2022-06-30 NOTE — TELEPHONE ENCOUNTER
You need to get at least a verbal okay from abner  He saw lately I havent since last year   I cleared him last year but dont know if he was okay or not when seen De Mcardle

## 2022-07-01 NOTE — TELEPHONE ENCOUNTER
He was having no new symptoms since afib was converted. No further afib on monitor. Okay from my standpoint.

## 2022-07-18 RX ORDER — SEMAGLUTIDE 1.34 MG/ML
INJECTION, SOLUTION SUBCUTANEOUS
Qty: 3 ML | Refills: 5 | Status: SHIPPED | OUTPATIENT
Start: 2022-07-18

## 2022-07-27 RX ORDER — APIXABAN 5 MG/1
TABLET, FILM COATED ORAL
Qty: 180 TABLET | Refills: 2 | Status: SHIPPED | OUTPATIENT
Start: 2022-07-27 | End: 2022-09-21 | Stop reason: SDUPTHER

## 2022-08-09 ENCOUNTER — OFFICE VISIT (OUTPATIENT)
Dept: FAMILY MEDICINE CLINIC | Age: 67
End: 2022-08-09
Payer: COMMERCIAL

## 2022-08-09 VITALS
BODY MASS INDEX: 23.6 KG/M2 | RESPIRATION RATE: 16 BRPM | HEART RATE: 77 BPM | SYSTOLIC BLOOD PRESSURE: 136 MMHG | OXYGEN SATURATION: 97 % | TEMPERATURE: 97.8 F | DIASTOLIC BLOOD PRESSURE: 68 MMHG | WEIGHT: 174 LBS

## 2022-08-09 DIAGNOSIS — Z12.5 SCREENING FOR PROSTATE CANCER: ICD-10-CM

## 2022-08-09 DIAGNOSIS — Z13.220 SCREENING CHOLESTEROL LEVEL: ICD-10-CM

## 2022-08-09 DIAGNOSIS — I10 ESSENTIAL HYPERTENSION: ICD-10-CM

## 2022-08-09 DIAGNOSIS — G47.00 INSOMNIA, UNSPECIFIED TYPE: Primary | ICD-10-CM

## 2022-08-09 DIAGNOSIS — I48.91 ATRIAL FIBRILLATION WITH RVR (HCC): ICD-10-CM

## 2022-08-09 DIAGNOSIS — F32.A DEPRESSION, UNSPECIFIED DEPRESSION TYPE: ICD-10-CM

## 2022-08-09 DIAGNOSIS — G25.81 RLS (RESTLESS LEGS SYNDROME): ICD-10-CM

## 2022-08-09 DIAGNOSIS — E11.9 DM TYPE 2, GOAL HBA1C < 7% (HCC): ICD-10-CM

## 2022-08-09 PROCEDURE — 99214 OFFICE O/P EST MOD 30 MIN: CPT | Performed by: NURSE PRACTITIONER

## 2022-08-09 PROCEDURE — 1124F ACP DISCUSS-NO DSCNMKR DOCD: CPT | Performed by: NURSE PRACTITIONER

## 2022-08-09 RX ORDER — PRAMIPEXOLE DIHYDROCHLORIDE 0.12 MG/1
0.12 TABLET ORAL NIGHTLY
Qty: 30 TABLET | Refills: 3 | Status: SHIPPED | OUTPATIENT
Start: 2022-08-09

## 2022-08-09 RX ORDER — OXYCODONE HYDROCHLORIDE AND ACETAMINOPHEN 5; 325 MG/1; MG/1
TABLET ORAL
COMMUNITY
Start: 2022-07-27

## 2022-08-09 RX ORDER — TRAZODONE HYDROCHLORIDE 50 MG/1
50 TABLET ORAL NIGHTLY
Qty: 30 TABLET | Refills: 1 | Status: SHIPPED | OUTPATIENT
Start: 2022-08-09 | End: 2022-09-29

## 2022-08-14 LAB
ABSOLUTE BASO #: 0.1 K/UL (ref 0–0.2)
ABSOLUTE EOS #: 0.34 K/UL (ref 0–0.5)
ABSOLUTE LYMPH #: 2.62 K/UL (ref 1–4)
ABSOLUTE MONO #: 0.78 K/UL (ref 0.2–1)
ABSOLUTE NEUT #: 2.09 K/UL (ref 1.5–7.5)
ALBUMIN SERPL-MCNC: 5 G/DL (ref 3.5–5.2)
ALK PHOSPHATASE: 107 U/L (ref 40–123)
ALT SERPL-CCNC: 18 U/L (ref 5–50)
ANION GAP SERPL CALCULATED.3IONS-SCNC: 12 MEQ/L (ref 7–16)
AST SERPL-CCNC: 16 U/L (ref 9–50)
BASOPHILS RELATIVE PERCENT: 1.7 %
BILIRUB SERPL-MCNC: 0.6 MG/DL
BUN BLDV-MCNC: 12 MG/DL (ref 8–23)
CALCIUM SERPL-MCNC: 9.9 MG/DL (ref 8.5–10.5)
CHLORIDE BLD-SCNC: 102 MEQ/L (ref 95–107)
CHOLESTEROL/HDL RATIO: 3.9 RATIO
CHOLESTEROL: 186 MG/DL
CO2: 28 MEQ/L (ref 19–31)
CREAT SERPL-MCNC: 0.75 MG/DL (ref 0.8–1.4)
EGFR IF NONAFRICAN AMERICAN: 100 ML/MIN/1.73
EOSINOPHILS RELATIVE PERCENT: 5.7 %
GLUCOSE: 178 MG/DL (ref 70–99)
HCT VFR BLD CALC: 50.9 % (ref 40–51)
HDLC SERPL-MCNC: 48 MG/DL
HEMOGLOBIN: 17.3 G/DL (ref 13.5–17)
LDL CHOLESTEROL CALCULATED: 118 MG/DL
LDL/HDL RATIO: 2.5 RATIO
LYMPHOCYTE %: 44.1 %
MCH RBC QN AUTO: 31.7 PG (ref 25–33)
MCHC RBC AUTO-ENTMCNC: 34 G/DL (ref 31–36)
MCV RBC AUTO: 93.2 FL (ref 80–99)
MONOCYTES # BLD: 13.1 %
NEUTROPHILS RELATIVE PERCENT: 35.2 %
PDW BLD-RTO: 13.1 % (ref 11.5–15)
PLATELETS: 242 K/UL (ref 130–400)
PMV BLD AUTO: 10.2 FL (ref 9.3–13)
POTASSIUM SERPL-SCNC: 4.2 MEQ/L (ref 3.5–5.4)
PSA, ULTRASENSITIVE: 1.33 NG/ML
RBC: 5.46 M/UL (ref 4.5–6.1)
SODIUM BLD-SCNC: 142 MEQ/L (ref 133–146)
T4 FREE: 1.35 NG/DL (ref 0.8–1.9)
TOTAL PROTEIN: 7 G/DL (ref 6.1–8.3)
TRIGL SERPL-MCNC: 102 MG/DL
TSH SERPL DL<=0.05 MIU/L-ACNC: 1.77 UIU/ML (ref 0.4–4.1)
VLDLC SERPL CALC-MCNC: 20 MG/DL
WBC: 5.9 K/UL (ref 3.5–11)

## 2022-08-14 ASSESSMENT — ENCOUNTER SYMPTOMS
SHORTNESS OF BREATH: 0
ABDOMINAL PAIN: 0
ABDOMINAL DISTENTION: 0
BACK PAIN: 0
CHEST TIGHTNESS: 0
WHEEZING: 0
COUGH: 0

## 2022-08-14 NOTE — PROGRESS NOTES
[address] [place] [address]  Dept: [phone]  Dept Fax: [phone]  Loc: [phone]  PROGRESS NOTE      VisitDate: 8/9/2022    Refugio Schneider is a 74 y.o. male who presents today for:     Chief Complaint   Patient presents with    Leg Problem     RLS, each night, worsening since surgery         Subjective:  C/o worsening rls symptoms. Denies pain or numbness. c/o difficulty remaining asleep. Sleeping 3-4 hours nightly. mood stable. Taking meds as prescribed. Hist reviewed        Review of Systems   Constitutional:  Negative for activity change, appetite change, chills, fatigue and fever. Eyes:  Negative for visual disturbance. Respiratory:  Negative for cough, chest tightness, shortness of breath and wheezing. Cardiovascular:  Negative for chest pain, palpitations and leg swelling. Gastrointestinal:  Negative for abdominal distention and abdominal pain. Genitourinary:  Negative for dysuria. Musculoskeletal:  Negative for arthralgias, back pain and neck pain. Skin: Negative. Negative for rash. Neurological:  Negative for dizziness, light-headedness and headaches. Hematological:  Negative for adenopathy. Psychiatric/Behavioral:  Positive for decreased concentration, dysphoric mood and sleep disturbance. All other systems reviewed and are negative.   Past Medical History:   Diagnosis Date    BPH (benign prostatic hyperplasia)     Depression     Diabetes mellitus ([facility]     Factor 5 Leiden mutation, heterozygous ([facility]     Heterozygous MTHFR mutation [id number]     Hyperglycemia     Hypertension     Hypertension     Osteoarthritis     Sleep apnea     Stroke ([facility] 8/8/2013    Type II or unspecified type diabetes mellitus without mention of complication, not stated as uncontrolled 8/2012    Visual field cut 8/8/2013    Right visual field cut      Past Surgical History:   Procedure Laterality Date    ABDOMEN SURGERY APPENDECTOMY      CERVICAL FUSION  08    COLONOSCOPY  12    [facility]    TONSILLECTOMY      UMBILICAL HERNIA REPAIR  2012  Dr Rochelle Cornejo     Family History   Problem Relation Age of Onset    Diabetes Father     Diabetes Brother     Stroke Brother      Social History     Tobacco Use    Smoking status: Former     Packs/day: 0.50     Years: 40.00     Pack years: 20.00     Types: E-Cigarettes, Cigarettes     Quit date: 2013     Years since quittin.0    Smokeless tobacco: Never    Tobacco comments:     States he uses Vap   Substance Use Topics    Alcohol use: Yes     Alcohol/week: 0.0 standard drinks     Comment: social      Current Outpatient Medications   Medication Sig Dispense Refill    COLLAGEN PO Take by mouth      pramipexole (MIRAPEX) 0.125 MG tablet Take 1 tablet by mouth nightly 30 tablet 3    traZODone (DESYREL) 50 MG tablet Take 1 tablet by mouth nightly 30 tablet 1    empagliflozin (JARDIANCE) 25 MG tablet Take 1 tablet by mouth in the morning.  90 tablet 2    ELIQUIS 5 MG TABS tablet take 1 tablet by mouth twice a day 180 tablet 2    OZEMPIC, 1 MG/DOSE, 4 MG/3ML SOPN INJECT 1 MG INTO THE SKIN ONCE A WEEK 3 mL 5    benazepril (LOTENSIN) 40 MG tablet Take 1 tablet by mouth daily 90 tablet 0    metoprolol tartrate 75 MG TABS Take 75 mg by mouth 2 times daily 60 tablet 3    VORTIoxetine (TRINTELLIX) 10 MG TABS tablet take 1 tablet by mouth once daily 90 tablet 2    Blood Glucose Monitoring Suppl (ACCU-CHEK GUIDE) w/Device KIT CHECK BLOOD SUGAR DAILY      TRUEplus Lancets 33G MISC USE TWICE A  each 3    blood glucose test strips (ACCU-CHEK SMARTVIEW) strip CHECK BLOOD SUGAR TWICE A DAY AS NEEDED FOR SYMPTOMS OF IRREGULAR BLOOD GLUCOSE 100 strip 3    Apple Sudhir Vn-Grn Tea-Bit Or-Cr (APPLE CIDER VINEGAR PLUS) TABS Take by mouth      ACCU-CHEK SMARTVIEW strip TEST BLOOD SUGAR ONCE A DAY 50 strip 11    Cholecalciferol (VITAMIN D3) 2000 units CAPS Take by mouth daily       latanoprost (XALATAN) 0.005 % ophthalmic solution Place 1 drop into both eyes daily       Esomeprazole Magnesium (NEXIUM 24HR PO) Take by mouth daily       OLIVE LEAF PO Take by mouth       NONFORMULARY daily PROSTATE SUPPORT      multivitamin (THERAGRAN) per tablet Take 1 tablet by mouth daily. Lancets MISC Use qd  Dx:  250.02 50 each 11    cetirizine (ZYRTEC) 10 MG tablet Take 10 mg by mouth daily. oxyCODONE-acetaminophen (PERCOCET) 5-325 MG per tablet take 1 tablet by mouth every 6 hours if needed for pain for 7 days       No current facility-administered medications for this visit. No Known Allergies  Health Maintenance   Topic Date Due    HIV screen  Never done    DTaP/Tdap/Td vaccine (1 - Tdap) Never done    Shingles vaccine (1 of 2) Never done    Low dose CT lung screening  Never done    Pneumococcal 65+ years Vaccine (2 - PPSV23 or PCV20) 08/16/2020    Lipids  09/18/2021    COVID-19 Vaccine (4 - Booster for Moderna series) 02/24/2022    Diabetic microalbuminuria test  05/21/2022    AAA screen  Never done    Flu vaccine (1) 09/01/2022    Diabetic foot exam  12/01/2022    A1C test (Diabetic or Prediabetic)  12/01/2022    Diabetic retinal exam  04/29/2023    Depression Monitoring  06/09/2023    Colorectal Cancer Screen  09/08/2027    Hepatitis C screen  Completed    Hepatitis A vaccine  Aged Out    Hib vaccine  Aged Out    Meningococcal (ACWY) vaccine  Aged Out         Objective:     Physical Exam  Vitals and nursing note reviewed. Constitutional:       Appearance: Normal appearance. He is well-developed. He is not diaphoretic. HENT:      Head: Normocephalic and atraumatic. Not macrocephalic and not microcephalic. Right Ear: Hearing, tympanic membrane, ear canal and external ear normal. No drainage or tenderness. No middle ear effusion. No hemotympanum. Tympanic membrane is not injected, scarred, perforated or bulging.       Left Ear: Hearing, tympanic membrane, ear canal and external ear normal. No drainage or tenderness. No middle ear effusion. No hemotympanum. Tympanic membrane is not injected, scarred, perforated or bulging. Nose: No nasal deformity, septal deviation, mucosal edema or rhinorrhea. Right Sinus: No maxillary sinus tenderness or frontal sinus tenderness. Left Sinus: No maxillary sinus tenderness or frontal sinus tenderness. Mouth/Throat:      Mouth: No oral lesions. Dentition: Normal dentition. Does not have dentures. No dental caries or dental abscesses. Pharynx: No oropharyngeal exudate or posterior oropharyngeal erythema. Tonsils: No tonsillar abscesses. Eyes:      General: Lids are normal. No scleral icterus. Extraocular Movements:      Right eye: Normal extraocular motion. Left eye: Normal extraocular motion. Conjunctiva/sclera: Conjunctivae normal.      Right eye: Right conjunctiva is not injected. Left eye: Left conjunctiva is not injected. Neck:      Thyroid: No thyroid mass or thyromegaly. Vascular: No carotid bruit or JVD. Trachea: Trachea normal.   Cardiovascular:      Rate and Rhythm: Normal rate and regular rhythm. Heart sounds: Normal heart sounds, S1 normal and S2 normal. No murmur heard. No friction rub. No gallop. Pulmonary:      Effort: Pulmonary effort is normal. No respiratory distress. Breath sounds: Normal breath sounds. No wheezing, rhonchi or rales. Chest:      Chest wall: No tenderness. Breasts:     Right: No supraclavicular adenopathy. Left: No supraclavicular adenopathy. Abdominal:      General: Bowel sounds are normal.      Palpations: Abdomen is soft. There is no hepatomegaly, splenomegaly or mass. Tenderness: There is no guarding or rebound. Hernia: No hernia is present. There is no hernia in the ventral area or left inguinal area. Musculoskeletal:         General: No tenderness. Normal range of motion.       Cervical back: Normal range of motion and neck supple. No edema or erythema. Normal range of motion. Lymphadenopathy:      Head:      Right side of head: No submental, submandibular, tonsillar, preauricular, posterior auricular or occipital adenopathy. Left side of head: No submental, submandibular, tonsillar, preauricular, posterior auricular or occipital adenopathy. Cervical: No cervical adenopathy. Right cervical: No superficial, deep or posterior cervical adenopathy. Left cervical: No superficial, deep or posterior cervical adenopathy. Upper Body:      Right upper body: No supraclavicular or pectoral adenopathy. Left upper body: No supraclavicular or pectoral adenopathy. Skin:     General: Skin is warm and dry. Coloration: Skin is not pale. Findings: No bruising, ecchymosis, laceration, lesion or rash. Nails: There is no clubbing. Neurological:      Mental Status: He is alert and oriented to person, place, and time. Cranial Nerves: No cranial nerve deficit. Motor: No abnormal muscle tone. Coordination: Coordination normal.      Deep Tendon Reflexes: Reflexes normal.   Psychiatric:         Speech: Speech normal.         Behavior: Behavior normal.         Thought Content: Thought content normal.         Judgment: Judgment normal.     /68 (Site: Left Upper Arm)   Pulse 77   Temp 97.8 °F (36.6 °C) (Oral)   Resp 16   Wt 174 lb (78.9 kg)   SpO2 97%   BMI 23.60 kg/m²       Impression/Plan:  1. Insomnia, unspecified type    2. RLS (restless legs syndrome)    3. Atrial fibrillation with RVR ([facility]    4. DM type 2, goal HbA1c < 7% ([facility])    5. Essential hypertension    6. Depression, unspecified depression type    7. Screening cholesterol level    8.  Screening for prostate cancer      Requested Prescriptions     Signed Prescriptions Disp Refills    pramipexole (MIRAPEX) 0.125 MG tablet 30 tablet 3     Sig: Take 1 tablet by mouth nightly    traZODone (DESYREL) 50 MG tablet 30 tablet 1     Sig:

## 2022-08-16 LAB
AVERAGE GLUCOSE: 146 MG/DL
HBA1C MFR BLD: 6.7 % (ref 4.2–5.6)

## 2022-09-21 RX ORDER — METOPROLOL TARTRATE 75 MG/1
75 TABLET, FILM COATED ORAL 2 TIMES DAILY
Qty: 180 TABLET | Refills: 1 | Status: SHIPPED | OUTPATIENT
Start: 2022-09-21 | End: 2022-09-21 | Stop reason: SDUPTHER

## 2022-09-21 RX ORDER — METOPROLOL TARTRATE 75 MG/1
75 TABLET, FILM COATED ORAL 2 TIMES DAILY
Qty: 180 TABLET | Refills: 1 | Status: CANCELLED | OUTPATIENT
Start: 2022-09-21

## 2022-09-21 RX ORDER — METOPROLOL TARTRATE 75 MG/1
75 TABLET, FILM COATED ORAL 2 TIMES DAILY
Qty: 60 TABLET | Refills: 0 | Status: SHIPPED | OUTPATIENT
Start: 2022-09-21 | End: 2022-10-17 | Stop reason: SDUPTHER

## 2022-09-21 NOTE — TELEPHONE ENCOUNTER
Patient is wanting to know if he can get a temporary or short term script sent to the local pharmacy for this medication. [facility] said [facility] script but he doesn't think it will get to him before he runs out of what he has now. He said he only has about 4 days left.        Bruce D Pitson called requesting a refill on the following medications:  Requested Prescriptions     Pending Prescriptions Disp Refills    Metoprolol Tartrate 75 MG TABS 180 tablet 1     Sig: Take 75 mg by mouth 2 times daily     Pharmacy verified:  .[id number] [facility]    Date of last visit: 06/22/2022 (justino)  Date of next visit (if applicable): 2/18/3334 (raul)

## 2022-09-29 RX ORDER — TRAZODONE HYDROCHLORIDE 50 MG/1
50 TABLET ORAL NIGHTLY
Qty: 30 TABLET | Refills: 1 | Status: SHIPPED | OUTPATIENT
Start: 2022-09-29

## 2022-10-12 ENCOUNTER — OFFICE VISIT (OUTPATIENT)
Dept: PULMONOLOGY | Age: 67
End: 2022-10-12
Payer: COMMERCIAL

## 2022-10-12 VITALS
DIASTOLIC BLOOD PRESSURE: 80 MMHG | HEART RATE: 87 BPM | WEIGHT: 177.8 LBS | OXYGEN SATURATION: 97 % | TEMPERATURE: 98.1 F | HEIGHT: 72 IN | SYSTOLIC BLOOD PRESSURE: 140 MMHG | BODY MASS INDEX: 24.08 KG/M2

## 2022-10-12 DIAGNOSIS — R06.3 CENTRAL SLEEP APNEA DUE TO CHEYNE-STOKES RESPIRATION: Primary | ICD-10-CM

## 2022-10-12 DIAGNOSIS — Z91.09 ENVIRONMENTAL ALLERGIES: ICD-10-CM

## 2022-10-12 PROCEDURE — 99213 OFFICE O/P EST LOW 20 MIN: CPT

## 2022-10-12 PROCEDURE — 1124F ACP DISCUSS-NO DSCNMKR DOCD: CPT

## 2022-10-12 ASSESSMENT — ENCOUNTER SYMPTOMS
SINUS PRESSURE: 0
WHEEZING: 0
COUGH: 1
SINUS PAIN: 0
RHINORRHEA: 1
SHORTNESS OF BREATH: 0

## 2022-10-12 NOTE — PROGRESS NOTES
[facility]      Natalie Dsouza         [id number]  10/12/2022   Chief Complaint   Patient presents with    Follow-up     CSA 1 year f/u with DL 9/12/22-10/11/22        Pt of Dr. Waylon Reddy    PAP Download:   Kesha Muse or initial AHI: 35.4     Date of initial study: 11/14/2010      Compliant  87%     Noncompliant 13 %     PAP Type ASVAuto Level  6-15 cmH2O   Avg Hrs/Day 6hrs 49mins  AHI: 1.1   Recorded compliance dates ,  9/12/22 - 10/11/22  Machine/Mfg:   [x] ResMed    [] Respironics/Dreamstation   Interface:   [x] Nasal    [] Nasal pillows   [] FFM      Provider:      [] [facility]     [x][facility]     [] [employer]    [] [address]    [] [place]               [] [facility]      [] [employer]    [] [address]      [] Other    Neck Size: 17  Mallampati 4  ESS:  3  SAQLI: 96    Here is a scan of the most recent download:                Presentation:   Estuardo Painter presents for sleep medicine follow up for central sleep apnea  Since the last visit, Estuardo Painter has been doing well on PAP therapy. He reports that he had shoulder surgery in July and had trouble sleeping due to shoulder pain. He reports this is more comfortable now and he is sleeping in his bed now. He was previously sleeping in a chair. Reports that he retired 6 months ago and his sleep schedule has changed. He now stays up until about 1 AM. He admits to a small leak when laying on his side. He has weight restrictions with the shoulder surgery and is currently in physical therapy. He reports after being start on Ozempic, he lost 20 lbs and has maintained this. He is watching what he is eating and not snacking. Equipment issues: The pressure is  acceptable, the mask is acceptable     Sleep issues:  Do you feel better? Yes  More rested? Yes   Better concentration? yes    Progress History:   Since last visit any new medical issues? Yes back and shoulder surgery  New ER or hospital visits?  Yes reports overnight stay after his back surgery and May 2022 had Atrial Fibrillation - started on metoprolol which controlled his heart rate and rhythm   Any new or changes in medicines? Yes see below sleep medications  Any new sleep medicines? Yes he is taking trazodone now and mirapex now by his primary care provider     Review of Systems -   Review of Systems   Constitutional:  Negative for appetite change and fever. HENT:  Positive for congestion, postnasal drip and rhinorrhea. Negative for sinus pressure, sinus pain and sneezing. Respiratory:  Positive for cough (dry). Negative for shortness of breath and wheezing. Cardiovascular:  Negative for chest pain, palpitations and leg swelling. A. Fib - on metoprolol and eliquis   Allergic/Immunologic: Positive for environmental allergies. Generic Zyrtec      Physical Exam:    BMI:  Body mass index is 24.11 kg/m². Wt Readings from Last 3 Encounters:   10/12/22 177 lb 12.8 oz (80.6 kg)   08/09/22 174 lb (78.9 kg)   06/22/22 177 lb (80.3 kg)     Weight gained 2 lbs over 1 year  Vitals: BP (!) 140/80   Pulse 87   Temp 98.1 °F (36.7 °C)   Ht 6' (1.829 m)   Wt 177 lb 12.8 oz (80.6 kg)   SpO2 97%   BMI 24.11 kg/m²       Physical Exam  Constitutional:       General: He is not in acute distress. Appearance: He is normal weight. Comments: BMI 24   HENT:      Head: Normocephalic and atraumatic. Right Ear: External ear normal.      Left Ear: External ear normal.      Mouth/Throat:      Mouth: Mucous membranes are moist.      Pharynx: No oropharyngeal exudate or posterior oropharyngeal erythema. Eyes:      General:         Right eye: No discharge. Left eye: No discharge. Cardiovascular:      Rate and Rhythm: Normal rate. Pulmonary:      Effort: Pulmonary effort is normal. No respiratory distress. Breath sounds: No wheezing, rhonchi or rales. Musculoskeletal:      Cervical back: Neck supple. Right lower leg: No edema. Left lower leg: No edema.    Skin: General: Skin is warm and dry. Neurological:      General: No focal deficit present. Mental Status: He is alert. Psychiatric:         Mood and Affect: Mood normal.         Behavior: Behavior normal.         Thought Content: Thought content normal.         ASSESSMENT/DIAGNOSIS     Diagnosis Orders   1. Central sleep apnea due to Cheyne-Spence respiration        2. Environmental allergies                 Plan   Do you need any equipment today? Yes - yearly supply order placed  -Patient's symptoms and AHI are currently well controlled on current ASV settings. Continue current ASV settings  -Advised to continue current positive airway pressure therapy with above described pressure. -Advised to keep good compliance with current recommended pressure to get optimal results and clinical improvement  -Recommend 7-9 hours of sleep with PAP  -Instructed to call [employer] company regarding supplies if needed.   -Patient to call office for earlier appointment if needed for worsening of sleep symptoms. -Advised healthy lifestyle and weight monitoring  -Patient's allergy symptoms are fairly well controlled with cetirizine, continue over-the-counter cetirizine  -Educated about my impression and plan. Patient verbalizes understanding. Will see patient back in: 1 year with download     Information added by my medical assistant/LPN was reviewed today.     Electronically signed by ARTURO Welsh CNP on 10/12/2022 at 1:30 PM

## 2022-10-17 RX ORDER — BENAZEPRIL HYDROCHLORIDE 40 MG/1
40 TABLET, FILM COATED ORAL DAILY
Qty: 90 TABLET | Refills: 2 | Status: SHIPPED | OUTPATIENT
Start: 2022-10-17

## 2022-10-17 RX ORDER — METOPROLOL TARTRATE 75 MG/1
75 TABLET, FILM COATED ORAL 2 TIMES DAILY
Qty: 60 TABLET | Refills: 4 | Status: SHIPPED | OUTPATIENT
Start: 2022-10-17

## 2022-11-23 RX ORDER — CEFUROXIME AXETIL 250 MG/1
250 TABLET ORAL 2 TIMES DAILY
Qty: 20 TABLET | Refills: 0 | Status: SHIPPED | OUTPATIENT
Start: 2022-11-23 | End: 2022-12-03

## 2022-11-23 RX ORDER — BENZONATATE 200 MG/1
200 CAPSULE ORAL 3 TIMES DAILY PRN
Qty: 30 CAPSULE | Refills: 0 | Status: SHIPPED | OUTPATIENT
Start: 2022-11-23 | End: 2022-11-30

## 2022-11-29 RX ORDER — PRAMIPEXOLE DIHYDROCHLORIDE 0.12 MG/1
0.12 TABLET ORAL NIGHTLY
Qty: 30 TABLET | Refills: 3 | Status: SHIPPED | OUTPATIENT
Start: 2022-11-29

## 2022-11-29 RX ORDER — TRAZODONE HYDROCHLORIDE 50 MG/1
50 TABLET ORAL NIGHTLY
Qty: 30 TABLET | Refills: 1 | Status: SHIPPED | OUTPATIENT
Start: 2022-11-29

## 2023-01-30 RX ORDER — TRAZODONE HYDROCHLORIDE 50 MG/1
50 TABLET ORAL NIGHTLY
Qty: 30 TABLET | Refills: 1 | Status: SHIPPED | OUTPATIENT
Start: 2023-01-30

## 2023-02-14 RX ORDER — SEMAGLUTIDE 1.34 MG/ML
INJECTION, SOLUTION SUBCUTANEOUS
Qty: 3 ML | Refills: 5 | OUTPATIENT
Start: 2023-02-14

## 2023-02-16 ENCOUNTER — PATIENT MESSAGE (OUTPATIENT)
Dept: FAMILY MEDICINE CLINIC | Age: 68
End: 2023-02-16

## 2023-02-16 DIAGNOSIS — I48.91 ATRIAL FIBRILLATION WITH RVR (HCC): Primary | ICD-10-CM

## 2023-02-16 DIAGNOSIS — I10 ESSENTIAL HYPERTENSION: ICD-10-CM

## 2023-02-16 DIAGNOSIS — E11.9 DM TYPE 2, GOAL HBA1C < 7% (HCC): ICD-10-CM

## 2023-02-16 NOTE — TELEPHONE ENCOUNTER
From: Zoe Cooper  To: Judith Rios  Sent: 2/16/2023 1:49 PM EST  Subject: Blood draw request     I have an appointment scheduled for Tuesday next weekCan you request blood tests from [address] [place] [address] Pathology to be done in advance so the results can be discussed then?  Thanks

## 2023-02-17 LAB
AVERAGE GLUCOSE: 157 MG/DL
HBA1C MFR BLD: 7.1 % (ref 4.2–5.6)

## 2023-02-18 LAB
ALBUMIN SERPL-MCNC: 4.8 G/DL (ref 3.5–5.2)
ALK PHOSPHATASE: 100 U/L (ref 40–123)
ALT SERPL-CCNC: 24 U/L (ref 5–50)
ANION GAP SERPL CALCULATED.3IONS-SCNC: 10 MEQ/L (ref 7–16)
AST SERPL-CCNC: 20 U/L (ref 9–50)
BILIRUB SERPL-MCNC: 0.7 MG/DL
BUN BLDV-MCNC: 9 MG/DL (ref 8–23)
CALCIUM SERPL-MCNC: 9.6 MG/DL (ref 8.5–10.5)
CHLORIDE BLD-SCNC: 104 MEQ/L (ref 95–107)
CHOLESTEROL/HDL RATIO: 3.4 RATIO
CHOLESTEROL: 169 MG/DL
CO2: 28 MEQ/L (ref 19–31)
CREAT SERPL-MCNC: 0.84 MG/DL (ref 0.8–1.4)
EGFR IF NONAFRICAN AMERICAN: 97 ML/MIN/1.73
GLUCOSE: 167 MG/DL (ref 70–99)
HDLC SERPL-MCNC: 50 MG/DL
LDL CHOLESTEROL CALCULATED: 104 MG/DL
LDL/HDL RATIO: 2.1 RATIO
POTASSIUM SERPL-SCNC: 4.5 MEQ/L (ref 3.5–5.4)
SODIUM BLD-SCNC: 142 MEQ/L (ref 133–146)
TOTAL PROTEIN: 6.6 G/DL (ref 6.1–8.3)
TRIGL SERPL-MCNC: 77 MG/DL
VLDLC SERPL CALC-MCNC: 15 MG/DL

## 2023-02-21 ENCOUNTER — OFFICE VISIT (OUTPATIENT)
Dept: FAMILY MEDICINE CLINIC | Age: 68
End: 2023-02-21
Payer: COMMERCIAL

## 2023-02-21 VITALS
SYSTOLIC BLOOD PRESSURE: 128 MMHG | TEMPERATURE: 98.1 F | WEIGHT: 183 LBS | BODY MASS INDEX: 24.82 KG/M2 | HEART RATE: 67 BPM | DIASTOLIC BLOOD PRESSURE: 70 MMHG | OXYGEN SATURATION: 97 % | RESPIRATION RATE: 16 BRPM

## 2023-02-21 DIAGNOSIS — I48.91 ATRIAL FIBRILLATION WITH RVR (HCC): ICD-10-CM

## 2023-02-21 DIAGNOSIS — G47.00 INSOMNIA, UNSPECIFIED TYPE: ICD-10-CM

## 2023-02-21 DIAGNOSIS — F32.A DEPRESSION, UNSPECIFIED DEPRESSION TYPE: ICD-10-CM

## 2023-02-21 DIAGNOSIS — G25.81 RLS (RESTLESS LEGS SYNDROME): ICD-10-CM

## 2023-02-21 DIAGNOSIS — E11.65 TYPE 2 DIABETES MELLITUS WITH HYPERGLYCEMIA, WITHOUT LONG-TERM CURRENT USE OF INSULIN (HCC): Primary | ICD-10-CM

## 2023-02-21 DIAGNOSIS — E11.9 DM TYPE 2, GOAL HBA1C < 7% (HCC): ICD-10-CM

## 2023-02-21 DIAGNOSIS — I10 ESSENTIAL HYPERTENSION: ICD-10-CM

## 2023-02-21 PROCEDURE — 3051F HG A1C>EQUAL 7.0%<8.0%: CPT | Performed by: NURSE PRACTITIONER

## 2023-02-21 PROCEDURE — 3078F DIAST BP <80 MM HG: CPT | Performed by: NURSE PRACTITIONER

## 2023-02-21 PROCEDURE — 3074F SYST BP LT 130 MM HG: CPT | Performed by: NURSE PRACTITIONER

## 2023-02-21 PROCEDURE — 90471 IMMUNIZATION ADMIN: CPT | Performed by: NURSE PRACTITIONER

## 2023-02-21 PROCEDURE — 90677 PCV20 VACCINE IM: CPT | Performed by: NURSE PRACTITIONER

## 2023-02-21 PROCEDURE — 1124F ACP DISCUSS-NO DSCNMKR DOCD: CPT | Performed by: NURSE PRACTITIONER

## 2023-02-21 PROCEDURE — 99214 OFFICE O/P EST MOD 30 MIN: CPT | Performed by: NURSE PRACTITIONER

## 2023-02-21 RX ORDER — SEMAGLUTIDE 2.68 MG/ML
2 INJECTION, SOLUTION SUBCUTANEOUS WEEKLY
Qty: 9 ML | Refills: 2 | Status: SHIPPED | OUTPATIENT
Start: 2023-02-21

## 2023-02-21 RX ORDER — SEMAGLUTIDE 1.34 MG/ML
INJECTION, SOLUTION SUBCUTANEOUS
Qty: 3 ML | Refills: 5 | Status: CANCELLED | OUTPATIENT
Start: 2023-02-21

## 2023-02-21 SDOH — ECONOMIC STABILITY: FOOD INSECURITY: WITHIN THE PAST 12 MONTHS, THE FOOD YOU BOUGHT JUST DIDN'T LAST AND YOU DIDN'T HAVE MONEY TO GET MORE.: NEVER TRUE

## 2023-02-21 SDOH — ECONOMIC STABILITY: INCOME INSECURITY: HOW HARD IS IT FOR YOU TO PAY FOR THE VERY BASICS LIKE FOOD, HOUSING, MEDICAL CARE, AND HEATING?: NOT HARD AT ALL

## 2023-02-21 SDOH — ECONOMIC STABILITY: HOUSING INSECURITY
IN THE LAST 12 MONTHS, WAS THERE A TIME WHEN YOU DID NOT HAVE A STEADY PLACE TO SLEEP OR SLEPT IN A SHELTER (INCLUDING NOW)?: NO

## 2023-02-21 SDOH — ECONOMIC STABILITY: FOOD INSECURITY: WITHIN THE PAST 12 MONTHS, YOU WORRIED THAT YOUR FOOD WOULD RUN OUT BEFORE YOU GOT MONEY TO BUY MORE.: NEVER TRUE

## 2023-02-21 ASSESSMENT — PATIENT HEALTH QUESTIONNAIRE - PHQ9
SUM OF ALL RESPONSES TO PHQ QUESTIONS 1-9: 0
9. THOUGHTS THAT YOU WOULD BE BETTER OFF DEAD, OR OF HURTING YOURSELF: 0
2. FEELING DOWN, DEPRESSED OR HOPELESS: 0
10. IF YOU CHECKED OFF ANY PROBLEMS, HOW DIFFICULT HAVE THESE PROBLEMS MADE IT FOR YOU TO DO YOUR WORK, TAKE CARE OF THINGS AT HOME, OR GET ALONG WITH OTHER PEOPLE: 0
8. MOVING OR SPEAKING SO SLOWLY THAT OTHER PEOPLE COULD HAVE NOTICED. OR THE OPPOSITE, BEING SO FIGETY OR RESTLESS THAT YOU HAVE BEEN MOVING AROUND A LOT MORE THAN USUAL: 0
SUM OF ALL RESPONSES TO PHQ9 QUESTIONS 1 & 2: 0
6. FEELING BAD ABOUT YOURSELF - OR THAT YOU ARE A FAILURE OR HAVE LET YOURSELF OR YOUR FAMILY DOWN: 0
4. FEELING TIRED OR HAVING LITTLE ENERGY: 0
7. TROUBLE CONCENTRATING ON THINGS, SUCH AS READING THE NEWSPAPER OR WATCHING TELEVISION: 0
SUM OF ALL RESPONSES TO PHQ QUESTIONS 1-9: 0
5. POOR APPETITE OR OVEREATING: 0
3. TROUBLE FALLING OR STAYING ASLEEP: 0
1. LITTLE INTEREST OR PLEASURE IN DOING THINGS: 0

## 2023-02-21 NOTE — PROGRESS NOTES
Immunizations Administered       Name Date Dose Route    Pneumococcal conjugate PCV20, PF (Prevnar 20) 2/21/2023 0.5 mL Intramuscular    Site: Deltoid- Left    Lot: [id number]    NDC: 2255-4451-50

## 2023-02-22 ASSESSMENT — ENCOUNTER SYMPTOMS
ABDOMINAL DISTENTION: 0
CHEST TIGHTNESS: 0
BACK PAIN: 0
COUGH: 0
SHORTNESS OF BREATH: 0
WHEEZING: 0
ABDOMINAL PAIN: 0

## 2023-02-22 NOTE — PROGRESS NOTES
[address] [place] [address]  Dept: [phone]  Dept Fax: [phone]  Loc: [phone]  PROGRESS NOTE      VisitDate: 2/21/2023    Rita Cao is a 74 y.o. male who presents today for:     Chief Complaint   Patient presents with    Follow-up Chronic Condition     DM    Discuss Labs         Subjective:  Routine 3-month follow-up for discussion of labs. History of depression BPH type 2 diabetes factor V Leiden mutation hypertension osteoarthritis sleep apnea CVA. Patient reports feeling good. Denies any fever illness headaches dizziness syncope chest pain shortness of breath abdominal pain edema numbness rash. Review of Systems   Constitutional:  Negative for activity change, appetite change, chills, fatigue and fever. Eyes:  Negative for visual disturbance. Respiratory:  Negative for cough, chest tightness, shortness of breath and wheezing. Cardiovascular:  Negative for chest pain, palpitations and leg swelling. Gastrointestinal:  Negative for abdominal distention and abdominal pain. Genitourinary:  Negative for dysuria. Musculoskeletal:  Positive for arthralgias. Negative for back pain and neck pain. Skin: Negative. Negative for rash. Neurological:  Negative for dizziness, light-headedness and headaches. Hematological:  Negative for adenopathy. Psychiatric/Behavioral:  Positive for decreased concentration and dysphoric mood. All other systems reviewed and are negative.   Past Medical History:   Diagnosis Date    BPH (benign prostatic hyperplasia)     Depression     Diabetes mellitus ([facility]     Factor 5 Leiden mutation, heterozygous ([facility]     Heterozygous MTHFR mutation [id number]     Hyperglycemia     Hypertension     Hypertension     Osteoarthritis     Sleep apnea     [facility]    Type II or unspecified type diabetes mellitus without mention of complication, not stated as uncontrolled 2012    Visual field cut 2013    Right visual field cut      Past Surgical History:   Procedure Laterality Date    ABDOMEN SURGERY      APPENDECTOMY      CERVICAL FUSION  08    COLONOSCOPY  12    [address]    TONSILLECTOMY      UMBILICAL HERNIA REPAIR  2012  Dr Dilip Booth     Family History   Problem Relation Age of Onset    Diabetes Father     Diabetes Brother     Stroke Brother      Social History     Tobacco Use    Smoking status: Former     Packs/day: 0.50     Years: 40.00     Pack years: 20.00     Types: E-Cigarettes, Cigarettes     Quit date: 2013     Years since quittin.5    Smokeless tobacco: Never    Tobacco comments:     States he uses Vap   Substance Use Topics    Alcohol use: Yes     Alcohol/week: 0.0 standard drinks     Comment: social      Current Outpatient Medications   Medication Sig Dispense Refill    Semaglutide, 2 MG/DOSE, (OZEMPIC, 2 MG/DOSE,) 8 MG/3ML SOPN Inject 2 mg into the skin once a week 9 mL 2    traZODone (DESYREL) 50 MG tablet take 1 tablet by mouth nightly 30 tablet 1    pramipexole (MIRAPEX) 0.125 MG tablet take 1 tablet by mouth nightly 30 tablet 3    VORTIoxetine (TRINTELLIX) 10 MG TABS tablet take 1 tablet by mouth once daily 90 tablet 2    benazepril (LOTENSIN) 40 MG tablet TAKE 1 TABLET BY MOUTH DAILY 90 tablet 2    Metoprolol Tartrate 75 MG TABS Take 75 mg by mouth 2 times daily 60 tablet 4    apixaban (ELIQUIS) 5 MG TABS tablet take 1 tablet by mouth twice a day 180 tablet 1    COLLAGEN PO Take by mouth      empagliflozin (JARDIANCE) 25 MG tablet Take 1 tablet by mouth in the morning.  90 tablet 2    Blood Glucose Monitoring Suppl (ACCU-CHEK GUIDE) w/Device KIT CHECK BLOOD SUGAR DAILY      TRUEplus Lancets 33G MISC USE TWICE A  each 3    blood glucose test strips (ACCU-CHEK SMARTVIEW) strip CHECK BLOOD SUGAR TWICE A DAY AS NEEDED FOR SYMPTOMS OF IRREGULAR BLOOD GLUCOSE 100 strip 3    Apple Sudhir Vn-Grn Tea-Bit Or-Cr (APPLE CIDER VINEGAR PLUS) TABS Take by mouth      ACCU-CHEK SMARTVIEW strip TEST BLOOD SUGAR ONCE A DAY 50 strip 11    Cholecalciferol (VITAMIN D3) 2000 units CAPS Take by mouth daily       latanoprost (XALATAN) 0.005 % ophthalmic solution Place 1 drop into both eyes daily       Esomeprazole Magnesium (NEXIUM 24HR PO) Take by mouth daily       OLIVE LEAF PO Take by mouth       NONFORMULARY daily PROSTATE SUPPORT      multivitamin (THERAGRAN) per tablet Take 1 tablet by mouth daily. Lancets MISC Use qd  Dx:  250.02 50 each 11    cetirizine (ZYRTEC) 10 MG tablet Take 10 mg by mouth daily. No current facility-administered medications for this visit. No Known Allergies  Health Maintenance   Topic Date Due    HIV screen  Never done    Diabetic Alb to Cr ratio (uACR) test  Never done    DTaP/Tdap/Td vaccine (1 - Tdap) Never done    Low dose CT lung screening  Never done    AAA screen  Never done    Flu vaccine (1) 08/01/2022    Shingles vaccine (2 of 2) 12/02/2022    Diabetic retinal exam  04/29/2023    A1C test (Diabetic or Prediabetic)  02/17/2024    Lipids  02/17/2024    GFR test (Diabetes, CKD 3-4, OR last GFR 15-59)  02/17/2024    Diabetic foot exam  02/21/2024    Depression Monitoring  02/21/2024    Colorectal Cancer Screen  09/08/2027    Pneumococcal 65+ years Vaccine  Completed    COVID-19 Vaccine  Completed    Hepatitis C screen  Completed    Hepatitis A vaccine  Aged Out    Hib vaccine  Aged Out    Meningococcal (ACWY) vaccine  Aged Out         Objective:     Physical Exam  Vitals and nursing note reviewed. Constitutional:       Appearance: Normal appearance. He is well-developed. He is not diaphoretic. HENT:      Head: Normocephalic and atraumatic. Not macrocephalic and not microcephalic. Right Ear: Hearing, tympanic membrane, ear canal and external ear normal. No drainage or tenderness. No middle ear effusion. No hemotympanum. Tympanic membrane is not injected, scarred, perforated or bulging.       Left Ear: Hearing, tympanic membrane, ear canal and external ear normal. No drainage or tenderness. No middle ear effusion. No hemotympanum. Tympanic membrane is not injected, scarred, perforated or bulging. Nose: No nasal deformity, septal deviation, mucosal edema or rhinorrhea. Right Sinus: No maxillary sinus tenderness or frontal sinus tenderness. Left Sinus: No maxillary sinus tenderness or frontal sinus tenderness. Mouth/Throat:      Mouth: No oral lesions. Dentition: Normal dentition. Does not have dentures. No dental caries or dental abscesses. Pharynx: No oropharyngeal exudate or posterior oropharyngeal erythema. Tonsils: No tonsillar abscesses. Eyes:      General: Lids are normal. No scleral icterus. Extraocular Movements:      Right eye: Normal extraocular motion. Left eye: Normal extraocular motion. Conjunctiva/sclera: Conjunctivae normal.      Right eye: Right conjunctiva is not injected. Left eye: Left conjunctiva is not injected. Neck:      Thyroid: No thyroid mass or thyromegaly. Vascular: No carotid bruit or JVD. Trachea: Trachea normal.   Cardiovascular:      Rate and Rhythm: Normal rate and regular rhythm. Heart sounds: Normal heart sounds, S1 normal and S2 normal. No murmur heard. No friction rub. No gallop. Pulmonary:      Effort: Pulmonary effort is normal. No respiratory distress. Breath sounds: Normal breath sounds. No wheezing, rhonchi or rales. Chest:      Chest wall: No tenderness. Abdominal:      General: Bowel sounds are normal.      Palpations: Abdomen is soft. There is no hepatomegaly, splenomegaly or mass. Tenderness: There is no guarding or rebound. Hernia: No hernia is present. There is no hernia in the ventral area or left inguinal area. Musculoskeletal:         General: No tenderness. Normal range of motion. Cervical back: Normal range of motion and neck supple. No edema or erythema. Normal range of motion. Lymphadenopathy:      Head:      Right side of head: No submental, submandibular, tonsillar, preauricular, posterior auricular or occipital adenopathy. Left side of head: No submental, submandibular, tonsillar, preauricular, posterior auricular or occipital adenopathy. Cervical: No cervical adenopathy. Right cervical: No superficial, deep or posterior cervical adenopathy. Left cervical: No superficial, deep or posterior cervical adenopathy. Upper Body:      Right upper body: No supraclavicular or pectoral adenopathy. Left upper body: No supraclavicular or pectoral adenopathy. Skin:     General: Skin is warm and dry. Coloration: Skin is not pale. Findings: No bruising, ecchymosis, laceration, lesion or rash. Nails: There is no clubbing. Neurological:      Mental Status: He is alert and oriented to person, place, and time. Cranial Nerves: No cranial nerve deficit. Motor: No abnormal muscle tone. Coordination: Coordination normal.      Deep Tendon Reflexes: Reflexes normal.      Comments: Negative pedal exam   Psychiatric:         Speech: Speech normal.         Behavior: Behavior normal.         Thought Content:  Thought content normal.         Judgment: Judgment normal.     /70   Pulse 67   Temp 98.1 °F (36.7 °C) (Oral)   Resp 16   Wt 183 lb (83 kg)   SpO2 97%   BMI 24.82 kg/m²     Component      Latest Ref Rng & Units 2/17/2023 8/13/2022   Glucose, Random      70 - 99 mg/dL 167 (H) 178 (H)   BUN,BUNPL      8 - 23 mg/dL 9 12   Creatinine      0.80 - 1.40 mg/dL 0.84 0.75 (L)   EGFR IF NonAfrican American      >60 mL/min/1.73 97 100   CALCIUM, SERUM, 339876      8.5 - 10.5 mg/dL 9.6 9.9   Sodium      133 - 146 meq/L 142 142   Potassium      3.5 - 5.4 meq/L 4.5 4.2   Chloride      95 - 107 meq/L 104 102   CO2      19 - 31 meq/L 28 28   Anion Gap      7.0 - 16.0 meq/L 10.0 12.0   BILIRUBIN TOTAL      <=1.2 mg/dL 0.7 0.6 Alk Phosphatase      40 - 123 U/L 100 107   AST      9 - 50 U/L 20 16   ALT      5 - 50 U/L 24 18   Total Protein      6.1 - 8.3 g/dL 6.6 7.0   Albumin      3.5 - 5.2 g/dL 4.8 5.0   Cholesterol      <200 mg/dL 169 186   Triglycerides      <149 mg/dL 77 102   HDL Cholesterol      >39 mg/dL 50 48   LDL Calculated      <100 mg/dL 104 (H) 118 (H)   VLDL Cholesterol Calculated      <30 mg/dL 15 20   LDL/HDL Ratio      <3.55 RATIO 2.1 2.5   Chol/HDL Ratio      <4.97 RATIO 3.4 3.9   Hemoglobin A1C      4.2 - 5.6 % 7.1 (H) 6.7 (H)   AVERAGE GLUCOSE      <117 mg/dL 157 (H) 146 (H)   T4 Free      0.80 - 1.90 ng/dL  1.35   TSH      0.400 - 4.10 uIu/mL  1.770   PSA, Ultrasensitive      <=4.00 ng/mL  1.33   Reviewed with patient  Impression/Plan:  1. Type 2 diabetes mellitus with hyperglycemia, without long-term current use of insulin ([facility]    2. DM type 2, goal HbA1c < 7% ([facility])    3. Essential hypertension    4. Insomnia, unspecified type    5. RLS (restless legs syndrome)    6. Depression, unspecified depression type    7. Atrial fibrillation with RVR ([facility])      Requested Prescriptions     Signed Prescriptions Disp Refills    Semaglutide, 2 MG/DOSE, (OZEMPIC, 2 MG/DOSE,) 8 MG/3ML SOPN 9 mL 2     Sig: Inject 2 mg into the skin once a week     Orders Placed This Encounter   Procedures    Pneumococcal, PCV20, PREVNAR 20, (age 25 yrs+), IM, PF    Hemoglobin A1C     Standing Status:   Future     Standing Expiration Date:   2/21/2024    Comprehensive Metabolic Panel     Standing Status:   Future     Standing Expiration Date:   2/21/2024     DIABETES FOOT EXAM     Prevnar 20 updated today. Increase Ozempic to 2 mg injection weekly. A1c CMP 3 months. Low-carb diet. Continue medications as prescribed. Educational information on above diagnosis  provided per AVS.    Patient giveneducational materials - see patient instructions. Discussed use, benefit, and side effects of prescribed medications. All patient questions answered. Pt voiced understanding. Reviewed health maintenance. Patient agreedwith treatment plan. Follow up as directed.    30 min       Electronically signed by ARTURO Camejo CNP on 2/22/2023 at 10:00 AM

## 2023-02-24 ENCOUNTER — OFFICE VISIT (OUTPATIENT)
Dept: CARDIOLOGY CLINIC | Age: 68
End: 2023-02-24
Payer: COMMERCIAL

## 2023-02-24 VITALS
SYSTOLIC BLOOD PRESSURE: 154 MMHG | BODY MASS INDEX: 25.17 KG/M2 | WEIGHT: 185.8 LBS | DIASTOLIC BLOOD PRESSURE: 78 MMHG | HEART RATE: 77 BPM | HEIGHT: 72 IN

## 2023-02-24 DIAGNOSIS — I48.0 PAROXYSMAL ATRIAL FIBRILLATION (HCC): Primary | ICD-10-CM

## 2023-02-24 DIAGNOSIS — I10 PRIMARY HYPERTENSION: ICD-10-CM

## 2023-02-24 PROCEDURE — 3077F SYST BP >= 140 MM HG: CPT | Performed by: NUCLEAR MEDICINE

## 2023-02-24 PROCEDURE — 99213 OFFICE O/P EST LOW 20 MIN: CPT | Performed by: NUCLEAR MEDICINE

## 2023-02-24 PROCEDURE — 1124F ACP DISCUSS-NO DSCNMKR DOCD: CPT | Performed by: NUCLEAR MEDICINE

## 2023-02-24 PROCEDURE — 3078F DIAST BP <80 MM HG: CPT | Performed by: NUCLEAR MEDICINE

## 2023-02-24 NOTE — PROGRESS NOTES
[address] [facility] [place] [address]  [facility] [address]  Dept: [phone]  Dept Fax: [phone]  Loc: [phone]    Visit Date: 2023    Niki Mauricio is a 74 y.o. male who presents todayfor:  Chief Complaint   Patient presents with    Check-Up    Hypertension    Atrial Fibrillation     Admitted for A fib   New onset  Known factor 5 def  Now on eliquis   No chest pain  No changes in breathing  BP is stable  No dizziness  No syncope      HPI:  HPI  Past Medical History:   Diagnosis Date    BPH (benign prostatic hyperplasia)     Depression     Diabetes mellitus ([facility]     Factor 5 Leiden mutation, heterozygous ([facility]     Heterozygous MTHFR mutation [id number]     Hyperglycemia     Hypertension     Hypertension     Osteoarthritis     Sleep apnea     Stroke ([facility] 2013    Type II or unspecified type diabetes mellitus without mention of complication, not stated as uncontrolled 2012    Visual field cut 2013    Right visual field cut      Past Surgical History:   Procedure Laterality Date    ABDOMEN SURGERY      APPENDECTOMY      CERVICAL FUSION  08    COLONOSCOPY  12    [address]    TONSILLECTOMY      UMBILICAL HERNIA REPAIR  2012  Dr Frankey Herter     Family History   Problem Relation Age of Onset    Diabetes Father     Diabetes Brother     Stroke Brother      Social History     Tobacco Use    Smoking status: Former     Packs/day: 0.50     Years: 40.00     Pack years: 20.00     Types: E-Cigarettes, Cigarettes     Quit date: 2013     Years since quittin.5    Smokeless tobacco: Never    Tobacco comments:     States he uses Vap   Substance Use Topics    Alcohol use:  Yes     Alcohol/week: 0.0 standard drinks     Comment: social      Current Outpatient Medications   Medication Sig Dispense Refill    Semaglutide, 2 MG/DOSE, (OZEMPIC, 2 MG/DOSE,) 8 MG/3ML SOPN Inject 2 mg into the skin once a week 9 mL 2    traZODone (DESYREL) 50 MG tablet take 1 tablet by mouth nightly 30 tablet 1    pramipexole (MIRAPEX) 0.125 MG tablet take 1 tablet by mouth nightly 30 tablet 3    VORTIoxetine (TRINTELLIX) 10 MG TABS tablet take 1 tablet by mouth once daily 90 tablet 2    benazepril (LOTENSIN) 40 MG tablet TAKE 1 TABLET BY MOUTH DAILY 90 tablet 2    Metoprolol Tartrate 75 MG TABS Take 75 mg by mouth 2 times daily 60 tablet 4    apixaban (ELIQUIS) 5 MG TABS tablet take 1 tablet by mouth twice a day 180 tablet 1    COLLAGEN PO Take by mouth      empagliflozin (JARDIANCE) 25 MG tablet Take 1 tablet by mouth in the morning. 90 tablet 2    Blood Glucose Monitoring Suppl (ACCU-CHEK GUIDE) w/Device KIT CHECK BLOOD SUGAR DAILY      TRUEplus Lancets 33G MISC USE TWICE A  each 3    blood glucose test strips (ACCU-CHEK SMARTVIEW) strip CHECK BLOOD SUGAR TWICE A DAY AS NEEDED FOR SYMPTOMS OF IRREGULAR BLOOD GLUCOSE 100 strip 3    Apple Sudhir Vn-Grn Tea-Bit Or-Cr (APPLE CIDER VINEGAR PLUS) TABS Take by mouth      ACCU-CHEK SMARTVIEW strip TEST BLOOD SUGAR ONCE A DAY 50 strip 11    Cholecalciferol (VITAMIN D3) 2000 units CAPS Take by mouth daily       latanoprost (XALATAN) 0.005 % ophthalmic solution Place 1 drop into both eyes daily       Esomeprazole Magnesium (NEXIUM 24HR PO) Take by mouth daily       OLIVE LEAF PO Take by mouth       NONFORMULARY daily PROSTATE SUPPORT      multivitamin (THERAGRAN) per tablet Take 1 tablet by mouth daily. Lancets MISC Use qd  Dx:  250.02 50 each 11    cetirizine (ZYRTEC) 10 MG tablet Take 10 mg by mouth daily. No current facility-administered medications for this visit.      No Known Allergies  Health Maintenance   Topic Date Due    HIV screen  Never done    Diabetic Alb to Cr ratio (uACR) test  Never done    DTaP/Tdap/Td vaccine (1 - Tdap) Never done    Low dose CT lung screening  Never done    AAA screen  Never done    Flu vaccine (1) 08/01/2022    Shingles vaccine (2 of 2) 12/02/2022    Diabetic retinal exam 04/29/2023    A1C test (Diabetic or Prediabetic)  02/17/2024    Lipids  02/17/2024    GFR test (Diabetes, CKD 3-4, OR last GFR 15-59)  02/17/2024    Diabetic foot exam  02/21/2024    Depression Monitoring  02/21/2024    Colorectal Cancer Screen  09/08/2027    Pneumococcal 65+ years Vaccine  Completed    COVID-19 Vaccine  Completed    Hepatitis C screen  Completed    Hepatitis A vaccine  Aged Out    Hib vaccine  Aged Out    Meningococcal (ACWY) vaccine  Aged Out       Subjective:  General:   No fever, no chills, No fatigue or weight loss  Pulmonary:    No dyspnea, no wheezing  Cardiac:    Denies recent chest pain,   GI:     No nausea or vomiting, no abdominal pain  Neuro:    No dizziness or light headedness,   Musculoskeletal:  No recent active issues  Extremities:   No edema, no obvious claudication       Objective:  General:   Well developed, well nourished  Lungs:   Clear to auscultation  Heart:    Normal S1 S2, Slight murmur. no rubs, no gallops  Abdomen:   Soft, non tender, no organomegalies, positive bowel sounds  Extremities:   No edema, no cyanosis, good peripheral pulses  Neurological:   Awake, alert, oriented. No obvious focal deficits  Musculoskelatal:  No obvious deformities   BP (!) 154/78   Pulse 77   Ht 6' (1.829 m)   Wt 185 lb 12.8 oz (84.3 kg)   BMI 25.20 kg/m²     Assessment:      Diagnosis Orders   1. Paroxysmal atrial fibrillation (HCC)        2. Primary hypertension        As above  Cardiac fair for now     Plan:  No follow-ups on file. As above  Continue risk factor modification and medical management  Thank you for allowing me to participate in the care of your patient. Please don't hesitate to contact me regarding any further issues related to the patient care    Orders Placed:  No orders of the defined types were placed in this encounter. Prescribed:  No orders of the defined types were placed in this encounter.          Discussed use, benefit, and side effects of prescribed medications. All patient questions answered. Pt voicedunderstanding. Instructed to continue current medications, diet and exercise. Continue risk factor modification and medical management. Patient agreed with treatment plan. Follow up as directed.     Electronically signedby Tl Orellana MD on 2/24/2023 at 8:58 AM

## 2023-04-03 RX ORDER — PRAMIPEXOLE DIHYDROCHLORIDE 0.12 MG/1
0.12 TABLET ORAL NIGHTLY
Qty: 30 TABLET | Refills: 3 | Status: SHIPPED | OUTPATIENT
Start: 2023-04-03

## 2023-04-03 RX ORDER — TRAZODONE HYDROCHLORIDE 50 MG/1
TABLET ORAL
Qty: 30 TABLET | Refills: 1 | Status: SHIPPED | OUTPATIENT
Start: 2023-04-03

## 2023-04-17 RX ORDER — METOPROLOL TARTRATE 75 MG/1
TABLET, FILM COATED ORAL
Qty: 180 TABLET | Refills: 3 | Status: SHIPPED | OUTPATIENT
Start: 2023-04-17

## 2023-04-25 ENCOUNTER — OFFICE VISIT (OUTPATIENT)
Dept: FAMILY MEDICINE CLINIC | Age: 68
End: 2023-04-25
Payer: COMMERCIAL

## 2023-04-25 VITALS
WEIGHT: 184 LBS | DIASTOLIC BLOOD PRESSURE: 60 MMHG | TEMPERATURE: 97.9 F | RESPIRATION RATE: 16 BRPM | SYSTOLIC BLOOD PRESSURE: 110 MMHG | HEART RATE: 72 BPM | BODY MASS INDEX: 24.95 KG/M2

## 2023-04-25 DIAGNOSIS — J30.2 SEASONAL ALLERGIC RHINITIS, UNSPECIFIED TRIGGER: ICD-10-CM

## 2023-04-25 DIAGNOSIS — H65.03 BILATERAL ACUTE SEROUS OTITIS MEDIA, RECURRENCE NOT SPECIFIED: Primary | ICD-10-CM

## 2023-04-25 PROCEDURE — 3074F SYST BP LT 130 MM HG: CPT | Performed by: NURSE PRACTITIONER

## 2023-04-25 PROCEDURE — 1124F ACP DISCUSS-NO DSCNMKR DOCD: CPT | Performed by: NURSE PRACTITIONER

## 2023-04-25 PROCEDURE — 99213 OFFICE O/P EST LOW 20 MIN: CPT | Performed by: NURSE PRACTITIONER

## 2023-04-25 PROCEDURE — 96372 THER/PROPH/DIAG INJ SC/IM: CPT | Performed by: NURSE PRACTITIONER

## 2023-04-25 PROCEDURE — 3078F DIAST BP <80 MM HG: CPT | Performed by: NURSE PRACTITIONER

## 2023-04-25 RX ORDER — METHYLPREDNISOLONE ACETATE 80 MG/ML
120 INJECTION, SUSPENSION INTRA-ARTICULAR; INTRALESIONAL; INTRAMUSCULAR; SOFT TISSUE ONCE
Status: COMPLETED | OUTPATIENT
Start: 2023-04-25 | End: 2023-04-25

## 2023-04-25 RX ORDER — GLUCOSAM/CHON-MSM1/C/MANG/BOSW 500-416.6
TABLET ORAL
Qty: 100 EACH | Refills: 3 | Status: SHIPPED | OUTPATIENT
Start: 2023-04-25

## 2023-04-25 RX ORDER — BLOOD SUGAR DIAGNOSTIC
STRIP MISCELLANEOUS
Qty: 100 STRIP | Refills: 3 | Status: SHIPPED | OUTPATIENT
Start: 2023-04-25

## 2023-04-25 RX ORDER — CETIRIZINE HYDROCHLORIDE 5 MG/1
5 TABLET ORAL NIGHTLY
Qty: 30 TABLET | Refills: 0 | Status: SHIPPED | OUTPATIENT
Start: 2023-04-25

## 2023-04-25 RX ADMIN — METHYLPREDNISOLONE ACETATE 120 MG: 80 INJECTION, SUSPENSION INTRA-ARTICULAR; INTRALESIONAL; INTRAMUSCULAR; SOFT TISSUE at 13:46

## 2023-04-25 ASSESSMENT — ENCOUNTER SYMPTOMS
COUGH: 0
BACK PAIN: 0
SINUS PRESSURE: 1
ABDOMINAL DISTENTION: 0
WHEEZING: 0
SHORTNESS OF BREATH: 0
ABDOMINAL PAIN: 0
CHEST TIGHTNESS: 0

## 2023-04-25 NOTE — PROGRESS NOTES
Administrations This Visit       methylPREDNISolone acetate (DEPO-MEDROL) injection 120 mg       Admin Date  04/25/2023  13:46 Action  Given Dose  120 mg Route  IntraMUSCular Site  Dorsogluteal Left Administered By  Jimbo Villafana CMA    Ordering Provider: ARTURO Maki CNP    NDC: 2636-7139-34    Lot#: [id number]    : [employer].     Patient Supplied?: No

## 2023-04-25 NOTE — PROGRESS NOTES
[address] [place] [address]  Dept: [phone]  Dept Fax: [phone]  Loc: [phone]  PROGRESS NOTE      VisitDate: 4/25/2023    Elizabeth Sunshine is a 74 y.o. male who presents today for:     Chief Complaint   Patient presents with    Ear Fullness     Ear feels muffled, some ringing x1 week- has allergies and asked for an allergy shot (depo medrol)         Subjective:  Patient presents with complaint of sinus congestion ear fullness ringing muffled hearing right worse than left for the past 10 days. Denies fever chest pain shortness of breath. Taking medications as prescribed. History of type 2 diabetes CVA sleep apnea hypertension hyperlipidemia osteoarthritis factor V Leiden mutation depression. Mood stable. Review of Systems   Constitutional:  Negative for activity change, appetite change, chills, fatigue and fever. HENT:  Positive for congestion, hearing loss, sinus pressure and tinnitus. Negative for ear discharge and ear pain. Eyes:  Negative for visual disturbance. Respiratory:  Negative for cough, chest tightness, shortness of breath and wheezing. Cardiovascular:  Negative for chest pain, palpitations and leg swelling. Gastrointestinal:  Negative for abdominal distention and abdominal pain. Genitourinary:  Negative for dysuria. Musculoskeletal:  Negative for arthralgias, back pain and neck pain. Skin: Negative. Negative for rash. Neurological:  Negative for dizziness, light-headedness and headaches. Hematological:  Negative for adenopathy. Psychiatric/Behavioral:  Negative for decreased concentration and dysphoric mood. All other systems reviewed and are negative.   Past Medical History:   Diagnosis Date    BPH (benign prostatic hyperplasia)     Depression     Diabetes mellitus ([facility]     Factor 5 Leiden mutation, heterozygous (HCC)     Heterozygous MTHFR mutation [id number]

## 2023-05-01 ENCOUNTER — TELEPHONE (OUTPATIENT)
Dept: FAMILY MEDICINE CLINIC | Age: 68
End: 2023-05-01

## 2023-05-01 RX ORDER — BENZONATATE 200 MG/1
200 CAPSULE ORAL 3 TIMES DAILY PRN
Qty: 30 CAPSULE | Refills: 0 | Status: SHIPPED | OUTPATIENT
Start: 2023-05-01 | End: 2023-05-08

## 2023-05-01 RX ORDER — PREDNISONE 10 MG/1
10 TABLET ORAL 2 TIMES DAILY
Qty: 10 TABLET | Refills: 0 | Status: SHIPPED | OUTPATIENT
Start: 2023-05-01 | End: 2023-05-06

## 2023-05-01 NOTE — TELEPHONE ENCOUNTER
Paxlovid prednisone Tessalon also sent in for patient.   Also recommend that he only take half of a tablet of his Eliquis while on Paxlovid

## 2023-05-01 NOTE — TELEPHONE ENCOUNTER
Patient tested positive for covid on Saturday. Sx started on Thurs with cough, fever, weakness, body aches. I recommended he take Mucinex. Any further recommendations?

## 2023-05-05 RX ORDER — EMPAGLIFLOZIN 25 MG/1
TABLET, FILM COATED ORAL
Qty: 90 TABLET | Refills: 2 | Status: SHIPPED | OUTPATIENT
Start: 2023-05-05

## 2023-05-16 ENCOUNTER — OFFICE VISIT (OUTPATIENT)
Dept: FAMILY MEDICINE CLINIC | Age: 68
End: 2023-05-16
Payer: COMMERCIAL

## 2023-05-16 VITALS
TEMPERATURE: 97.5 F | RESPIRATION RATE: 16 BRPM | OXYGEN SATURATION: 97 % | WEIGHT: 176 LBS | SYSTOLIC BLOOD PRESSURE: 128 MMHG | DIASTOLIC BLOOD PRESSURE: 64 MMHG | HEART RATE: 71 BPM | BODY MASS INDEX: 23.87 KG/M2

## 2023-05-16 DIAGNOSIS — H69.81 EUSTACHIAN TUBE DYSFUNCTION, RIGHT: ICD-10-CM

## 2023-05-16 DIAGNOSIS — H65.21 RIGHT CHRONIC SEROUS OTITIS MEDIA: ICD-10-CM

## 2023-05-16 DIAGNOSIS — H91.91 DECREASED HEARING, RIGHT: Primary | ICD-10-CM

## 2023-05-16 PROCEDURE — 3074F SYST BP LT 130 MM HG: CPT | Performed by: NURSE PRACTITIONER

## 2023-05-16 PROCEDURE — 99213 OFFICE O/P EST LOW 20 MIN: CPT | Performed by: NURSE PRACTITIONER

## 2023-05-16 PROCEDURE — 3078F DIAST BP <80 MM HG: CPT | Performed by: NURSE PRACTITIONER

## 2023-05-16 PROCEDURE — 1124F ACP DISCUSS-NO DSCNMKR DOCD: CPT | Performed by: NURSE PRACTITIONER

## 2023-05-16 RX ORDER — PREDNISONE 10 MG/1
TABLET ORAL
Qty: 30 TABLET | Refills: 0 | Status: SHIPPED | OUTPATIENT
Start: 2023-05-16 | End: 2023-05-26

## 2023-05-16 RX ORDER — NEOMYCIN SULFATE, POLYMYXIN B SULFATE AND HYDROCORTISONE 10; 3.5; 1 MG/ML; MG/ML; [USP'U]/ML
3 SUSPENSION/ DROPS AURICULAR (OTIC) 3 TIMES DAILY
Qty: 10 ML | Refills: 0 | Status: SHIPPED | OUTPATIENT
Start: 2023-05-16 | End: 2023-05-23

## 2023-05-16 RX ORDER — AZITHROMYCIN 250 MG/1
TABLET, FILM COATED ORAL
Qty: 6 TABLET | Refills: 1 | Status: SHIPPED | OUTPATIENT
Start: 2023-05-16 | End: 2023-05-26

## 2023-05-17 ENCOUNTER — TELEPHONE (OUTPATIENT)
Dept: ADMINISTRATIVE | Facility: CLINIC | Age: 68
End: 2023-05-17

## 2023-05-19 ASSESSMENT — ENCOUNTER SYMPTOMS
CHEST TIGHTNESS: 0
COUGH: 0
SHORTNESS OF BREATH: 0
ABDOMINAL DISTENTION: 0
WHEEZING: 0
BACK PAIN: 0
ABDOMINAL PAIN: 0
RHINORRHEA: 1

## 2023-05-19 NOTE — PROGRESS NOTES
Heterozygous MTHFR mutation [id number]     Hyperglycemia     Hypertension     Hypertension     Osteoarthritis     Sleep apnea     Stroke ([address] 2013    Type II or unspecified type diabetes mellitus without mention of complication, not stated as uncontrolled 2012    Visual field cut 2013    Right visual field cut      Past Surgical History:   Procedure Laterality Date    ABDOMEN SURGERY      APPENDECTOMY      CERVICAL FUSION  08    COLONOSCOPY  12    [address]     TONSILLECTOMY      UMBILICAL HERNIA REPAIR  2012  Dr Robin Rueda     Family History   Problem Relation Age of Onset    Diabetes Father     Diabetes Brother     Stroke Brother      Social History     Tobacco Use    Smoking status: Former     Packs/day: 0.50     Years: 40.00     Pack years: 20.00     Types: E-Cigarettes, Cigarettes     Quit date: 2013     Years since quittin.7    Smokeless tobacco: Never    Tobacco comments:     States he uses Vap   Substance Use Topics    Alcohol use: Yes     Alcohol/week: 0.0 standard drinks     Comment: social      Current Outpatient Medications   Medication Sig Dispense Refill    predniSONE (DELTASONE) 10 MG tablet 4 po qd for 3 days, then 3 po qd for 3 days, then 2 po qd for 3 days, then 1 po qd for 3 days 30 tablet 0    azithromycin (ZITHROMAX Z-RAMIRO) 250 MG tablet 2 pills orally for 1 day, then 1 pill orally for 4 days 6 tablet 1    neomycin-polymyxin-hydrocortisone (CORTISPORIN) 3.5-60380-9 otic suspension Place 3 drops into the right ear 3 times daily for 7 days 10 mL 0    JARDIANCE 25 MG tablet Take 1 tablet by mouth in the morning.  90 tablet 2    blood glucose test strips (ACCU-CHEK SMARTVIEW) strip CHECK BLOOD SUGAR TWICE A DAY AS NEEDED FOR SYMPTOMS OF IRREGULAR BLOOD GLUCOSE 100 strip 3    TRUEplus Lancets 33G MISC USE TWICE A  each 3    cetirizine (ZYRTEC) 5 MG tablet Take 1 tablet by mouth at bedtime 30 tablet 0    Metoprolol Tartrate 75 MG TABS TAKE 1 TABLET TWICE A  tablet

## 2023-05-23 ENCOUNTER — TELEPHONE (OUTPATIENT)
Dept: FAMILY MEDICINE CLINIC | Age: 68
End: 2023-05-23

## 2023-05-23 DIAGNOSIS — H65.03 BILATERAL ACUTE SEROUS OTITIS MEDIA, RECURRENCE NOT SPECIFIED: ICD-10-CM

## 2023-05-23 DIAGNOSIS — H91.91 DECREASED HEARING, RIGHT: Primary | ICD-10-CM

## 2023-05-23 DIAGNOSIS — H69.81 EUSTACHIAN TUBE DYSFUNCTION, RIGHT: ICD-10-CM

## 2023-05-23 NOTE — TELEPHONE ENCOUNTER
Nicole wife was here for an appointment with Lew Mensah and states his appointment with [facility] is not until August and request someone sooner. A referral was faxed to Dr. Mat Capps. They will call the pt to schedule. Wife states to keep appointment with [facility] and she will cancel if needed.

## 2023-07-12 LAB
ALBUMIN SERPL-MCNC: 4.9 G/DL (ref 3.5–5.2)
ALK PHOSPHATASE: 100 U/L (ref 40–125)
ALT SERPL-CCNC: 22 U/L (ref 5–50)
ANION GAP SERPL CALCULATED.3IONS-SCNC: 11 MEQ/L (ref 7–16)
AST SERPL-CCNC: 15 U/L (ref 9–50)
AVERAGE GLUCOSE: 183 MG/DL
BILIRUB SERPL-MCNC: 0.5 MG/DL
BUN BLDV-MCNC: 14 MG/DL (ref 8–23)
CALCIUM SERPL-MCNC: 9.5 MG/DL (ref 8.5–10.5)
CHLORIDE BLD-SCNC: 102 MEQ/L (ref 95–107)
CO2: 25 MEQ/L (ref 19–31)
CREAT SERPL-MCNC: 0.72 MG/DL (ref 0.8–1.4)
EGFR IF NONAFRICAN AMERICAN: 101 ML/MIN/1.73
GLUCOSE: 163 MG/DL (ref 70–99)
HBA1C MFR BLD: 8 % (ref 4.2–5.6)
POTASSIUM SERPL-SCNC: 4.4 MEQ/L (ref 3.5–5.4)
SODIUM BLD-SCNC: 138 MEQ/L (ref 133–146)
TOTAL PROTEIN: 6.8 G/DL (ref 6.1–8.3)

## 2023-07-14 RX ORDER — BENAZEPRIL HYDROCHLORIDE 40 MG/1
40 TABLET, FILM COATED ORAL DAILY
Qty: 90 TABLET | Refills: 2 | Status: SHIPPED | OUTPATIENT
Start: 2023-07-14

## 2023-07-20 ENCOUNTER — HOSPITAL ENCOUNTER (OUTPATIENT)
Dept: AUDIOLOGY | Age: 68
Discharge: HOME | End: 2023-07-20
Payer: COMMERCIAL

## 2023-07-20 PROCEDURE — 92557 COMPREHENSIVE HEARING TEST: CPT | Performed by: AUDIOLOGIST

## 2023-07-20 PROCEDURE — 92567 TYMPANOMETRY: CPT | Performed by: AUDIOLOGIST

## 2023-07-20 NOTE — PROGRESS NOTES
AUDIOLOGICAL EVALUATION      REASON FOR TESTING:  Audiometric evaluation per the request of SIDDHARTHA Case, due to the diagnosis of middle ear effusion and eustachian tube dysfunction. Terrence Salter complains of his hearing being muffled in the right ear for approximately 6 months. Recently he has had fluid in the middle ear space and has been treated with medication. Terrence Salter experiences occasional dizziness when bending over. He also has bilateral tinnitus intermittently. Terrence Salter explains that he had a stroke 7-9 years ago that affected his vision on the right side. He denies otalgia, noise exposure, and aural fullness. Previous audiogram obtained 11/3/17 indicated a mild low frequency rising to normal  sloping to moderate mixed hearing loss loss in the right ear and a normal low frequency sloping to moderate high frequency mixed hearing loss for the left ear. Previous brain MRI and head CT studies noted in chart 8/08/2013. OTOSCOPY: Minimal non-occluding cerumen with normal appearing tympanic membrane of the left ear and a dull tympanic membrane of the right ear. AUDIOGRAM          Reliability: Good    COMMENTS: Normal hearing sensitivity in the left ear from 250-1000 Hz sloping to a mild to moderate sensorineural hearing loss in the higher frequencies. Right ear thresholds indicated a moderate rising to mild sloping to moderate sensorineural hearing loss. Speech reception thresholds were obtained at 20dB in the left ear and 35dB in the right ear, which agrees with pure tone averages. Word recognition scores were excellent at 100% in the left ear and 96% in the right ear with speech presented at 70dB in quiet. Tympanometry revealed normal peak pressure and normal middle ear compliance for both ears. Decreased hearing sensitivity and worsened asymmetry for the right ear when compared to previous audiogram from 11/03/2017 (below).       RECOMMENDATION(S):   1- Continue care with SIDDHARTHA Case, as

## 2023-08-07 RX ORDER — PRAMIPEXOLE DIHYDROCHLORIDE 0.12 MG/1
0.12 TABLET ORAL NIGHTLY
Qty: 30 TABLET | Refills: 3 | Status: SHIPPED | OUTPATIENT
Start: 2023-08-07

## 2023-08-07 RX ORDER — TRAZODONE HYDROCHLORIDE 50 MG/1
TABLET ORAL
Qty: 30 TABLET | Refills: 1 | Status: SHIPPED | OUTPATIENT
Start: 2023-08-07

## 2023-08-08 ENCOUNTER — TELEPHONE (OUTPATIENT)
Dept: FAMILY MEDICINE CLINIC | Age: 68
End: 2023-08-08

## 2023-08-08 NOTE — TELEPHONE ENCOUNTER
Wing Lockwood was informed and request Rx be sent to [facility]. Ok for 2ml with 2rf per MAHOGANY ZENDEJAS.Eusebio LAI. Rx was escribed.

## 2023-08-08 NOTE — TELEPHONE ENCOUNTER
Patient wife called stating its been since 07/30/2023 since patient has had his ozempic- the pharmacies are just out of stock and may or may not get some in the end of august. She needs to know what he could be on to substitute this in the meantime.

## 2023-08-22 ENCOUNTER — OFFICE VISIT (OUTPATIENT)
Dept: FAMILY MEDICINE CLINIC | Age: 68
End: 2023-08-22
Payer: COMMERCIAL

## 2023-08-22 VITALS
TEMPERATURE: 97.7 F | HEART RATE: 66 BPM | RESPIRATION RATE: 16 BRPM | WEIGHT: 169 LBS | SYSTOLIC BLOOD PRESSURE: 130 MMHG | OXYGEN SATURATION: 98 % | DIASTOLIC BLOOD PRESSURE: 80 MMHG | BODY MASS INDEX: 22.92 KG/M2

## 2023-08-22 DIAGNOSIS — G47.00 INSOMNIA, UNSPECIFIED TYPE: ICD-10-CM

## 2023-08-22 DIAGNOSIS — D68.69 SECONDARY HYPERCOAGULABLE STATE (HCC): ICD-10-CM

## 2023-08-22 DIAGNOSIS — I10 ESSENTIAL HYPERTENSION: ICD-10-CM

## 2023-08-22 DIAGNOSIS — F32.A DEPRESSION, UNSPECIFIED DEPRESSION TYPE: ICD-10-CM

## 2023-08-22 DIAGNOSIS — I48.91 ATRIAL FIBRILLATION WITH RVR (HCC): ICD-10-CM

## 2023-08-22 DIAGNOSIS — E11.65 TYPE 2 DIABETES MELLITUS WITH HYPERGLYCEMIA, WITHOUT LONG-TERM CURRENT USE OF INSULIN (HCC): Primary | ICD-10-CM

## 2023-08-22 DIAGNOSIS — G25.81 RLS (RESTLESS LEGS SYNDROME): ICD-10-CM

## 2023-08-22 PROCEDURE — 3074F SYST BP LT 130 MM HG: CPT | Performed by: NURSE PRACTITIONER

## 2023-08-22 PROCEDURE — 3052F HG A1C>EQUAL 8.0%<EQUAL 9.0%: CPT | Performed by: NURSE PRACTITIONER

## 2023-08-22 PROCEDURE — 3078F DIAST BP <80 MM HG: CPT | Performed by: NURSE PRACTITIONER

## 2023-08-22 PROCEDURE — 99213 OFFICE O/P EST LOW 20 MIN: CPT | Performed by: NURSE PRACTITIONER

## 2023-08-22 PROCEDURE — 1124F ACP DISCUSS-NO DSCNMKR DOCD: CPT | Performed by: NURSE PRACTITIONER

## 2023-08-22 RX ORDER — SEMAGLUTIDE 2.68 MG/ML
INJECTION, SOLUTION SUBCUTANEOUS
COMMUNITY
Start: 2023-08-14

## 2023-09-29 ENCOUNTER — TELEPHONE (OUTPATIENT)
Dept: FAMILY MEDICINE CLINIC | Age: 68
End: 2023-09-29

## 2023-09-29 DIAGNOSIS — R07.81 RIB PAIN ON RIGHT SIDE: Primary | ICD-10-CM

## 2023-09-29 NOTE — TELEPHONE ENCOUNTER
Right rib side pain- (hurts to breath on right side)    Symptoms started last weekend asked about chest Xray he thought was going to be ordered at his last appointment.

## 2023-10-02 RX ORDER — TRAZODONE HYDROCHLORIDE 50 MG/1
TABLET ORAL
Qty: 90 TABLET | Refills: 3 | Status: SHIPPED | OUTPATIENT
Start: 2023-10-02

## 2023-10-09 ENCOUNTER — HOSPITAL ENCOUNTER (OUTPATIENT)
Age: 68
Discharge: HOME | End: 2023-10-09
Payer: COMMERCIAL

## 2023-10-09 ENCOUNTER — HOSPITAL ENCOUNTER (OUTPATIENT)
Dept: GENERAL RADIOLOGY | Age: 68
Discharge: HOME | End: 2023-10-09
Payer: COMMERCIAL

## 2023-10-09 DIAGNOSIS — R07.81 RIB PAIN ON RIGHT SIDE: ICD-10-CM

## 2023-10-09 PROCEDURE — 71046 X-RAY EXAM CHEST 2 VIEWS: CPT

## 2023-10-11 NOTE — PROGRESS NOTES
[facility]      Cabrera Rodriguez         [id number]  10/12/2023   Chief Complaint   Patient presents with    Follow-up     1yr CSA f/u w/[facility] download. Doing well. Needs script for PAP supplies. Pt of Dr. Haley Zavaleta    PAP Download:   Original or initial AHI: 35.4     Date of initial study: 11/14/2010      Compliant  100%     Noncompliant 0 %     PAP Type ASV    Level  Min EPAP6 Max EPAP 15 cmH2O with min PS 0 and Max PS 18  Avg Hrs/Day 7hrs 13mins  AHI: 2.5   Recorded compliance dates: 9/11/23 to 10/10/23  Machine/Mfg:   [x] ResMed    [] Respironics/Dreamstation   Interface:   [] Nasal    [x] Nasal pillows   [] FFM      Provider:      [] -HME     [x][facility]     [] Mohamud    [] Latasha Del Rio    [] Tristian               [] [facility]      [] Adaptive    [] [facility]:      [] Other    Neck Size: 16  Mallampati 4  ESS:  3  SAQLI: 95    Here is a scan of the most recent download:            Presentation:   Stephen Valdivia presents for sleep medicine follow up for central sleep apnea  Since the last visit, Stephen Valdivia is doing well with his machine. He reports that he has had some leak that improved with tightening his straps. He admits when he retired about 1.5 yrs ago he started staying up later. He now goes to bed around 2AM and wakes up around 12P. He reports no issues with his sleep. Weight lost 4 lbs over 1 year    Equipment issues: The pressure is  acceptable, the mask is acceptable     Sleep issues:  Do you feel better? Yes  More rested? Yes   Better concentration? yes  Difficulty falling sleep? No  Difficulty staying asleep? No  Snoring? No    Progress History:   Since last visit any new medical issues? No  New ER or hospital visits? No  Any new or changes in medicines? No  Any new sleep medicines? No    Review of Systems -   Review of Systems   Constitutional:  Negative for appetite change, fever and unexpected weight change.    HENT:  Negative for congestion,

## 2023-10-12 ENCOUNTER — OFFICE VISIT (OUTPATIENT)
Dept: PULMONOLOGY | Age: 68
End: 2023-10-12
Payer: COMMERCIAL

## 2023-10-12 VITALS
HEIGHT: 72 IN | DIASTOLIC BLOOD PRESSURE: 72 MMHG | BODY MASS INDEX: 23.46 KG/M2 | WEIGHT: 173.2 LBS | OXYGEN SATURATION: 100 % | SYSTOLIC BLOOD PRESSURE: 126 MMHG | TEMPERATURE: 97.8 F | HEART RATE: 64 BPM

## 2023-10-12 DIAGNOSIS — R06.3 CENTRAL SLEEP APNEA DUE TO CHEYNE-STOKES RESPIRATION: Primary | ICD-10-CM

## 2023-10-12 PROCEDURE — 99213 OFFICE O/P EST LOW 20 MIN: CPT

## 2023-10-12 PROCEDURE — 3074F SYST BP LT 130 MM HG: CPT

## 2023-10-12 PROCEDURE — 3078F DIAST BP <80 MM HG: CPT

## 2023-10-12 PROCEDURE — 1124F ACP DISCUSS-NO DSCNMKR DOCD: CPT

## 2023-10-12 ASSESSMENT — ENCOUNTER SYMPTOMS
WHEEZING: 0
SHORTNESS OF BREATH: 0
RHINORRHEA: 0
SINUS PRESSURE: 0
COUGH: 0
SINUS PAIN: 0

## 2023-10-18 ENCOUNTER — PATIENT MESSAGE (OUTPATIENT)
Dept: FAMILY MEDICINE CLINIC | Age: 68
End: 2023-10-18

## 2023-10-18 DIAGNOSIS — E11.65 TYPE 2 DIABETES MELLITUS WITH HYPERGLYCEMIA, WITHOUT LONG-TERM CURRENT USE OF INSULIN (HCC): Primary | ICD-10-CM

## 2023-10-19 NOTE — TELEPHONE ENCOUNTER
From: Bobbi Cooper  To: Carmen Mccarty  Sent: 10/18/2023 10:51 PM EDT  Subject: Ozempic 2    Dear Trevor Brooke:    Apryl Esposito find [address] [place] [address] anywhere. The other one that you ordered for Aj Carpio is all gone too. [facility] [address] did [facility]empic [facility] (only one left) today at 4:30 p.m. What do you want Aj Carpio to do? On Sunday, he has nothing to take. Thank you for your assistance!      Sincerely,  Nicole Cooper for Susanna Morales

## 2023-10-30 ENCOUNTER — OFFICE VISIT (OUTPATIENT)
Dept: FAMILY MEDICINE CLINIC | Age: 68
End: 2023-10-30
Payer: COMMERCIAL

## 2023-10-30 VITALS
SYSTOLIC BLOOD PRESSURE: 140 MMHG | TEMPERATURE: 97.8 F | DIASTOLIC BLOOD PRESSURE: 80 MMHG | WEIGHT: 166 LBS | OXYGEN SATURATION: 97 % | RESPIRATION RATE: 16 BRPM | HEART RATE: 71 BPM | BODY MASS INDEX: 22.51 KG/M2

## 2023-10-30 DIAGNOSIS — F32.A DEPRESSION, UNSPECIFIED DEPRESSION TYPE: ICD-10-CM

## 2023-10-30 DIAGNOSIS — G25.81 RLS (RESTLESS LEGS SYNDROME): ICD-10-CM

## 2023-10-30 DIAGNOSIS — I10 ESSENTIAL HYPERTENSION: ICD-10-CM

## 2023-10-30 DIAGNOSIS — I48.91 ATRIAL FIBRILLATION WITH RVR (HCC): ICD-10-CM

## 2023-10-30 DIAGNOSIS — G47.00 INSOMNIA, UNSPECIFIED TYPE: ICD-10-CM

## 2023-10-30 DIAGNOSIS — R53.83 OTHER FATIGUE: ICD-10-CM

## 2023-10-30 DIAGNOSIS — E11.65 TYPE 2 DIABETES MELLITUS WITH HYPERGLYCEMIA, WITHOUT LONG-TERM CURRENT USE OF INSULIN (HCC): ICD-10-CM

## 2023-10-30 DIAGNOSIS — D68.69 SECONDARY HYPERCOAGULABLE STATE (HCC): ICD-10-CM

## 2023-10-30 DIAGNOSIS — Z12.5 SCREENING FOR PROSTATE CANCER: ICD-10-CM

## 2023-10-30 DIAGNOSIS — J40 BRONCHITIS: Primary | ICD-10-CM

## 2023-10-30 PROCEDURE — 99213 OFFICE O/P EST LOW 20 MIN: CPT | Performed by: NURSE PRACTITIONER

## 2023-10-30 PROCEDURE — 3079F DIAST BP 80-89 MM HG: CPT | Performed by: NURSE PRACTITIONER

## 2023-10-30 PROCEDURE — 3077F SYST BP >= 140 MM HG: CPT | Performed by: NURSE PRACTITIONER

## 2023-10-30 PROCEDURE — 3052F HG A1C>EQUAL 8.0%<EQUAL 9.0%: CPT | Performed by: NURSE PRACTITIONER

## 2023-10-30 PROCEDURE — 1124F ACP DISCUSS-NO DSCNMKR DOCD: CPT | Performed by: NURSE PRACTITIONER

## 2023-10-30 RX ORDER — CEFUROXIME AXETIL 250 MG/1
250 TABLET ORAL 2 TIMES DAILY
Qty: 20 TABLET | Refills: 0 | Status: SHIPPED | OUTPATIENT
Start: 2023-10-30 | End: 2023-11-09

## 2023-10-30 RX ORDER — SEMAGLUTIDE 2.68 MG/ML
2 INJECTION, SOLUTION SUBCUTANEOUS WEEKLY
Qty: 9 ML | Refills: 2 | Status: SHIPPED | OUTPATIENT
Start: 2023-10-30

## 2023-10-30 ASSESSMENT — ENCOUNTER SYMPTOMS
ABDOMINAL PAIN: 0
WHEEZING: 0
SHORTNESS OF BREATH: 0
COUGH: 1
BACK PAIN: 0
CHEST TIGHTNESS: 0
ABDOMINAL DISTENTION: 0

## 2023-10-30 NOTE — PROGRESS NOTES
[address] MEDICINE  [address]  [facility] [address]  Dept: [phone]  Dept Fax: [phone]  Loc: [phone]  PROGRESS NOTE      VisitDate: 10/30/2023    Vincent Arrington is a 79 y.o. male who presents today for:     Chief Complaint   Patient presents with    Cough     And runny nose x7 days. Subjective:  Patient complains of cough congestion past week. Denies fever headache shortness of breath chest pain. Taking NyQuil in the evenings with good relief. Patient also requesting refill of his semaglutide. Taking medications as prescribed. History BPH depression type 2 diabetes factor V stroke sleep apnea arthritis hypertension ,Right Visual Field cut. Patient requesting to have his routine labs as well as testosterone level checked due to increased fatigue and loss of muscle mass. Mood stable. Review of Systems   Constitutional:  Positive for fatigue. Negative for activity change, appetite change, chills and fever. HENT:  Positive for congestion. Eyes:  Negative for visual disturbance. Respiratory:  Positive for cough. Negative for chest tightness, shortness of breath and wheezing. Cardiovascular:  Negative for chest pain, palpitations and leg swelling. Gastrointestinal:  Negative for abdominal distention and abdominal pain. Genitourinary:  Negative for dysuria. Musculoskeletal:  Negative for arthralgias, back pain and neck pain. Skin: Negative. Negative for rash. Neurological:  Negative for dizziness, light-headedness and headaches. Hematological:  Negative for adenopathy. Psychiatric/Behavioral:  Negative for decreased concentration and dysphoric mood. All other systems reviewed and are negative.     Past Medical History:   Diagnosis Date    BPH (benign prostatic hyperplasia)     Depression     Diabetes mellitus ([address])     Factor 5 Leiden mutation, heterozygous (HCC)     Heterozygous MTHFR mutation

## 2023-11-21 RX ORDER — SEMAGLUTIDE 2.68 MG/ML
INJECTION, SOLUTION SUBCUTANEOUS
Qty: 15 ML | Refills: 1 | Status: SHIPPED | OUTPATIENT
Start: 2023-11-21

## 2023-11-22 ENCOUNTER — OFFICE VISIT (OUTPATIENT)
Dept: FAMILY MEDICINE CLINIC | Age: 68
End: 2023-11-22
Payer: COMMERCIAL

## 2023-11-22 VITALS
HEART RATE: 87 BPM | TEMPERATURE: 97.7 F | BODY MASS INDEX: 22.78 KG/M2 | SYSTOLIC BLOOD PRESSURE: 136 MMHG | DIASTOLIC BLOOD PRESSURE: 64 MMHG | RESPIRATION RATE: 16 BRPM | OXYGEN SATURATION: 96 % | WEIGHT: 168 LBS

## 2023-11-22 DIAGNOSIS — E11.65 TYPE 2 DIABETES MELLITUS WITH HYPERGLYCEMIA, WITHOUT LONG-TERM CURRENT USE OF INSULIN (HCC): Primary | ICD-10-CM

## 2023-11-22 DIAGNOSIS — I48.91 ATRIAL FIBRILLATION WITH RVR (HCC): ICD-10-CM

## 2023-11-22 DIAGNOSIS — G47.00 INSOMNIA, UNSPECIFIED TYPE: ICD-10-CM

## 2023-11-22 DIAGNOSIS — I10 ESSENTIAL HYPERTENSION: ICD-10-CM

## 2023-11-22 DIAGNOSIS — R05.3 PERSISTENT COUGH: ICD-10-CM

## 2023-11-22 DIAGNOSIS — D68.69 SECONDARY HYPERCOAGULABLE STATE (HCC): ICD-10-CM

## 2023-11-22 DIAGNOSIS — G25.81 RLS (RESTLESS LEGS SYNDROME): ICD-10-CM

## 2023-11-22 DIAGNOSIS — R09.89 CHRONIC THROAT CLEARING: ICD-10-CM

## 2023-11-22 DIAGNOSIS — F32.A DEPRESSION, UNSPECIFIED DEPRESSION TYPE: ICD-10-CM

## 2023-11-22 DIAGNOSIS — Z87.891 HISTORY OF CIGARETTE SMOKING: ICD-10-CM

## 2023-11-22 PROCEDURE — 3078F DIAST BP <80 MM HG: CPT | Performed by: NURSE PRACTITIONER

## 2023-11-22 PROCEDURE — 1124F ACP DISCUSS-NO DSCNMKR DOCD: CPT | Performed by: NURSE PRACTITIONER

## 2023-11-22 PROCEDURE — 3051F HG A1C>EQUAL 7.0%<8.0%: CPT | Performed by: NURSE PRACTITIONER

## 2023-11-22 PROCEDURE — 99213 OFFICE O/P EST LOW 20 MIN: CPT | Performed by: NURSE PRACTITIONER

## 2023-11-22 PROCEDURE — 3074F SYST BP LT 130 MM HG: CPT | Performed by: NURSE PRACTITIONER

## 2023-11-24 LAB
TESTOSTERONE FREE: 68.2 PG/ML (ref 35–155)
TESTOSTERONE TOTAL: 598 NG/DL (ref 250–1100)

## 2023-11-29 ASSESSMENT — ENCOUNTER SYMPTOMS
COUGH: 0
ABDOMINAL DISTENTION: 0
BACK PAIN: 0
ABDOMINAL PAIN: 0
CHEST TIGHTNESS: 0
WHEEZING: 0
SHORTNESS OF BREATH: 0

## 2023-11-29 NOTE — PROGRESS NOTES
instructions. Discussed use, benefit, and side effects of prescribed medications. All patient questions answered. Pt voiced understanding. Reviewed health maintenance. Patient agreedwith treatment plan. Follow up as directed.           Electronically signed by ARTURO Shearer CNP on 11/29/2023 at 8:51 AM

## 2023-12-11 ENCOUNTER — CARE COORDINATION (OUTPATIENT)
Dept: CARE COORDINATION | Age: 68
End: 2023-12-11

## 2023-12-11 ENCOUNTER — TELEPHONE (OUTPATIENT)
Dept: FAMILY MEDICINE CLINIC | Age: 68
End: 2023-12-11

## 2023-12-11 ENCOUNTER — PATIENT MESSAGE (OUTPATIENT)
Dept: FAMILY MEDICINE CLINIC | Age: 68
End: 2023-12-11

## 2023-12-11 RX ORDER — EXENATIDE 2 MG/.65ML
2 INJECTION, SUSPENSION, EXTENDED RELEASE SUBCUTANEOUS WEEKLY
Qty: 13 PEN | Refills: 3 | Status: SHIPPED | OUTPATIENT
Start: 2023-12-11 | End: 2023-12-11 | Stop reason: SDUPTHER

## 2023-12-11 RX ORDER — EXENATIDE 2 MG/.65ML
2 INJECTION, SUSPENSION, EXTENDED RELEASE SUBCUTANEOUS WEEKLY
Qty: 4 PEN | Refills: 3 | Status: SHIPPED | OUTPATIENT
Start: 2023-12-11 | End: 2023-12-11 | Stop reason: SDUPTHER

## 2023-12-11 NOTE — TELEPHONE ENCOUNTER
Tyrone Galvin is asking if you can contact Clarinda Hamman regarding the following message and help with patient assistance:    \"  From: Yessenia Tyler  To: Yudi Rahmanes  Sent: 12/11/2023 12:50 AM EST  Subject: Medicine Questions    Tyrone Galvin:    I am officially on Medicare now. When getting my script card, I was told that the following would cost me a great deal of money:    Ozempic  Trintellix  Jardiance  Eliquis    Is there a way to get a discount program for these drugs. Or possibly change meds. The also told us to check with your office to see if  the doctor would give us samples. Please let me know right away as I do need a new script for Ozempic. Thank you!   Helen Lawrence  [phone]\"

## 2023-12-11 NOTE — TELEPHONE ENCOUNTER
Script needs to be sent to [facility] not his local pharmacy for the [address] [facility] please fax a 90 day supply with 3 refills to [phone], he does need a jardiance script sent to his local pharmacy though.

## 2023-12-11 NOTE — TELEPHONE ENCOUNTER
From: Stephen Cooper  To: Carmen Mccarty  Sent: 12/11/2023 12:50 AM EST  Subject: Medicine Questions    Eusebio:    I am officially on Medicare now. When getting my script card, I was told that the following would cost me a great deal of money:    Ozempic  Trintellix   Jin Mora     Is there a way to get a discount program for these drugs. Or possibly change meds. The also told us to check with your office to see if the doctor would give us samples. Please let me know right away as I do need a new script for Ozempic. Thank you!   Aide Jenkins  [phone]

## 2023-12-11 NOTE — TELEPHONE ENCOUNTER
Okay to switch over to by eh if okay with patient 2 mg injection weekly. They are not equivalent to Ozempic but, should be significantly less. Bydureon sent to [employer].

## 2023-12-11 NOTE — CARE COORDINATION
I left a message asking Elenita Lu to give me a call to discuss medication assistance.         [address] [facility] Specialist  Medication Assistance  [address] [place] [address] [place] [facility]    K) [phone] (q) [phone]

## 2023-12-11 NOTE — CARE COORDINATION
Spoke with Renaldojean-claude Bledsoe and the patient and we are going to work on the application for the patients Trintellix. I was able to get him a mario alberto for the Jardiance through the [employer] for 10,Meniga that's good until 12/31/24. I reached out to [employer] asking about switching Ozempic to Bydurion and asked about samples.

## 2023-12-12 RX ORDER — EXENATIDE 2 MG/.65ML
2 INJECTION, SUSPENSION, EXTENDED RELEASE SUBCUTANEOUS WEEKLY
Qty: 13 PEN | Refills: 3 | Status: SHIPPED | OUTPATIENT
Start: 2023-12-12

## 2023-12-14 ENCOUNTER — CARE COORDINATION (OUTPATIENT)
Dept: CARE COORDINATION | Age: 68
End: 2023-12-14

## 2023-12-14 NOTE — CARE COORDINATION
Mailed the patient his portion of the application to get for his Trintellix.       Chriss Nascimento [facility]  [address] [facility]   Medication Assistance  [employer]    (S)[phone]  (Y)[phone]

## 2024-01-05 ENCOUNTER — HOSPITAL ENCOUNTER (INPATIENT)
Age: 69
LOS: 2 days | Discharge: HOME | DRG: 392 | End: 2024-01-07
Attending: PHYSICIAN ASSISTANT | Admitting: PHYSICIAN ASSISTANT
Payer: MEDICARE

## 2024-01-05 ENCOUNTER — APPOINTMENT (OUTPATIENT)
Dept: CT IMAGING | Age: 69
DRG: 392 | End: 2024-01-05
Payer: MEDICARE

## 2024-01-05 DIAGNOSIS — K62.5 HEMORRHAGE OF RECTUM AND ANUS: Primary | ICD-10-CM

## 2024-01-05 DIAGNOSIS — K52.9 COLITIS: ICD-10-CM

## 2024-01-05 LAB
ALBUMIN SERPL BCG-MCNC: 5 G/DL (ref 3.5–5.1)
ALP SERPL-CCNC: 109 U/L (ref 38–126)
ALT SERPL W/O P-5'-P-CCNC: 28 U/L (ref 11–66)
ANION GAP SERPL CALC-SCNC: 16 MEQ/L (ref 8–16)
AST SERPL-CCNC: 27 U/L (ref 5–40)
BASOPHILS ABSOLUTE: 0.1 THOU/MM3 (ref 0–0.1)
BASOPHILS NFR BLD AUTO: 0.5 %
BILIRUB SERPL-MCNC: 0.9 MG/DL (ref 0.3–1.2)
BUN SERPL-MCNC: 14 MG/DL (ref 7–22)
CALCIUM SERPL-MCNC: 10.2 MG/DL (ref 8.5–10.5)
CHLORIDE SERPL-SCNC: 102 MEQ/L (ref 98–111)
CO2 SERPL-SCNC: 25 MEQ/L (ref 23–33)
CREAT SERPL-MCNC: 0.7 MG/DL (ref 0.4–1.2)
DEPRECATED RDW RBC AUTO: 47.3 FL (ref 35–45)
EOSINOPHIL NFR BLD AUTO: 0.1 %
EOSINOPHILS ABSOLUTE: 0 THOU/MM3 (ref 0–0.4)
ERYTHROCYTE [DISTWIDTH] IN BLOOD BY AUTOMATED COUNT: 13.3 % (ref 11.5–14.5)
FLUAV RNA RESP QL NAA+PROBE: NOT DETECTED
FLUBV RNA RESP QL NAA+PROBE: NOT DETECTED
GFR SERPL CREATININE-BSD FRML MDRD: > 60 ML/MIN/1.73M2
GLUCOSE BLD STRIP.AUTO-MCNC: 124 MG/DL (ref 70–108)
GLUCOSE BLD STRIP.AUTO-MCNC: 127 MG/DL (ref 70–108)
GLUCOSE SERPL-MCNC: 188 MG/DL (ref 70–108)
HCT VFR BLD AUTO: 52.1 % (ref 42–52)
HCT VFR BLD AUTO: 59.4 % (ref 42–52)
HEMOCCULT STL QL: POSITIVE
HGB BLD-MCNC: 17.8 GM/DL (ref 14–18)
HGB BLD-MCNC: 20.1 GM/DL (ref 14–18)
IMM GRANULOCYTES # BLD AUTO: 0.06 THOU/MM3 (ref 0–0.07)
IMM GRANULOCYTES NFR BLD AUTO: 0.5 %
LACTIC ACID, SEPSIS: 1.3 MMOL/L (ref 0.5–1.9)
LACTIC ACID, SEPSIS: 1.5 MMOL/L (ref 0.5–1.9)
LIPASE SERPL-CCNC: 17.9 U/L (ref 5.6–51.3)
LYMPHOCYTES ABSOLUTE: 1.1 THOU/MM3 (ref 1–4.8)
LYMPHOCYTES NFR BLD AUTO: 8.3 %
MCH RBC QN AUTO: 32.5 PG (ref 26–33)
MCHC RBC AUTO-ENTMCNC: 33.8 GM/DL (ref 32.2–35.5)
MCV RBC AUTO: 96.1 FL (ref 80–94)
MONOCYTES ABSOLUTE: 0.5 THOU/MM3 (ref 0.4–1.3)
MONOCYTES NFR BLD AUTO: 3.5 %
NEUTROPHILS NFR BLD AUTO: 87.1 %
NRBC BLD AUTO-RTO: 0 /100 WBC
OSMOLALITY SERPL CALC.SUM OF ELEC: 290.4 MOSMOL/KG (ref 275–300)
PLATELET # BLD AUTO: 235 THOU/MM3 (ref 130–400)
PMV BLD AUTO: 9.5 FL (ref 9.4–12.4)
POTASSIUM SERPL-SCNC: 4.7 MEQ/L (ref 3.5–5.2)
PROT SERPL-MCNC: 7.4 G/DL (ref 6.1–8)
RBC # BLD AUTO: 6.18 MILL/MM3 (ref 4.7–6.1)
SARS-COV-2 RNA RESP QL NAA+PROBE: NOT DETECTED
SEGMENTED NEUTROPHILS ABSOLUTE COUNT: 11.3 THOU/MM3 (ref 1.8–7.7)
SODIUM SERPL-SCNC: 143 MEQ/L (ref 135–145)
WBC # BLD AUTO: 13 THOU/MM3 (ref 4.8–10.8)

## 2024-01-05 PROCEDURE — 6360000004 HC RX CONTRAST MEDICATION: Performed by: NURSE PRACTITIONER

## 2024-01-05 PROCEDURE — 80053 COMPREHEN METABOLIC PANEL: CPT

## 2024-01-05 PROCEDURE — 6370000000 HC RX 637 (ALT 250 FOR IP): Performed by: PHYSICIAN ASSISTANT

## 2024-01-05 PROCEDURE — 6360000002 HC RX W HCPCS: Performed by: PHYSICIAN ASSISTANT

## 2024-01-05 PROCEDURE — 74177 CT ABD & PELVIS W/CONTRAST: CPT

## 2024-01-05 PROCEDURE — 87040 BLOOD CULTURE FOR BACTERIA: CPT

## 2024-01-05 PROCEDURE — 96375 TX/PRO/DX INJ NEW DRUG ADDON: CPT

## 2024-01-05 PROCEDURE — 36415 COLL VENOUS BLD VENIPUNCTURE: CPT

## 2024-01-05 PROCEDURE — 1200000003 HC TELEMETRY R&B

## 2024-01-05 PROCEDURE — 85014 HEMATOCRIT: CPT

## 2024-01-05 PROCEDURE — 6360000002 HC RX W HCPCS: Performed by: NURSE PRACTITIONER

## 2024-01-05 PROCEDURE — 82272 OCCULT BLD FECES 1-3 TESTS: CPT

## 2024-01-05 PROCEDURE — 99223 1ST HOSP IP/OBS HIGH 75: CPT | Performed by: PHYSICIAN ASSISTANT

## 2024-01-05 PROCEDURE — 83605 ASSAY OF LACTIC ACID: CPT

## 2024-01-05 PROCEDURE — 83690 ASSAY OF LIPASE: CPT

## 2024-01-05 PROCEDURE — 85018 HEMOGLOBIN: CPT

## 2024-01-05 PROCEDURE — 85025 COMPLETE CBC W/AUTO DIFF WBC: CPT

## 2024-01-05 PROCEDURE — 93005 ELECTROCARDIOGRAM TRACING: CPT | Performed by: PHYSICIAN ASSISTANT

## 2024-01-05 PROCEDURE — 2580000003 HC RX 258: Performed by: PHYSICIAN ASSISTANT

## 2024-01-05 PROCEDURE — 6370000000 HC RX 637 (ALT 250 FOR IP): Performed by: NURSE PRACTITIONER

## 2024-01-05 PROCEDURE — C9113 INJ PANTOPRAZOLE SODIUM, VIA: HCPCS | Performed by: PHYSICIAN ASSISTANT

## 2024-01-05 PROCEDURE — 96374 THER/PROPH/DIAG INJ IV PUSH: CPT

## 2024-01-05 PROCEDURE — 99285 EMERGENCY DEPT VISIT HI MDM: CPT

## 2024-01-05 PROCEDURE — 87636 SARSCOV2 & INF A&B AMP PRB: CPT

## 2024-01-05 PROCEDURE — 82948 REAGENT STRIP/BLOOD GLUCOSE: CPT

## 2024-01-05 PROCEDURE — 2580000003 HC RX 258: Performed by: NURSE PRACTITIONER

## 2024-01-05 RX ORDER — SODIUM CHLORIDE 0.9 % (FLUSH) 0.9 %
5-40 SYRINGE (ML) INJECTION EVERY 12 HOURS SCHEDULED
Status: DISCONTINUED | OUTPATIENT
Start: 2024-01-05 | End: 2024-01-07 | Stop reason: HOSPADM

## 2024-01-05 RX ORDER — PANTOPRAZOLE SODIUM 40 MG/10ML
40 INJECTION, POWDER, LYOPHILIZED, FOR SOLUTION INTRAVENOUS DAILY
Status: DISCONTINUED | OUTPATIENT
Start: 2024-01-05 | End: 2024-01-06

## 2024-01-05 RX ORDER — METRONIDAZOLE 500 MG/100ML
500 INJECTION, SOLUTION INTRAVENOUS EVERY 8 HOURS
Status: DISCONTINUED | OUTPATIENT
Start: 2024-01-05 | End: 2024-01-07 | Stop reason: HOSPADM

## 2024-01-05 RX ORDER — HYDROCORTISONE ACETATE 25 MG/1
25 SUPPOSITORY RECTAL 2 TIMES DAILY
Status: DISCONTINUED | OUTPATIENT
Start: 2024-01-05 | End: 2024-01-07 | Stop reason: HOSPADM

## 2024-01-05 RX ORDER — LATANOPROST 50 UG/ML
1 SOLUTION/ DROPS OPHTHALMIC NIGHTLY
Status: DISCONTINUED | OUTPATIENT
Start: 2024-01-05 | End: 2024-01-07 | Stop reason: HOSPADM

## 2024-01-05 RX ORDER — DEXTROSE MONOHYDRATE 100 MG/ML
INJECTION, SOLUTION INTRAVENOUS CONTINUOUS PRN
Status: DISCONTINUED | OUTPATIENT
Start: 2024-01-05 | End: 2024-01-07 | Stop reason: HOSPADM

## 2024-01-05 RX ORDER — IBUPROFEN 600 MG/1
1 TABLET ORAL PRN
Status: DISCONTINUED | OUTPATIENT
Start: 2024-01-05 | End: 2024-01-07 | Stop reason: HOSPADM

## 2024-01-05 RX ORDER — ONDANSETRON 2 MG/ML
4 INJECTION INTRAMUSCULAR; INTRAVENOUS ONCE
Status: COMPLETED | OUTPATIENT
Start: 2024-01-05 | End: 2024-01-05

## 2024-01-05 RX ORDER — LISINOPRIL 40 MG/1
40 TABLET ORAL DAILY
Status: DISCONTINUED | OUTPATIENT
Start: 2024-01-05 | End: 2024-01-07 | Stop reason: HOSPADM

## 2024-01-05 RX ORDER — 0.9 % SODIUM CHLORIDE 0.9 %
1000 INTRAVENOUS SOLUTION INTRAVENOUS ONCE
Status: COMPLETED | OUTPATIENT
Start: 2024-01-05 | End: 2024-01-05

## 2024-01-05 RX ORDER — PRAMIPEXOLE DIHYDROCHLORIDE 0.25 MG/1
0.12 TABLET ORAL NIGHTLY
Status: DISCONTINUED | OUTPATIENT
Start: 2024-01-05 | End: 2024-01-07 | Stop reason: HOSPADM

## 2024-01-05 RX ORDER — ONDANSETRON 4 MG/1
4 TABLET, ORALLY DISINTEGRATING ORAL EVERY 8 HOURS PRN
Status: DISCONTINUED | OUTPATIENT
Start: 2024-01-05 | End: 2024-01-07 | Stop reason: HOSPADM

## 2024-01-05 RX ORDER — MORPHINE SULFATE 2 MG/ML
2 INJECTION, SOLUTION INTRAMUSCULAR; INTRAVENOUS
Status: DISCONTINUED | OUTPATIENT
Start: 2024-01-05 | End: 2024-01-07 | Stop reason: HOSPADM

## 2024-01-05 RX ORDER — CIPROFLOXACIN 2 MG/ML
400 INJECTION, SOLUTION INTRAVENOUS EVERY 12 HOURS
Status: DISCONTINUED | OUTPATIENT
Start: 2024-01-05 | End: 2024-01-07 | Stop reason: HOSPADM

## 2024-01-05 RX ORDER — SODIUM CHLORIDE 9 MG/ML
INJECTION, SOLUTION INTRAVENOUS PRN
Status: DISCONTINUED | OUTPATIENT
Start: 2024-01-05 | End: 2024-01-07 | Stop reason: HOSPADM

## 2024-01-05 RX ORDER — ACETAMINOPHEN 650 MG/1
650 SUPPOSITORY RECTAL EVERY 6 HOURS PRN
Status: DISCONTINUED | OUTPATIENT
Start: 2024-01-05 | End: 2024-01-07 | Stop reason: HOSPADM

## 2024-01-05 RX ORDER — ACETAMINOPHEN 325 MG/1
650 TABLET ORAL EVERY 6 HOURS PRN
Status: DISCONTINUED | OUTPATIENT
Start: 2024-01-05 | End: 2024-01-07 | Stop reason: HOSPADM

## 2024-01-05 RX ORDER — INSULIN LISPRO 100 [IU]/ML
0-4 INJECTION, SOLUTION INTRAVENOUS; SUBCUTANEOUS NIGHTLY
Status: DISCONTINUED | OUTPATIENT
Start: 2024-01-05 | End: 2024-01-07 | Stop reason: HOSPADM

## 2024-01-05 RX ORDER — SODIUM CHLORIDE, SODIUM LACTATE, POTASSIUM CHLORIDE, CALCIUM CHLORIDE 600; 310; 30; 20 MG/100ML; MG/100ML; MG/100ML; MG/100ML
INJECTION, SOLUTION INTRAVENOUS CONTINUOUS
Status: DISCONTINUED | OUTPATIENT
Start: 2024-01-05 | End: 2024-01-07 | Stop reason: HOSPADM

## 2024-01-05 RX ORDER — POTASSIUM CHLORIDE 20 MEQ/1
40 TABLET, EXTENDED RELEASE ORAL PRN
Status: DISCONTINUED | OUTPATIENT
Start: 2024-01-05 | End: 2024-01-07 | Stop reason: HOSPADM

## 2024-01-05 RX ORDER — POTASSIUM CHLORIDE 7.45 MG/ML
10 INJECTION INTRAVENOUS PRN
Status: DISCONTINUED | OUTPATIENT
Start: 2024-01-05 | End: 2024-01-07 | Stop reason: HOSPADM

## 2024-01-05 RX ORDER — MORPHINE SULFATE 4 MG/ML
4 INJECTION, SOLUTION INTRAMUSCULAR; INTRAVENOUS
Status: DISCONTINUED | OUTPATIENT
Start: 2024-01-05 | End: 2024-01-07 | Stop reason: HOSPADM

## 2024-01-05 RX ORDER — ONDANSETRON 2 MG/ML
4 INJECTION INTRAMUSCULAR; INTRAVENOUS EVERY 6 HOURS PRN
Status: DISCONTINUED | OUTPATIENT
Start: 2024-01-05 | End: 2024-01-07 | Stop reason: HOSPADM

## 2024-01-05 RX ORDER — PROCHLORPERAZINE EDISYLATE 5 MG/ML
10 INJECTION INTRAMUSCULAR; INTRAVENOUS ONCE
Status: COMPLETED | OUTPATIENT
Start: 2024-01-05 | End: 2024-01-05

## 2024-01-05 RX ORDER — MORPHINE SULFATE 2 MG/ML
2 INJECTION, SOLUTION INTRAMUSCULAR; INTRAVENOUS ONCE
Status: COMPLETED | OUTPATIENT
Start: 2024-01-05 | End: 2024-01-05

## 2024-01-05 RX ORDER — CETIRIZINE HYDROCHLORIDE 5 MG/1
5 TABLET ORAL NIGHTLY
Status: DISCONTINUED | OUTPATIENT
Start: 2024-01-05 | End: 2024-01-07 | Stop reason: HOSPADM

## 2024-01-05 RX ORDER — INSULIN LISPRO 100 [IU]/ML
0-4 INJECTION, SOLUTION INTRAVENOUS; SUBCUTANEOUS
Status: DISCONTINUED | OUTPATIENT
Start: 2024-01-05 | End: 2024-01-07 | Stop reason: HOSPADM

## 2024-01-05 RX ORDER — MAGNESIUM SULFATE IN WATER 40 MG/ML
2000 INJECTION, SOLUTION INTRAVENOUS PRN
Status: DISCONTINUED | OUTPATIENT
Start: 2024-01-05 | End: 2024-01-07 | Stop reason: HOSPADM

## 2024-01-05 RX ORDER — POLYETHYLENE GLYCOL 3350 17 G/17G
17 POWDER, FOR SOLUTION ORAL DAILY PRN
Status: DISCONTINUED | OUTPATIENT
Start: 2024-01-05 | End: 2024-01-07 | Stop reason: HOSPADM

## 2024-01-05 RX ORDER — TRAZODONE HYDROCHLORIDE 50 MG/1
50 TABLET ORAL NIGHTLY
Status: DISCONTINUED | OUTPATIENT
Start: 2024-01-05 | End: 2024-01-07 | Stop reason: HOSPADM

## 2024-01-05 RX ORDER — SODIUM CHLORIDE 0.9 % (FLUSH) 0.9 %
5-40 SYRINGE (ML) INJECTION PRN
Status: DISCONTINUED | OUTPATIENT
Start: 2024-01-05 | End: 2024-01-07 | Stop reason: HOSPADM

## 2024-01-05 RX ADMIN — PROCHLORPERAZINE EDISYLATE 10 MG: 5 INJECTION INTRAMUSCULAR; INTRAVENOUS at 12:43

## 2024-01-05 RX ADMIN — ONDANSETRON 4 MG: 2 INJECTION INTRAMUSCULAR; INTRAVENOUS at 11:38

## 2024-01-05 RX ADMIN — SODIUM CHLORIDE, PRESERVATIVE FREE 10 ML: 5 INJECTION INTRAVENOUS at 21:01

## 2024-01-05 RX ADMIN — MORPHINE SULFATE 2 MG: 2 INJECTION, SOLUTION INTRAMUSCULAR; INTRAVENOUS at 13:12

## 2024-01-05 RX ADMIN — HYDROCORTISONE ACETATE 25 MG: 25 SUPPOSITORY RECTAL at 21:00

## 2024-01-05 RX ADMIN — ONDANSETRON 4 MG: 4 TABLET, ORALLY DISINTEGRATING ORAL at 19:03

## 2024-01-05 RX ADMIN — SODIUM CHLORIDE, POTASSIUM CHLORIDE, SODIUM LACTATE AND CALCIUM CHLORIDE: 600; 310; 30; 20 INJECTION, SOLUTION INTRAVENOUS at 16:08

## 2024-01-05 RX ADMIN — METRONIDAZOLE 500 MG: 500 INJECTION, SOLUTION INTRAVENOUS at 23:34

## 2024-01-05 RX ADMIN — PANTOPRAZOLE SODIUM 40 MG: 40 INJECTION, POWDER, FOR SOLUTION INTRAVENOUS at 16:08

## 2024-01-05 RX ADMIN — PIPERACILLIN AND TAZOBACTAM 4500 MG: 4; .5 INJECTION, POWDER, FOR SOLUTION INTRAVENOUS at 13:33

## 2024-01-05 RX ADMIN — SODIUM CHLORIDE 1000 ML: 9 INJECTION, SOLUTION INTRAVENOUS at 10:54

## 2024-01-05 RX ADMIN — METRONIDAZOLE 500 MG: 500 INJECTION, SOLUTION INTRAVENOUS at 17:02

## 2024-01-05 RX ADMIN — TRAZODONE HYDROCHLORIDE 50 MG: 50 TABLET ORAL at 20:58

## 2024-01-05 RX ADMIN — CIPROFLOXACIN 400 MG: 400 INJECTION, SOLUTION INTRAVENOUS at 21:16

## 2024-01-05 RX ADMIN — METOPROLOL TARTRATE 75 MG: 25 TABLET, FILM COATED ORAL at 20:59

## 2024-01-05 RX ADMIN — MORPHINE SULFATE 4 MG: 4 INJECTION, SOLUTION INTRAMUSCULAR; INTRAVENOUS at 20:58

## 2024-01-05 RX ADMIN — LATANOPROST 1 DROP: 50 SOLUTION OPHTHALMIC at 20:58

## 2024-01-05 RX ADMIN — PRAMIPEXOLE DIHYDROCHLORIDE 0.12 MG: 0.25 TABLET ORAL at 21:00

## 2024-01-05 RX ADMIN — IOPAMIDOL 80 ML: 755 INJECTION, SOLUTION INTRAVENOUS at 12:12

## 2024-01-05 RX ADMIN — VORTIOXETINE 10 MG: 10 TABLET, FILM COATED ORAL at 16:56

## 2024-01-05 RX ADMIN — LISINOPRIL 40 MG: 40 TABLET ORAL at 16:56

## 2024-01-05 RX ADMIN — CETIRIZINE HYDROCHLORIDE 5 MG: 5 TABLET ORAL at 21:11

## 2024-01-05 RX ADMIN — ALUMINUM HYDROXIDE, MAGNESIUM HYDROXIDE, AND SIMETHICONE: 1200; 120; 1200 SUSPENSION ORAL at 13:12

## 2024-01-05 RX ADMIN — SODIUM CHLORIDE, POTASSIUM CHLORIDE, SODIUM LACTATE AND CALCIUM CHLORIDE: 600; 310; 30; 20 INJECTION, SOLUTION INTRAVENOUS at 23:32

## 2024-01-05 ASSESSMENT — PAIN DESCRIPTION - ORIENTATION
ORIENTATION: LOWER
ORIENTATION: MID
ORIENTATION: LOWER

## 2024-01-05 ASSESSMENT — PAIN - FUNCTIONAL ASSESSMENT
PAIN_FUNCTIONAL_ASSESSMENT: ACTIVITIES ARE NOT PREVENTED
PAIN_FUNCTIONAL_ASSESSMENT: ACTIVITIES ARE NOT PREVENTED
PAIN_FUNCTIONAL_ASSESSMENT: 0-10
PAIN_FUNCTIONAL_ASSESSMENT: ACTIVITIES ARE NOT PREVENTED
PAIN_FUNCTIONAL_ASSESSMENT: 0-10

## 2024-01-05 ASSESSMENT — PAIN DESCRIPTION - FREQUENCY
FREQUENCY: CONTINUOUS
FREQUENCY: CONTINUOUS

## 2024-01-05 ASSESSMENT — PAIN SCALES - GENERAL
PAINLEVEL_OUTOF10: 8
PAINLEVEL_OUTOF10: 3
PAINLEVEL_OUTOF10: 8
PAINLEVEL_OUTOF10: 8
PAINLEVEL_OUTOF10: 2
PAINLEVEL_OUTOF10: 7
PAINLEVEL_OUTOF10: 6
PAINLEVEL_OUTOF10: 7

## 2024-01-05 ASSESSMENT — PAIN DESCRIPTION - LOCATION
LOCATION: ABDOMEN

## 2024-01-05 ASSESSMENT — PAIN DESCRIPTION - DESCRIPTORS
DESCRIPTORS: ACHING

## 2024-01-05 ASSESSMENT — PAIN DESCRIPTION - PAIN TYPE
TYPE: ACUTE PAIN

## 2024-01-05 ASSESSMENT — PAIN DESCRIPTION - ONSET
ONSET: ON-GOING
ONSET: ON-GOING

## 2024-01-05 ASSESSMENT — LIFESTYLE VARIABLES
HOW MANY STANDARD DRINKS CONTAINING ALCOHOL DO YOU HAVE ON A TYPICAL DAY: 3 OR 4
HOW OFTEN DO YOU HAVE A DRINK CONTAINING ALCOHOL: 2-4 TIMES A MONTH

## 2024-01-05 NOTE — ED NOTES
Pt presents ambulatory to ED via triage for c/o headache, emesis, diarrhea, and rectal bleeding. States headache started last evening, then other symptoms started throughout the night and has progressed. Upon initial assessment, pt is A&Ox4, resps easy and unlabored. EKG completed upon arrival. IV established with blood drawn and sent to lab. Pt swabbed for covid/flu and sent to lab. Monitor applied, VS as noted. Awaiting provider assessment and orders. Will monitor.

## 2024-01-05 NOTE — ED NOTES
ED to inpatient nurses report      Chief Complaint:  Chief Complaint   Patient presents with    Headache    Diarrhea    Emesis    Rectal Bleeding     Present to ED from: Home    MOA:     LOC: alert and orientated to name, place, date  Mobility: Independent  Oxygen Baseline: Room Air    Current needs required: Room Air     Code Status:   Prior    What abnormal results were found and what did you give/do to treat them? Colitis  Any procedures or intervention occur? Zosyn, Morphine, Zofran, Compazine    Mental Status:  Level of Consciousness: Alert (0)    Psych Assessment:        Vitals:  Patient Vitals for the past 24 hrs:   BP Temp Temp src Pulse Resp SpO2 Weight   01/05/24 1258 (!) 153/81 -- -- 98 20 96 % --   01/05/24 1137 (!) 158/95 -- -- 99 25 95 % --   01/05/24 1003 (!) 171/99 97.4 °F (36.3 °C) Oral 92 18 98 % 74.8 kg (165 lb)        LDAs:   Peripheral IV 01/05/24 Left Forearm (Active)   Site Assessment Clean, dry & intact 01/05/24 1009   Line Status Blood return noted;Flushed;Normal saline locked;Specimen collected 01/05/24 1009   Phlebitis Assessment No symptoms 01/05/24 1009   Infiltration Assessment 0 01/05/24 1009   Dressing Status Clean, dry & intact 01/05/24 1009   Dressing Type Transparent 01/05/24 1009   Dressing Intervention New 01/05/24 1009       Ambulatory Status:  No data recorded    Diagnosis:  DISPOSITION Admitted 01/05/2024 01:13:38 PM   Final diagnoses:   Hemorrhage of rectum and anus   Colitis        Consults:  IP CONSULT TO CASE MANAGEMENT     Pain Score:  Pain Assessment  Pain Assessment: 0-10  Pain Level: 7  Pain Location: Abdomen  Pain Type: Acute pain    C-SSRS:        Sepsis Screening:  Sepsis Risk Score: 5.1    Meacham Fall Risk:       Swallow Screening        Preferred Language:   English      ALLERGIES     Patient has no known allergies.    SURGICAL HISTORY       Past Surgical History:   Procedure Laterality Date    ABDOMEN SURGERY      APPENDECTOMY      CERVICAL FUSION  08

## 2024-01-05 NOTE — ED PROVIDER NOTES
[facility]    CHIEF COMPLAINT       Chief Complaint   Patient presents with    Headache    Diarrhea    Emesis    Rectal Bleeding       Nurses Notes reviewed and I agree except as noted in the HPI.    HISTORY OF PRESENT ILLNESS   Bruce Cooper is a 68 y.o. male who presents to the ED for evaluation of headache diarrhea emesis and rectal bleeding.  Patient reports symptoms began late last night, notes he had some loose stools, that turned into bloody diarrhea.  He notes he has had multiple bright red blood movements from his rectum.  He denies any rectal pain.  He notes nausea and vomiting x 3.  He describes a crampy sharp abdominal pain that is intermittent.  He currently rates the pain a 6 out of 10.  He notes colonoscopy in the past, he has diverticulosis.  He notes appendectomy in the past.  He denies any new foods, notes that he has been around family members who have had influenza, and COVID-19.  He is anticoagulated on Eliquis for A-fib.        HPI was provided by the patient.         PAST MEDICAL HISTORY     Past Medical History:   Diagnosis Date    BPH (benign prostatic hyperplasia)     Depression     Diabetes mellitus (HCC)     Factor 5 Leiden mutation, heterozygous ([facility])     Heterozygous MTHFR mutation [id number]     Hyperglycemia     Hypertension     Hypertension     Osteoarthritis     Sleep apnea     Stroke (HCC) 8/8/2013    Type II or unspecified type diabetes mellitus without mention of complication, not stated as uncontrolled 8/2012    Visual field cut 8/8/2013    Right visual field cut       SURGICALHISTORY      has a past surgical history that includes Appendectomy; Colonoscopy (6/8/12); cervical fusion (08); Umbilical hernia repair (09/17/2012  Dr Fabrizio Gudino); Abdomen surgery; and Tonsillectomy.    CURRENT MEDICATIONS       Current Discharge Medication List        CONTINUE these medications which have NOT CHANGED    Details   pramipexole (MIRAPEX) 0.125 MG tablet take 1

## 2024-01-05 NOTE — CARE COORDINATION
Spoke with patient and spouse who advised has ACP documents at home. Spouse will bring in this admission to have attached to chart. Denied concerns or needs.

## 2024-01-05 NOTE — ED NOTES
In for hourly rounding. Pt resting on cot in position of comfort. Pt remains A&Ox4, resps easy and unlabored. IV flushed and shows no s/s of infection or infiltration. Pt pain remains unchanged at this time. Medicated pt per MAR.  Monitor remains in place. Updated pt on POC. Will monitor.

## 2024-01-05 NOTE — CONSULTS
Consult History & Physical      Patient:  Bruce Cooper  YOB: 1955  MRN: [id number]     Acct: [id number]    Chief Complaint:  Colitis    Date of Service: Pt seen/examined in consultation on 1/5/2024    History Of Present Illness:      68 y.o. male who we are asked to see/evaluate by Camden Pino PA-C for medical management of colitis. Patient presented to the ED today for abdominal pain, nausea, vomiting, diarrhea, and rectal bleeding. The patient states his abdominal pain and bloody diarrhea started last night. The blood is described as bright red. Patient has a history of afib and is on Eliquis, denies NSAID use. Patient describes the pain has cramping and states it is located throughout the mid abdomen. The pain is continuous, but severity changes. He states he only had nausea and vomiting last night, denies hematemesis. Endorses chills at home, afebrile since admission. He does endorse some rectal itching. Patient states he has had intermittent diarrhea ongoing for approximately 4 weeks. He has passed some soft, formed stools throughout this time. He typically has a BM every couple days. Patient says his stools are typically firm and he often has to strain. Denies prior history of GIB. CT A/P demonstrated long segment of marked colonic wall thickening involving the sigmoid colon and descending colon to the level of the splenic flexure, worrisome for ischemic colitis. Patient states he has also been having a log of reflux symptoms.    Past Medical History:    Past Medical History:   Diagnosis Date    BPH (benign prostatic hyperplasia)     Depression     Diabetes mellitus (HCC)     Factor 5 Leiden mutation, heterozygous (HCC)     Heterozygous MTHFR mutation [id number]     Hyperglycemia     Hypertension     Hypertension     Osteoarthritis     Sleep apnea     Stroke (HCC) 8/8/2013    Type II or unspecified type diabetes mellitus without mention of complication, not stated as uncontrolled

## 2024-01-05 NOTE — H&P
Hospitalist History & Physical    Patient:  Bruce Cooper    Unit/Bed:[facility]  YOB: 1955  MRN: [id number]   Acct: [id number]   PCP: Eusebio Agudelo APRN - CNP  Code Status: Full Code    Date of Service: Pt seen/examined on 01/05/24 and admitted to Inpatient with expected LOS less than two midnights due to medical therapy.     Chief Complaint: diarrhea, rectal bleeding    Assessment/Plan:    Colitis  Noted on CT abdomen pelvis.  Etiology uncertain.  Send gastrointestinal molecular panel and C. difficile antigen/toxin  Per GI recommendations, transition to Cipro and Flagyl.  Gastroenterology consulted.  Appreciate recommendations.  N.p.o. until evaluated by gastroenterology.  Trend lactic daily.  IV fluids.  Antiemetics.  Pain control.  Paroxysmal atrial fibrillation  Continue metoprolol.  Hold Eliquis given rectal bleeding.  Hypertension  Continue ACE inhibitor hold parameters.  Non-insulin-dependent type 2 diabetes mellitus  Hold home meds.  Low-dose sliding scale.  Unspecified depression  Continue Trintellix  Obstructive sleep apnea  Continue home BiPAP  GERD  Continue PPI  History of stroke    History of Present Illness:  Bruce Cooper is a 68 y.o. male with a history of stroke, paroxysmal atrial fibrillation on Eliquis, hypertension, non-insulin-dependent type 2 diabetes mellitus, depression, obstructive sleep apnea, and GERD who presented to [facility] with chief complaint of diarrhea and rectal bleeding.  Over the last 6 weeks or so, the patient has been having intermittent episodes of significant diarrhea and abdominal cramping.  These episodes seem to be random and are not incited by anything that the patient has been able to identify.  The patient will have approximately 10-15 loose stools and about a 1 to 2-day period.  These are associated with chills as well as significant abdominal cramping.  Last night, the patient had associated nausea and vomiting which has continued.  He also

## 2024-01-06 LAB
ANION GAP SERPL CALC-SCNC: 11 MEQ/L (ref 8–16)
BASOPHILS ABSOLUTE: 0.1 THOU/MM3 (ref 0–0.1)
BASOPHILS NFR BLD AUTO: 0.5 %
BUN SERPL-MCNC: 11 MG/DL (ref 7–22)
C CAYETANENSIS DNA SPEC QL NAA+PROBE: NOT DETECTED
C DIFF GDH STL QL: NEGATIVE
CALCIUM SERPL-MCNC: 8.8 MG/DL (ref 8.5–10.5)
CAMPY SP DNA.DIARRHEA STL QL NAA+PROBE: NOT DETECTED
CHLORIDE SERPL-SCNC: 103 MEQ/L (ref 98–111)
CO2 SERPL-SCNC: 26 MEQ/L (ref 23–33)
CREAT SERPL-MCNC: 0.8 MG/DL (ref 0.4–1.2)
CRYPTOSP DNA SPEC QL NAA+PROBE: NOT DETECTED
DEPRECATED RDW RBC AUTO: 48.3 FL (ref 35–45)
E COLI O157H7 DNA SPEC QL NAA+PROBE: NORMAL
E HISTOLYT DNA SPEC QL NAA+PROBE: NOT DETECTED
EAEC PAA PLAS AGGR+AATA ST NAA+NON-PRB: NOT DETECTED
EC STX1+STX2 + H7 FLIC SPEC NAA+PROBE: NOT DETECTED
EKG ATRIAL RATE: 97 BPM
EKG P AXIS: 81 DEGREES
EKG P-R INTERVAL: 142 MS
EKG Q-T INTERVAL: 360 MS
EKG QRS DURATION: 82 MS
EKG QTC CALCULATION (BAZETT): 457 MS
EKG R AXIS: 83 DEGREES
EKG T AXIS: 51 DEGREES
EKG VENTRICULAR RATE: 97 BPM
EOSINOPHIL NFR BLD AUTO: 0.8 %
EOSINOPHILS ABSOLUTE: 0.1 THOU/MM3 (ref 0–0.4)
EPEC EAE GENE STL QL NAA+NON-PROBE: NOT DETECTED
ERYTHROCYTE [DISTWIDTH] IN BLOOD BY AUTOMATED COUNT: 13.4 % (ref 11.5–14.5)
ETEC LTA+ST1A+ST1B TOX ST NAA+NON-PROBE: NOT DETECTED
G LAMBLIA DNA SPEC QL NAA+PROBE: NOT DETECTED
GFR SERPL CREATININE-BSD FRML MDRD: > 60 ML/MIN/1.73M2
GLUCOSE BLD STRIP.AUTO-MCNC: 111 MG/DL (ref 70–108)
GLUCOSE BLD STRIP.AUTO-MCNC: 135 MG/DL (ref 70–108)
GLUCOSE BLD STRIP.AUTO-MCNC: 174 MG/DL (ref 70–108)
GLUCOSE BLD STRIP.AUTO-MCNC: 186 MG/DL (ref 70–108)
GLUCOSE SERPL-MCNC: 145 MG/DL (ref 70–108)
HADV DNA SPEC QL NAA+PROBE: NOT DETECTED
HASTV RNA SPEC QL NAA+PROBE: NOT DETECTED
HCT VFR BLD AUTO: 51 % (ref 42–52)
HGB BLD-MCNC: 17.2 GM/DL (ref 14–18)
IMM GRANULOCYTES # BLD AUTO: 0.06 THOU/MM3 (ref 0–0.07)
IMM GRANULOCYTES NFR BLD AUTO: 0.5 %
LACTIC ACID, SEPSIS: 1.7 MMOL/L (ref 0.5–1.9)
LYMPHOCYTES ABSOLUTE: 2.2 THOU/MM3 (ref 1–4.8)
LYMPHOCYTES NFR BLD AUTO: 16.5 %
MCH RBC QN AUTO: 32.8 PG (ref 26–33)
MCHC RBC AUTO-ENTMCNC: 33.7 GM/DL (ref 32.2–35.5)
MCV RBC AUTO: 97.1 FL (ref 80–94)
MONOCYTES ABSOLUTE: 1.4 THOU/MM3 (ref 0.4–1.3)
MONOCYTES NFR BLD AUTO: 10.6 %
NEUTROPHILS NFR BLD AUTO: 71.1 %
NOROVIRUS GI + GII RNA STL NAA+PROBE: NOT DETECTED
NRBC BLD AUTO-RTO: 0 /100 WBC
P SHIGELLOIDES DNA STL QL NAA+PROBE: NOT DETECTED
PLATELET # BLD AUTO: 170 THOU/MM3 (ref 130–400)
PMV BLD AUTO: 9.3 FL (ref 9.4–12.4)
POTASSIUM SERPL-SCNC: 4.5 MEQ/L (ref 3.5–5.2)
RBC # BLD AUTO: 5.25 MILL/MM3 (ref 4.7–6.1)
RV RNA SPEC QL NAA+PROBE: NOT DETECTED
SALMONELLA DNA SPEC QL NAA+PROBE: NOT DETECTED
SAPOVIRUS RNA SPEC QL NAA+PROBE: NOT DETECTED
SEGMENTED NEUTROPHILS ABSOLUTE COUNT: 9.5 THOU/MM3 (ref 1.8–7.7)
SHIGELLA SP+EIEC IPAH ST NAA+NON-PROBE: NOT DETECTED
SODIUM SERPL-SCNC: 140 MEQ/L (ref 135–145)
V CHOLERAE DNA SPEC QL NAA+PROBE: NOT DETECTED
VIBRIO DNA SPEC NAA+PROBE: NOT DETECTED
WBC # BLD AUTO: 13.3 THOU/MM3 (ref 4.8–10.8)
Y ENTERO RECN STL QL NAA+PROBE: NOT DETECTED

## 2024-01-06 PROCEDURE — 80048 BASIC METABOLIC PNL TOTAL CA: CPT

## 2024-01-06 PROCEDURE — 85025 COMPLETE CBC W/AUTO DIFF WBC: CPT

## 2024-01-06 PROCEDURE — 83605 ASSAY OF LACTIC ACID: CPT

## 2024-01-06 PROCEDURE — 6370000000 HC RX 637 (ALT 250 FOR IP): Performed by: NURSE PRACTITIONER

## 2024-01-06 PROCEDURE — 6360000002 HC RX W HCPCS: Performed by: PHYSICIAN ASSISTANT

## 2024-01-06 PROCEDURE — 36415 COLL VENOUS BLD VENIPUNCTURE: CPT

## 2024-01-06 PROCEDURE — 2580000003 HC RX 258: Performed by: PHYSICIAN ASSISTANT

## 2024-01-06 PROCEDURE — 1200000003 HC TELEMETRY R&B

## 2024-01-06 PROCEDURE — C9113 INJ PANTOPRAZOLE SODIUM, VIA: HCPCS | Performed by: PHYSICIAN ASSISTANT

## 2024-01-06 PROCEDURE — 6370000000 HC RX 637 (ALT 250 FOR IP): Performed by: PHYSICIAN ASSISTANT

## 2024-01-06 PROCEDURE — 82948 REAGENT STRIP/BLOOD GLUCOSE: CPT

## 2024-01-06 PROCEDURE — 6370000000 HC RX 637 (ALT 250 FOR IP)

## 2024-01-06 PROCEDURE — 87507 IADNA-DNA/RNA PROBE TQ 12-25: CPT

## 2024-01-06 PROCEDURE — 93010 ELECTROCARDIOGRAM REPORT: CPT | Performed by: INTERNAL MEDICINE

## 2024-01-06 PROCEDURE — 87449 NOS EACH ORGANISM AG IA: CPT

## 2024-01-06 PROCEDURE — 99232 SBSQ HOSP IP/OBS MODERATE 35: CPT | Performed by: PHYSICIAN ASSISTANT

## 2024-01-06 RX ORDER — LACTOBACILLUS RHAMNOSUS GG 10B CELL
1 CAPSULE ORAL
Status: DISCONTINUED | OUTPATIENT
Start: 2024-01-06 | End: 2024-01-07 | Stop reason: HOSPADM

## 2024-01-06 RX ORDER — PANTOPRAZOLE SODIUM 40 MG/1
40 TABLET, DELAYED RELEASE ORAL
Status: DISCONTINUED | OUTPATIENT
Start: 2024-01-07 | End: 2024-01-07 | Stop reason: HOSPADM

## 2024-01-06 RX ADMIN — METRONIDAZOLE 500 MG: 500 INJECTION, SOLUTION INTRAVENOUS at 15:38

## 2024-01-06 RX ADMIN — LISINOPRIL 40 MG: 40 TABLET ORAL at 08:18

## 2024-01-06 RX ADMIN — SODIUM CHLORIDE, PRESERVATIVE FREE 10 ML: 5 INJECTION INTRAVENOUS at 21:20

## 2024-01-06 RX ADMIN — METOPROLOL TARTRATE 75 MG: 25 TABLET, FILM COATED ORAL at 08:18

## 2024-01-06 RX ADMIN — PANTOPRAZOLE SODIUM 40 MG: 40 INJECTION, POWDER, FOR SOLUTION INTRAVENOUS at 08:18

## 2024-01-06 RX ADMIN — MORPHINE SULFATE 4 MG: 4 INJECTION, SOLUTION INTRAMUSCULAR; INTRAVENOUS at 13:56

## 2024-01-06 RX ADMIN — LATANOPROST 1 DROP: 50 SOLUTION OPHTHALMIC at 21:19

## 2024-01-06 RX ADMIN — VORTIOXETINE 10 MG: 10 TABLET, FILM COATED ORAL at 08:18

## 2024-01-06 RX ADMIN — MORPHINE SULFATE 2 MG: 2 INJECTION, SOLUTION INTRAMUSCULAR; INTRAVENOUS at 21:19

## 2024-01-06 RX ADMIN — METOPROLOL TARTRATE 75 MG: 25 TABLET, FILM COATED ORAL at 21:19

## 2024-01-06 RX ADMIN — HYDROCORTISONE ACETATE 25 MG: 25 SUPPOSITORY RECTAL at 08:19

## 2024-01-06 RX ADMIN — METRONIDAZOLE 500 MG: 500 INJECTION, SOLUTION INTRAVENOUS at 23:43

## 2024-01-06 RX ADMIN — MORPHINE SULFATE 4 MG: 4 INJECTION, SOLUTION INTRAMUSCULAR; INTRAVENOUS at 09:22

## 2024-01-06 RX ADMIN — CETIRIZINE HYDROCHLORIDE 5 MG: 5 TABLET ORAL at 21:19

## 2024-01-06 RX ADMIN — Medication 1 CAPSULE: at 10:26

## 2024-01-06 RX ADMIN — PRAMIPEXOLE DIHYDROCHLORIDE 0.12 MG: 0.25 TABLET ORAL at 21:19

## 2024-01-06 RX ADMIN — METRONIDAZOLE 500 MG: 500 INJECTION, SOLUTION INTRAVENOUS at 09:14

## 2024-01-06 RX ADMIN — TRAZODONE HYDROCHLORIDE 50 MG: 50 TABLET ORAL at 21:19

## 2024-01-06 RX ADMIN — CIPROFLOXACIN 400 MG: 400 INJECTION, SOLUTION INTRAVENOUS at 09:12

## 2024-01-06 RX ADMIN — HYDROCORTISONE ACETATE 25 MG: 25 SUPPOSITORY RECTAL at 21:19

## 2024-01-06 RX ADMIN — CIPROFLOXACIN 400 MG: 400 INJECTION, SOLUTION INTRAVENOUS at 21:30

## 2024-01-06 RX ADMIN — SODIUM CHLORIDE, PRESERVATIVE FREE 10 ML: 5 INJECTION INTRAVENOUS at 08:20

## 2024-01-06 ASSESSMENT — PAIN DESCRIPTION - DESCRIPTORS: DESCRIPTORS: ACHING;DISCOMFORT

## 2024-01-06 ASSESSMENT — PAIN SCALES - GENERAL: PAINLEVEL_OUTOF10: 4

## 2024-01-06 ASSESSMENT — PAIN DESCRIPTION - ORIENTATION: ORIENTATION: MID;LEFT;RIGHT

## 2024-01-06 ASSESSMENT — PAIN - FUNCTIONAL ASSESSMENT: PAIN_FUNCTIONAL_ASSESSMENT: ACTIVITIES ARE NOT PREVENTED

## 2024-01-06 ASSESSMENT — PAIN DESCRIPTION - LOCATION: LOCATION: ABDOMEN

## 2024-01-06 NOTE — PLAN OF CARE
Problem: Chronic Conditions and Co-morbidities  Goal: Patient's chronic conditions and co-morbidity symptoms are monitored and maintained or improved  Outcome: Progressing     Problem: Pain  Goal: Verbalizes/displays adequate comfort level or baseline comfort level  Outcome: Progressing     Problem: ABCDS Injury Assessment  Goal: Absence of physical injury  Outcome: Progressing     Problem: Gastrointestinal - Adult  Goal: Minimal or absence of nausea and vomiting  Outcome: Progressing  Goal: Maintains or returns to baseline bowel function  Outcome: Progressing     Problem: Safety - Adult  Goal: Free from fall injury  Outcome: Progressing

## 2024-01-06 NOTE — PLAN OF CARE
Problem: Chronic Conditions and Co-morbidities  Goal: Patient's chronic conditions and co-morbidity symptoms are monitored and maintained or improved  1/5/2024 2151 by Edis Waterman RN  Outcome: Progressing  Flowsheets (Taken 1/5/2024 1635 by Steve Vieyra RN)  Care Plan - Patient's Chronic Conditions and Co-Morbidity Symptoms are Monitored and Maintained or Improved:   Monitor and assess patient's chronic conditions and comorbid symptoms for stability, deterioration, or improvement   Collaborate with multidisciplinary team to address chronic and comorbid conditions and prevent exacerbation or deterioration   Update acute care plan with appropriate goals if chronic or comorbid symptoms are exacerbated and prevent overall improvement and discharge  1/5/2024 2151 by Edis Waterman RN  Outcome: Progressing  Flowsheets (Taken 1/5/2024 1635 by Steve Vieyra, RN)  Care Plan - Patient's Chronic Conditions and Co-Morbidity Symptoms are Monitored and Maintained or Improved:   Monitor and assess patient's chronic conditions and comorbid symptoms for stability, deterioration, or improvement   Collaborate with multidisciplinary team to address chronic and comorbid conditions and prevent exacerbation or deterioration   Update acute care plan with appropriate goals if chronic or comorbid symptoms are exacerbated and prevent overall improvement and discharge     Problem: Pain  Goal: Verbalizes/displays adequate comfort level or baseline comfort level  1/5/2024 2151 by Edis Waterman RN  Outcome: Progressing  1/5/2024 2151 by Edis Waterman RN  Outcome: Progressing  Flowsheets (Taken 1/5/2024 2151)  Verbalizes/displays adequate comfort level or baseline comfort level:   Encourage patient to monitor pain and request assistance   Assess pain using appropriate pain scale     Problem: ABCDS Injury Assessment  Goal: Absence of physical injury  1/5/2024 2151 by Edis Waterman RN  Outcome: Progressing  1/5/2024 2151 by

## 2024-01-07 VITALS
HEART RATE: 75 BPM | OXYGEN SATURATION: 96 % | TEMPERATURE: 98.4 F | SYSTOLIC BLOOD PRESSURE: 114 MMHG | RESPIRATION RATE: 18 BRPM | DIASTOLIC BLOOD PRESSURE: 68 MMHG | HEIGHT: 72 IN | BODY MASS INDEX: 22.35 KG/M2 | WEIGHT: 165 LBS

## 2024-01-07 LAB
GLUCOSE BLD STRIP.AUTO-MCNC: 109 MG/DL (ref 70–108)
GLUCOSE BLD STRIP.AUTO-MCNC: 162 MG/DL (ref 70–108)
GLUCOSE BLD STRIP.AUTO-MCNC: 87 MG/DL (ref 70–108)
HCT VFR BLD AUTO: 44.6 % (ref 42–52)
HGB BLD-MCNC: 15.3 GM/DL (ref 14–18)
LACTIC ACID, SEPSIS: 0.9 MMOL/L (ref 0.5–1.9)

## 2024-01-07 PROCEDURE — 83605 ASSAY OF LACTIC ACID: CPT

## 2024-01-07 PROCEDURE — 82948 REAGENT STRIP/BLOOD GLUCOSE: CPT

## 2024-01-07 PROCEDURE — 6370000000 HC RX 637 (ALT 250 FOR IP): Performed by: PHYSICIAN ASSISTANT

## 2024-01-07 PROCEDURE — 36415 COLL VENOUS BLD VENIPUNCTURE: CPT

## 2024-01-07 PROCEDURE — 6370000000 HC RX 637 (ALT 250 FOR IP)

## 2024-01-07 PROCEDURE — 85014 HEMATOCRIT: CPT

## 2024-01-07 PROCEDURE — 6360000002 HC RX W HCPCS: Performed by: PHYSICIAN ASSISTANT

## 2024-01-07 PROCEDURE — 85018 HEMOGLOBIN: CPT

## 2024-01-07 PROCEDURE — 6370000000 HC RX 637 (ALT 250 FOR IP): Performed by: NURSE PRACTITIONER

## 2024-01-07 PROCEDURE — 2580000003 HC RX 258: Performed by: PHYSICIAN ASSISTANT

## 2024-01-07 PROCEDURE — 99238 HOSP IP/OBS DSCHRG MGMT 30/<: CPT | Performed by: PHYSICIAN ASSISTANT

## 2024-01-07 RX ORDER — CIPROFLOXACIN 500 MG/1
500 TABLET, FILM COATED ORAL 2 TIMES DAILY
Qty: 14 TABLET | Refills: 0 | Status: SHIPPED | OUTPATIENT
Start: 2024-01-07 | End: 2024-01-14

## 2024-01-07 RX ORDER — HYDROCODONE BITARTRATE AND ACETAMINOPHEN 5; 325 MG/1; MG/1
1 TABLET ORAL EVERY 4 HOURS PRN
Qty: 30 TABLET | Refills: 0 | Status: SHIPPED | OUTPATIENT
Start: 2024-01-07 | End: 2024-01-12

## 2024-01-07 RX ORDER — METRONIDAZOLE 500 MG/1
500 TABLET ORAL 2 TIMES DAILY
Qty: 14 TABLET | Refills: 0 | Status: SHIPPED | OUTPATIENT
Start: 2024-01-07 | End: 2024-01-14

## 2024-01-07 RX ORDER — PANTOPRAZOLE SODIUM 40 MG/1
40 TABLET, DELAYED RELEASE ORAL
Qty: 30 TABLET | Refills: 3 | Status: SHIPPED | OUTPATIENT
Start: 2024-01-08

## 2024-01-07 RX ADMIN — METRONIDAZOLE 500 MG: 500 INJECTION, SOLUTION INTRAVENOUS at 09:51

## 2024-01-07 RX ADMIN — VORTIOXETINE 10 MG: 10 TABLET, FILM COATED ORAL at 09:53

## 2024-01-07 RX ADMIN — METOPROLOL TARTRATE 75 MG: 25 TABLET, FILM COATED ORAL at 09:53

## 2024-01-07 RX ADMIN — SODIUM CHLORIDE, POTASSIUM CHLORIDE, SODIUM LACTATE AND CALCIUM CHLORIDE: 600; 310; 30; 20 INJECTION, SOLUTION INTRAVENOUS at 05:11

## 2024-01-07 RX ADMIN — Medication 1 CAPSULE: at 09:53

## 2024-01-07 RX ADMIN — PANTOPRAZOLE SODIUM 40 MG: 40 TABLET, DELAYED RELEASE ORAL at 05:07

## 2024-01-07 RX ADMIN — HYDROCORTISONE ACETATE 25 MG: 25 SUPPOSITORY RECTAL at 09:53

## 2024-01-07 RX ADMIN — LISINOPRIL 40 MG: 40 TABLET ORAL at 09:53

## 2024-01-07 RX ADMIN — ACETAMINOPHEN 650 MG: 325 TABLET ORAL at 10:09

## 2024-01-07 RX ADMIN — CIPROFLOXACIN 400 MG: 400 INJECTION, SOLUTION INTRAVENOUS at 11:23

## 2024-01-07 RX ADMIN — MORPHINE SULFATE 2 MG: 2 INJECTION, SOLUTION INTRAMUSCULAR; INTRAVENOUS at 05:07

## 2024-01-07 RX ADMIN — METRONIDAZOLE 500 MG: 500 INJECTION, SOLUTION INTRAVENOUS at 16:35

## 2024-01-07 ASSESSMENT — PAIN DESCRIPTION - ORIENTATION: ORIENTATION: RIGHT;LEFT;MID

## 2024-01-07 ASSESSMENT — PAIN DESCRIPTION - LOCATION: LOCATION: ABDOMEN

## 2024-01-07 ASSESSMENT — PAIN SCALES - GENERAL
PAINLEVEL_OUTOF10: 4
PAINLEVEL_OUTOF10: 3

## 2024-01-07 ASSESSMENT — PAIN - FUNCTIONAL ASSESSMENT: PAIN_FUNCTIONAL_ASSESSMENT: ACTIVITIES ARE NOT PREVENTED

## 2024-01-07 ASSESSMENT — PAIN DESCRIPTION - DESCRIPTORS: DESCRIPTORS: ACHING;DISCOMFORT

## 2024-01-07 NOTE — PLAN OF CARE
Problem: Chronic Conditions and Co-morbidities  Goal: Patient's chronic conditions and co-morbidity symptoms are monitored and maintained or improved  1/7/2024 0002 by Adelita Hernández RN  Outcome: Progressing  Flowsheets (Taken 1/6/2024 2114)  Care Plan - Patient's Chronic Conditions and Co-Morbidity Symptoms are Monitored and Maintained or Improved:   Monitor and assess patient's chronic conditions and comorbid symptoms for stability, deterioration, or improvement   Collaborate with multidisciplinary team to address chronic and comorbid conditions and prevent exacerbation or deterioration   Update acute care plan with appropriate goals if chronic or comorbid symptoms are exacerbated and prevent overall improvement and discharge  Kept monitored for untoward signs and symptoms.     Problem: Pain  Goal: Verbalizes/displays adequate comfort level or baseline comfort level  1/7/2024 0002 by Adelita Hernández RN  Outcome: Progressing  Flowsheets (Taken 1/6/2024 2114)  Verbalizes/displays adequate comfort level or baseline comfort level:   Encourage patient to monitor pain and request assistance   Assess pain using appropriate pain scale   Administer analgesics based on type and severity of pain and evaluate response   Implement non-pharmacological measures as appropriate and evaluate response  Pain Assessment: 0-10  Pain Level: 4   Patient's Stated Pain Goal: 0 - No pain   Is pain goal met at this time?  Yes     Non-Pharmaceutical Pain Intervention(s): Rest      Problem: Gastrointestinal - Adult  Goal: Minimal or absence of nausea and vomiting  1/7/2024 0002 by Adelita Hernández RN  Outcome: Progressing  Flowsheets (Taken 1/6/2024 2114)  Minimal or absence of nausea and vomiting:   Administer IV fluids as ordered to ensure adequate hydration   Provide nonpharmacologic comfort measures as appropriate  Received with no complaints of nausea and/or bouts of vomiting.     Problem: Gastrointestinal - Adult  Goal: Maintains or

## 2024-01-07 NOTE — DISCHARGE SUMMARY
Hospital Medicine Discharge Summary      Patient Identification:   Bruce Cooper   : 1955  MRN: [id number]   Account: [id number]      Patient's PCP: Eusebio Agudelo APRN - CNP    Admit Date: 2024     Discharge Date: 2024    Admitting Physician: Camden Pino PA-C     Discharge Physician: Misha Bustamante PA-C     Bruce Cooper is a 68 y.o. male admitted to [facility] on 2024.      HPI On Admission From H&P:    \"Bruce Cooper is a 68 y.o. male with a history of stroke, paroxysmal atrial fibrillation on Eliquis, hypertension, non-insulin-dependent type 2 diabetes mellitus, depression, obstructive sleep apnea, and GERD who presented to [facility] with chief complaint of diarrhea and rectal bleeding.  Over the last 6 weeks or so, the patient has been having intermittent episodes of significant diarrhea and abdominal cramping.  These episodes seem to be random and are not incited by anything that the patient has been able to identify.  The patient will have approximately 10-15 loose stools and about a 1 to 2-day period.  These are associated with chills as well as significant abdominal cramping.  Last night, the patient had associated nausea and vomiting which has continued.  He also noted rectal bleeding which was new.  He is on Eliquis.  He reports his last colonoscopy was approximately 2 years ago and was reportedly normal.  He denies any history of similar episodes in the past.  He has not started any new medications recently.  He does have some sick contacts and has been around family members with COVID and \"the flu.\"  No recent hospitalizations or stays at [facility]'s.  No personal history of ulcerative colitis or Crohn's disease.  He does have a family history of ulcerative colitis and a niece.  He does smoke the occasional cigar.  He drinks alcohol occasionally and usually only on Friday nights.  He does use a BiPAP for sleep apnea.\"    Assessment/Plan With Discharge

## 2024-01-07 NOTE — PROGRESS NOTES
Hospitalist Progress Note    Patient:  Bruce Cooper      Unit/Bed:5K-21/021-A    YOB: 1955    MRN: [id number]       Acct: [id number]     PCP: Eusebio Agudelo APRN - CNP    Date of Admission: 1/5/2024    Assessment/Plan:    Colitis:  Noted on CT abdomen pelvis  Send gastrointestinal molecular panel and C. difficile antigen/toxin  Continue Cipro/Flagyl  Gastroenterology consulted.  Appreciate recommendations.  Full Liquid diet per gastroenterology  IV fluids  Antiemetics  Analgesics PRN    Paroxysmal atrial fibrillation:  Continue metoprolol  Hold Eliquis given rectal bleeding    Hypertension:  Continue ACE inhibitor hold parameters.    Non-insulin-dependent type 2 diabetes mellitus:  Continue basal insulin and sliding scale insulin  Accu-cheks AC and HS  Carb controlled diet  Hypoglycemia protocol    Unspecified depression:  Continue Trintellix    Obstructive sleep apnea:  Continue home BiPAP    GERD:  Continue PPI    History of CVA    Chief Complaint: diarrhea, rectal bleeding       Subjective: 68 y.o. male admitted to the hospitalist service for diarrhea and rectal bleeding. Patient reports his pain is better than yesterday, he rates his pain a 2-3/10. Patient endorses nausea. He denies vomiting. He denies chest pain.    Medications:    Infusion Medications    sodium chloride      lactated ringers IV soln 100 mL/hr at 01/05/24 2332    dextrose       Scheduled Medications    [START ON 1/7/2024] pantoprazole  40 mg Oral QAM AC    lactobacillus  1 capsule Oral Daily with breakfast    sodium chloride flush  5-40 mL IntraVENous 2 times per day    ciprofloxacin  400 mg IntraVENous Q12H    metroNIDAZOLE  500 mg IntraVENous Q8H    insulin lispro  0-4 Units SubCUTAneous TID WC    insulin lispro  0-4 Units SubCUTAneous Nightly    [Held by provider] apixaban  5 mg Oral BID    cetirizine  5 mg Oral Nightly    latanoprost  1 drop Both Eyes Nightly    metoprolol tartrate  75 mg Oral BID    pramipexole  
Gastroenterology Progress Note:     Patient Name:  Bruce Cooper   MRN: [id number]  [id number]  YOB: 1955  Admit Date: 1/5/2024  9:57 AM  Primary Care Physician: Eusebio Agudelo APRN - CNP   5K-21/021-A     Patient seen and examined.  24 hours events and chart reviewed.    Subjective:   Patient WBC are elevated at 13.3. Patient remains on Cipro and Flagyl. Patient endorses 3 loose bloody stools overnight. Patient pain has reduced to a dull cramping in his lower abdomen. Patient remains on a clear liquid diet and is tolerating.       Objective:  /72   Pulse 78   Temp 98.5 °F (36.9 °C) (Oral)   Resp 18   Ht 1.829 m (6')   Wt 74.8 kg (165 lb)   SpO2 97%   BMI 22.38 kg/m²     Physical Exam:    General:  Nourished in no distress  HEENT: Atraumatic, normocephalic. Moist oral mucous membranes.  Neck: Supple without adenopathy, JVD, thyromegaly or masses. Trachea midline.  CV: Heart RRR, no murmurs, rubs, gallops.  Resp: Even, easy without cough or accessory use. Lungs clear to ascultation bilaterally.   Abd: Round, soft, non-tender. No hepatosplenomegaly or mass present. Active bowel sounds heard. No distention noted.   Ext:  Without cyanosis, clubbing, edema.   Skin: Pink, warm, dry  Neuro:  Alert, oriented x 3 with no obvious deficits.       Rectal: deferred    Labs:   CBC:   Lab Results   Component Value Date/Time    WBC 13.3 01/06/2024 05:29 AM    HGB 17.2 01/06/2024 05:29 AM    HCT 51.0 01/06/2024 05:29 AM    MCV 97.1 01/06/2024 05:29 AM     01/06/2024 05:29 AM     BMP:   Lab Results   Component Value Date/Time     01/06/2024 05:29 AM    K 4.5 01/06/2024 05:29 AM     01/06/2024 05:29 AM    CO2 26 01/06/2024 05:29 AM    BUN 11 01/06/2024 05:29 AM    CREATININE 0.8 01/06/2024 05:29 AM    CALCIUM 8.8 01/06/2024 05:29 AM     PT/INR:   Lab Results   Component Value Date/Time    INR 0.92 05/17/2022 10:21 PM     Lipids:   Lab Results   Component Value Date/Time    ALKPHOS 109 
Patient discharged to home with wife. All questions answered.   
abdomen. The pain is continuous, but severity changes. He states he only had nausea and vomiting last night, denies hematemesis. Endorses chills at home, afebrile since admission. He does endorse some rectal itching. Patient states he has had intermittent diarrhea ongoing for approximately 4 weeks. He has passed some soft, formed stools throughout this time. He typically has a BM every couple days. Patient says his stools are typically firm and he often has to strain. Denies prior history of GIB. CT A/P demonstrated long segment of marked colonic wall thickening involving the sigmoid colon and descending colon to the level of the splenic flexure, worrisome for ischemic colitis. Patient states he has also been having a lot of reflux symptoms.      ASSESSMENT:  Colitis- noted on CT  Hematochezia- likely hemorrhoidal  Acute diarrhea  History of chronic constipation  Afib- on Eliquis PTA, last dose 01/04/24  GERD- on PPI PTA  Hx of HTN  Type II DM- on Ozempic  JEANETTE    Plan:    Monitor H & H  Nursing to monitor stool output & document  PPI continued  IVPB Cipro & Flagyl  Advance to regular diet, no red dye  Hold Eliquis- may restart 1 week after discharge  Pain & nausea control per primary  Anusol suppository BID  Patient will need a colonoscopy as OP  Supportive care per primary team  GI will sign off and follow as OP            Case reviewed and impression/plan reviewed in collaboration with Dr. Alejo    Electronically signed by ARTURO Rizzo - CNP on 1/7/2024 at 8:09 AM    [facility]

## 2024-01-08 ENCOUNTER — TELEPHONE (OUTPATIENT)
Dept: FAMILY MEDICINE CLINIC | Age: 69
End: 2024-01-08

## 2024-01-08 NOTE — TELEPHONE ENCOUNTER
Care Transitions Initial Follow Up Call    Outreach made within 2 business days of discharge: Yes    Patient: Bruce Cooper Patient : 1955   MRN: [id number]  Reason for Admission: There are no discharge diagnoses documented for the most recent discharge.  Discharge Date: 24       Spoke with: patient     Discharge department/facility: [facility]    TCM Interactive Patient Contact:  Was patient able to fill all prescriptions: Yes  Was patient instructed to bring all medications to the follow-up visit: Yes  Is patient taking all medications as directed in the discharge summary? Picked prescriptions up and will start taking soon  Does patient understand their discharge instructions: Yes  Does patient have questions or concerns that need addressed prior to 7-14 day follow up office visit: no    Scheduled appointment with PCP within 7-14 days    Follow Up  Future Appointments   Date Time Provider Department Center   1/10/2024 11:20 AM Eusebio Agudelo APRN - CNP SRPX AK[facility] FM [facility] [place]   2024  9:15 AM Bell Ernst MD N SRPX Heart [facility] [place]   10/18/2024 12:00 PM Meggan Vail APRN - CNP N Pulm Med [facility]       [place], [facility]

## 2024-01-10 ENCOUNTER — OFFICE VISIT (OUTPATIENT)
Dept: FAMILY MEDICINE CLINIC | Age: 69
End: 2024-01-10

## 2024-01-10 VITALS
HEIGHT: 72 IN | OXYGEN SATURATION: 98 % | WEIGHT: 162 LBS | DIASTOLIC BLOOD PRESSURE: 80 MMHG | SYSTOLIC BLOOD PRESSURE: 128 MMHG | BODY MASS INDEX: 21.94 KG/M2 | RESPIRATION RATE: 18 BRPM | HEART RATE: 90 BPM

## 2024-01-10 DIAGNOSIS — Z09 HOSPITAL DISCHARGE FOLLOW-UP: ICD-10-CM

## 2024-01-10 DIAGNOSIS — K52.9 COLITIS: Primary | ICD-10-CM

## 2024-01-10 DIAGNOSIS — D68.69 SECONDARY HYPERCOAGULABLE STATE (HCC): ICD-10-CM

## 2024-01-10 DIAGNOSIS — I48.91 ATRIAL FIBRILLATION WITH RVR (HCC): ICD-10-CM

## 2024-01-10 DIAGNOSIS — E11.65 TYPE 2 DIABETES MELLITUS WITH HYPERGLYCEMIA, WITHOUT LONG-TERM CURRENT USE OF INSULIN (HCC): ICD-10-CM

## 2024-01-10 LAB
BACTERIA BLD AEROBE CULT: NORMAL
BACTERIA BLD AEROBE CULT: NORMAL

## 2024-01-10 RX ORDER — ONDANSETRON 4 MG/1
4 TABLET, ORALLY DISINTEGRATING ORAL 3 TIMES DAILY PRN
Qty: 60 TABLET | Refills: 0 | Status: SHIPPED | OUTPATIENT
Start: 2024-01-10

## 2024-01-10 ASSESSMENT — PATIENT HEALTH QUESTIONNAIRE - PHQ9
SUM OF ALL RESPONSES TO PHQ QUESTIONS 1-9: 0
SUM OF ALL RESPONSES TO PHQ QUESTIONS 1-9: 0
6. FEELING BAD ABOUT YOURSELF - OR THAT YOU ARE A FAILURE OR HAVE LET YOURSELF OR YOUR FAMILY DOWN: 0
3. TROUBLE FALLING OR STAYING ASLEEP: 0
8. MOVING OR SPEAKING SO SLOWLY THAT OTHER PEOPLE COULD HAVE NOTICED. OR THE OPPOSITE, BEING SO FIGETY OR RESTLESS THAT YOU HAVE BEEN MOVING AROUND A LOT MORE THAN USUAL: 0
SUM OF ALL RESPONSES TO PHQ QUESTIONS 1-9: 0
SUM OF ALL RESPONSES TO PHQ9 QUESTIONS 1 & 2: 0
SUM OF ALL RESPONSES TO PHQ QUESTIONS 1-9: 0
9. THOUGHTS THAT YOU WOULD BE BETTER OFF DEAD, OR OF HURTING YOURSELF: 0
10. IF YOU CHECKED OFF ANY PROBLEMS, HOW DIFFICULT HAVE THESE PROBLEMS MADE IT FOR YOU TO DO YOUR WORK, TAKE CARE OF THINGS AT HOME, OR GET ALONG WITH OTHER PEOPLE: 0
1. LITTLE INTEREST OR PLEASURE IN DOING THINGS: 0
5. POOR APPETITE OR OVEREATING: 0
4. FEELING TIRED OR HAVING LITTLE ENERGY: 0
7. TROUBLE CONCENTRATING ON THINGS, SUCH AS READING THE NEWSPAPER OR WATCHING TELEVISION: 0
2. FEELING DOWN, DEPRESSED OR HOPELESS: 0

## 2024-01-10 ASSESSMENT — ENCOUNTER SYMPTOMS
VOMITING: 1
DIARRHEA: 1
CHEST TIGHTNESS: 0
COUGH: 0
WHEEZING: 0
BLOOD IN STOOL: 1
ABDOMINAL PAIN: 1
ABDOMINAL DISTENTION: 0
NAUSEA: 1
BACK PAIN: 0
SHORTNESS OF BREATH: 0

## 2024-01-10 NOTE — PROGRESS NOTES
Cardiovascular:  Negative for chest pain, palpitations and leg swelling.   Gastrointestinal:  Positive for abdominal pain, blood in stool, diarrhea, nausea and vomiting. Negative for abdominal distention.   Genitourinary:  Negative for dysuria.   Musculoskeletal:  Negative for arthralgias, back pain and neck pain.   Skin: Negative.  Negative for rash.   Neurological:  Negative for dizziness, light-headedness and headaches.   Hematological:  Negative for adenopathy.   Psychiatric/Behavioral:  Negative for decreased concentration and dysphoric mood.    All other systems reviewed and are negative.      Vitals:    01/10/24 1133   BP: 128/80   Pulse: 90   Resp: 18   SpO2: 98%   Weight: 73.5 kg (162 lb)   Height: 1.829 m (6')     Body mass index is 21.97 kg/m².   Wt Readings from Last 3 Encounters:   01/10/24 73.5 kg (162 lb)   01/05/24 74.8 kg (165 lb)   11/22/23 76.2 kg (168 lb)     BP Readings from Last 3 Encounters:   01/10/24 128/80   01/07/24 114/68   11/22/23 136/64       Physical Exam  Constitutional:       Appearance: Normal appearance. He is well-developed. He is not diaphoretic.   HENT:      Head: Normocephalic and atraumatic. Not macrocephalic and not microcephalic.      Right Ear: Hearing, tympanic membrane, ear canal and external ear normal. No drainage or tenderness. No middle ear effusion. No hemotympanum. Tympanic membrane is not injected, scarred, perforated or bulging.      Left Ear: Hearing, tympanic membrane, ear canal and external ear normal. No drainage or tenderness.  No middle ear effusion. No hemotympanum. Tympanic membrane is not injected, scarred, perforated or bulging.      Nose: No nasal deformity, septal deviation, mucosal edema or rhinorrhea.      Right Sinus: No maxillary sinus tenderness or frontal sinus tenderness.      Left Sinus: No maxillary sinus tenderness or frontal sinus tenderness.      Mouth/Throat:      Mouth: No oral lesions.      Dentition: Normal dentition. Does not have

## 2024-01-15 RX ORDER — METOPROLOL TARTRATE 75 MG/1
1 TABLET, FILM COATED ORAL 2 TIMES DAILY
Qty: 180 TABLET | Refills: 3 | Status: SHIPPED | OUTPATIENT
Start: 2024-01-15

## 2024-01-15 NOTE — TELEPHONE ENCOUNTER
----- Message from Bruce Cooper sent at 1/12/2024  7:48 PM EST -----  Regarding: New Scripts  Contact: [phone]  PLEASE MAKE THESE 90 DAY SUPPLY.    Please order a new script and send to [facility] on [address] for the following:  Eliquis tabs 5 Mg  Metoprolol Tartrate tabs 75 Mg  Thank you!  Adam Cooper

## 2024-01-22 ENCOUNTER — CARE COORDINATION (OUTPATIENT)
Dept: CARE COORDINATION | Age: 69
End: 2024-01-22

## 2024-01-22 NOTE — CARE COORDINATION
Wife is asking me to resend the [employer] application as they never got my first copy. I will do that next time I am in the office.        Tricia Villeda [facility]  CC   Medication Assistance  [place] and [place] [employer] [employer]    (P) [phone]  (F) [phone]

## 2024-01-24 ENCOUNTER — CARE COORDINATION (OUTPATIENT)
Dept: CARE COORDINATION | Age: 69
End: 2024-01-24

## 2024-01-24 NOTE — CARE COORDINATION
Resent the patient is portion of the application for assistance on his Trintilex.        Tricia Villeda [facility]  CC   Medication Assistance  [place] and [place] [employer]    (P) [phone]  (F) [phone]

## 2024-01-25 ENCOUNTER — OFFICE VISIT (OUTPATIENT)
Dept: PULMONOLOGY | Age: 69
End: 2024-01-25
Payer: MEDICARE

## 2024-01-25 VITALS
BODY MASS INDEX: 21.81 KG/M2 | WEIGHT: 161 LBS | TEMPERATURE: 97.4 F | HEART RATE: 82 BPM | HEIGHT: 72 IN | SYSTOLIC BLOOD PRESSURE: 128 MMHG | DIASTOLIC BLOOD PRESSURE: 82 MMHG | OXYGEN SATURATION: 96 %

## 2024-01-25 DIAGNOSIS — Z87.891 PERSONAL HISTORY OF TOBACCO USE: Primary | ICD-10-CM

## 2024-01-25 DIAGNOSIS — F17.200 SMOKER: ICD-10-CM

## 2024-01-25 DIAGNOSIS — R05.2 SUBACUTE COUGH: ICD-10-CM

## 2024-01-25 PROBLEM — H69.90 ETD (EUSTACHIAN TUBE DYSFUNCTION): Status: ACTIVE | Noted: 2024-01-25

## 2024-01-25 PROBLEM — F19.21 HISTORY OF SUBSTANCE DEPENDENCE (HCC): Status: ACTIVE | Noted: 2023-08-15

## 2024-01-25 PROBLEM — Z86.73 HISTORY OF STROKE: Status: ACTIVE | Noted: 2024-01-25

## 2024-01-25 PROBLEM — H90.5 SENSORINEURAL HEARING LOSS (SNHL): Status: ACTIVE | Noted: 2024-01-25

## 2024-01-25 PROBLEM — J30.9 ALLERGIC RHINITIS: Status: ACTIVE | Noted: 2024-01-25

## 2024-01-25 PROCEDURE — G0296 VISIT TO DETERM LDCT ELIG: HCPCS | Performed by: INTERNAL MEDICINE

## 2024-01-25 PROCEDURE — 3079F DIAST BP 80-89 MM HG: CPT | Performed by: INTERNAL MEDICINE

## 2024-01-25 PROCEDURE — 99406 BEHAV CHNG SMOKING 3-10 MIN: CPT | Performed by: INTERNAL MEDICINE

## 2024-01-25 PROCEDURE — 99204 OFFICE O/P NEW MOD 45 MIN: CPT | Performed by: INTERNAL MEDICINE

## 2024-01-25 PROCEDURE — 1124F ACP DISCUSS-NO DSCNMKR DOCD: CPT | Performed by: INTERNAL MEDICINE

## 2024-01-25 PROCEDURE — 3074F SYST BP LT 130 MM HG: CPT | Performed by: INTERNAL MEDICINE

## 2024-01-25 ASSESSMENT — ENCOUNTER SYMPTOMS
CONSTIPATION: 1
STRIDOR: 0
COUGH: 0
CHEST TIGHTNESS: 0
TROUBLE SWALLOWING: 0
WHEEZING: 0
SHORTNESS OF BREATH: 0
VOICE CHANGE: 0

## 2024-01-25 NOTE — PROGRESS NOTES
Discussed with the patient the current USPSTF guidelines released March 9, 2021 for screening for lung cancer.    For adults aged 50 to 80 years who have a 20 pack-year smoking history and currently smoke or have quit within the past 15 years the grade B recommendation is to:  Screen for lung cancer with low-dose computed tomography (LDCT) every year.  Stop screening once a person has not smoked for 15 years or has a health problem that limits life expectancy or the ability to have lung surgery.    The patient  reports that he has quit smoking. His smoking use included e-cigarettes and cigarettes. He started smoking about 50 years ago. He has a 25.2 pack-year smoking history. He has never used smokeless tobacco.. Discussed with patient the risks and benefits of screening, including over-diagnosis, false positive rate, and total radiation exposure.  The patient currently exhibits no signs or symptoms suggestive of lung cancer.  Discussed with patient the importance of compliance with yearly annual lung cancer screenings and willingness to undergo diagnosis and treatment if screening scan is positive.  In addition, the patient was counseled regarding the importance of remaining smoke free and/or total smoking cessation.    Also reviewed the following if the patient has Medicare that as of February 10, 2022, Medicare only covers LDCT screening in patients aged 50-77 with at least a 20 pack-year smoking history who currently smoke or have quit in the last 15 years

## 2024-01-25 NOTE — PROGRESS NOTES
[place]    Patient: JIMMY SCHMIDT, 68 y.o.   : 1955         Subjective     Chief Complaint   Patient presents with    New Patient     New patient. Referred for COPD, History of smoking   CXR 10/09/2023        HPI  Patient referred for elevated hemoglobin and hematocrit, to make sure there is not some chronic hypoxic component, chronic breathing component causing his elevated hemoglobin.  Had suspected ischemic colitis per last admission with significant abdominal pain and CT abdomen and pelvis with contrast that was potentially suggestive of ischemic colitis, however patient was treated conservatively and improved.  Has reported factor V Leiden, and some concern that his elevated hemoglobin is related to vascular congestion.  Mostly asymptomatic from a breathing perspective, no shortness of breath except with extreme activities, minimal cough but reports this is from a recent respiratory tract infection, does have some allergies, no wheezing, no stridor, no chest pain or chest tightness.    Patient has not been admitted or treated for pneumonia, pulmonary embolism, or respiratory failure.  Patient has not been intubated for reasons other than planned procedures.  He is a current smoker currently smoking cigars small ones he says 6 a day or so also uses a vape that he says is without nicotine.  Was a smoker of may be a pack a day to half a pack a day for 40 years or so, some of those years did smoke a pack and a half a day, after his last strokes stopped cigarettes but continued smoking cigars, for which he inhales.    Past Medical hx   PMH:  Past Medical History:   Diagnosis Date    BPH (benign prostatic hyperplasia)     Depression     Diabetes mellitus (HCC)     Factor 5 Leiden mutation, heterozygous (HCC)     Heterozygous MTHFR mutation [id number]     Hyperglycemia     Hypertension     Hypertension     Osteoarthritis     Sleep apnea     Stroke (HCC) 2013    Type II or

## 2024-01-30 ENCOUNTER — PATIENT MESSAGE (OUTPATIENT)
Dept: FAMILY MEDICINE CLINIC | Age: 69
End: 2024-01-30

## 2024-01-30 NOTE — TELEPHONE ENCOUNTER
From: Bruce Cooper  To: Eusebio Agudelo  Sent: 1/30/2024 12:36 PM EST  Subject: Diabetes Medicine    Dear Eusebio:    Is there a muscle/weight gain diet to help me gain weight and muscle in addition to exercise? Feeling like I am losing muscle everywhere. Do you think I should switch to a different type of medicine? Please let me know.    Sincerely,  Adam Cooper

## 2024-01-30 NOTE — TELEPHONE ENCOUNTER
Brief Operative Note    Patient: MEET Dunn 60 year old female    MRN: [id number]    Surgeon(s): Waldemar Palacios MD  Phone Number: [phone]                       Surgeon(s) and Role:     * Waldemar Palacios MD - Primary    Assistant(s):     Pre-Op Diagnosis: RIGHT EAR OTOSCLEROSIS     Post-Op Diagnosis: Same as preop     Procedure: Procedure(s):  RIGHT STAPEDECTOMY; RIGHT EAR LOBE FAT GRAFT    Anesthesia Type: General                                   Complications: None    Description:     Findings: Fixed footplate    Specimens Removed: No specimens collected     Estimated Blood Loss: No Value 0.5 cc    Assistant Tasks:      Implants:   Implant Name Type Inv. Item Serial No.  Lot No. LRB No. Used Action   SPONGE ABS [id number] 50 8X6.25CM [employer] GELTN HMST - [id number] Other Implant SPONGE ABS [id number] 50 8X6.25CM [employer]EL[employer] HMST  [facility] [employer] [id number] Right 1 Implanted   IMP HNDL BUCKET TI .[id number] - [id number] Other Implant IMP HNDL BUCKET TI .[id number]  [facility] [employer] Leah [id number] Right 1 Implanted                LOV 1-10-24

## 2024-01-31 ENCOUNTER — PATIENT MESSAGE (OUTPATIENT)
Dept: FAMILY MEDICINE CLINIC | Age: 69
End: 2024-01-31

## 2024-01-31 DIAGNOSIS — R63.4 WEIGHT LOSS: ICD-10-CM

## 2024-01-31 DIAGNOSIS — E11.65 TYPE 2 DIABETES MELLITUS WITH HYPERGLYCEMIA, WITHOUT LONG-TERM CURRENT USE OF INSULIN (HCC): Primary | ICD-10-CM

## 2024-01-31 NOTE — TELEPHONE ENCOUNTER
From: Bruce Cooper  To: Eusebio Agudelo  Sent: 1/31/2024 3:44 PM EST  Subject: Byduranulfo Medina does not look healthy. We are both looking at changing to another medicine that will not cause him to lose weight and muscle. What do you think?    Adam/Nicole

## 2024-02-01 NOTE — TELEPHONE ENCOUNTER
I do not feel the patient is on any medications that would cause significant weight loss.  If you would like to stop bydureon at this time.  I also recommended patient consult with endocrinology

## 2024-02-05 ENCOUNTER — CARE COORDINATION (OUTPATIENT)
Dept: CARE COORDINATION | Age: 69
End: 2024-02-05

## 2024-02-05 NOTE — CARE COORDINATION
Spoke with Nicole (spouse) and she is going to work on getting me new proof of income. I also was able to start the application for Eliquis for [employer].      Tricia Villeda [facility]  CC   Medication Assistance  [place] and [employer]    (P) [phone]  (F) [phone]

## 2024-02-06 ENCOUNTER — CARE COORDINATION (OUTPATIENT)
Dept: CARE COORDINATION | Age: 69
End: 2024-02-06

## 2024-02-06 NOTE — CARE COORDINATION
I was able to submit the patients application for Trintellix. Once I hear back from the company I will let the patient know.         Tricia Villeda [facility]  CC   Medication Assistance  [place] and [facility]    (P) [phone]  (F) [phone]

## 2024-02-07 ENCOUNTER — CARE COORDINATION (OUTPATIENT)
Dept: CARE COORDINATION | Age: 69
End: 2024-02-07

## 2024-02-07 NOTE — CARE COORDINATION
I was able to fax in his completed application to [employer] for Eliquis assistance.      Tricia Villeda [facility]  CC   Medication Assistance  [place] and [employer]    (P) [phone]  (F) [phone]

## 2024-02-14 ENCOUNTER — CARE COORDINATION (OUTPATIENT)
Dept: CARE COORDINATION | Age: 69
End: 2024-02-14

## 2024-02-14 NOTE — CARE COORDINATION
Spoke with Priscilla Hylton and they are processing his application now for Eliquis. I will check back in a few days.        Tricia Villeda [facility]  CC   Medication Assistance  [place] [employer] [place] [facility]    (P) [phone]  (F) [phone]

## 2024-02-19 ENCOUNTER — CARE COORDINATION (OUTPATIENT)
Dept: CARE COORDINATION | Age: 69
End: 2024-02-19

## 2024-02-19 NOTE — CARE COORDINATION
I called on both applications and both companies needed me to send in more information. I called to let Nicole know what was going on.      Tricia Villeda [facility]  CC   Medication Assistance  [place] [employer] [place] [facility]    (P) [phone]  (F) [phone]

## 2024-02-21 ENCOUNTER — CARE COORDINATION (OUTPATIENT)
Dept: CARE COORDINATION | Age: 69
End: 2024-02-21

## 2024-02-22 ENCOUNTER — HOSPITAL ENCOUNTER (OUTPATIENT)
Dept: CT IMAGING | Age: 69
Discharge: HOME | End: 2024-02-22
Attending: INTERNAL MEDICINE
Payer: MEDICARE

## 2024-02-22 ENCOUNTER — HOSPITAL ENCOUNTER (OUTPATIENT)
Dept: PULMONOLOGY | Age: 69
Discharge: HOME | End: 2024-02-22
Attending: INTERNAL MEDICINE
Payer: MEDICARE

## 2024-02-22 DIAGNOSIS — R05.2 SUBACUTE COUGH: ICD-10-CM

## 2024-02-22 DIAGNOSIS — Z87.891 PERSONAL HISTORY OF TOBACCO USE: ICD-10-CM

## 2024-02-22 DIAGNOSIS — F17.200 SMOKER: ICD-10-CM

## 2024-02-22 PROCEDURE — 94729 DIFFUSING CAPACITY: CPT

## 2024-02-22 PROCEDURE — 94060 EVALUATION OF WHEEZING: CPT

## 2024-02-22 PROCEDURE — 71271 CT THORAX LUNG CANCER SCR C-: CPT

## 2024-02-22 PROCEDURE — 94726 PLETHYSMOGRAPHY LUNG VOLUMES: CPT

## 2024-02-23 ENCOUNTER — OFFICE VISIT (OUTPATIENT)
Dept: CARDIOLOGY CLINIC | Age: 69
End: 2024-02-23
Payer: MEDICARE

## 2024-02-23 ENCOUNTER — TELEPHONE (OUTPATIENT)
Dept: CARDIOLOGY CLINIC | Age: 69
End: 2024-02-23

## 2024-02-23 ENCOUNTER — CARE COORDINATION (OUTPATIENT)
Dept: CARE COORDINATION | Age: 69
End: 2024-02-23

## 2024-02-23 VITALS
SYSTOLIC BLOOD PRESSURE: 130 MMHG | BODY MASS INDEX: 22.59 KG/M2 | HEART RATE: 68 BPM | WEIGHT: 166.8 LBS | DIASTOLIC BLOOD PRESSURE: 58 MMHG | HEIGHT: 72 IN

## 2024-02-23 DIAGNOSIS — I48.0 PAROXYSMAL ATRIAL FIBRILLATION (HCC): Primary | ICD-10-CM

## 2024-02-23 DIAGNOSIS — I10 PRIMARY HYPERTENSION: ICD-10-CM

## 2024-02-23 PROCEDURE — G8484 FLU IMMUNIZE NO ADMIN: HCPCS | Performed by: NUCLEAR MEDICINE

## 2024-02-23 PROCEDURE — 3017F COLORECTAL CA SCREEN DOC REV: CPT | Performed by: NUCLEAR MEDICINE

## 2024-02-23 PROCEDURE — 1036F TOBACCO NON-USER: CPT | Performed by: NUCLEAR MEDICINE

## 2024-02-23 PROCEDURE — 1124F ACP DISCUSS-NO DSCNMKR DOCD: CPT | Performed by: NUCLEAR MEDICINE

## 2024-02-23 PROCEDURE — 3078F DIAST BP <80 MM HG: CPT | Performed by: NUCLEAR MEDICINE

## 2024-02-23 PROCEDURE — 3075F SYST BP GE 130 - 139MM HG: CPT | Performed by: NUCLEAR MEDICINE

## 2024-02-23 PROCEDURE — G8427 DOCREV CUR MEDS BY ELIG CLIN: HCPCS | Performed by: NUCLEAR MEDICINE

## 2024-02-23 PROCEDURE — G8420 CALC BMI NORM PARAMETERS: HCPCS | Performed by: NUCLEAR MEDICINE

## 2024-02-23 PROCEDURE — 99213 OFFICE O/P EST LOW 20 MIN: CPT | Performed by: NUCLEAR MEDICINE

## 2024-02-23 NOTE — PROGRESS NOTES
tablet 3    apixaban (ELIQUIS) 5 MG TABS tablet Indications: Do not resume for 1 week TAKE 1 TABLET TWICE A  tablet 3    Metoprolol Tartrate 75 MG TABS Take 1 tablet by mouth 2 times daily 180 tablet 3    ondansetron (ZOFRAN-ODT) 4 MG disintegrating tablet Take 1 tablet by mouth 3 times daily as needed for Nausea or Vomiting 60 tablet 0    pantoprazole (PROTONIX) 40 MG tablet Take 1 tablet by mouth every morning (before breakfast) 30 tablet 3    pramipexole (MIRAPEX) 0.125 MG tablet take 1 tablet by mouth nightly 90 tablet 3    Exenatide (BYDUREON) 2 MG PEN Inject 1 pen  into the skin once a week 13 pen 3    empagliflozin (JARDIANCE) 25 MG tablet Take 1 tablet by mouth every morning 90 tablet 3    NONFORMULARY daily Powdered Greens      NONFORMULARY daily Protein Powder      traZODone (DESYREL) 50 MG tablet take 1 tablet by mouth every evening 90 tablet 3    benazepril (LOTENSIN) 40 MG tablet TAKE 1 TABLET BY MOUTH DAILY 90 tablet 2    TRUEplus Lancets 33G MISC USE TWICE A  each 3    cetirizine (ZYRTEC) 5 MG tablet Take 1 tablet by mouth at bedtime 30 tablet 0    COLLAGEN PO Take by mouth      Blood Glucose Monitoring Suppl (ACCU-CHEK GUIDE) w/Device KIT CHECK BLOOD SUGAR DAILY      Apple Sudhir Vn-Grn Tea-Bit Or-Cr (APPLE CIDER VINEGAR PLUS) TABS Take by mouth      ACCU-CHEK SMARTVIEW strip TEST BLOOD SUGAR ONCE A DAY 50 strip 11    Cholecalciferol (VITAMIN D3) 2000 units CAPS Take by mouth daily       latanoprost (XALATAN) 0.005 % ophthalmic solution Place 1 drop into both eyes daily      OLIVE LEAF PO Take by mouth       NONFORMULARY daily PROSTATE SUPPORT      multivitamin (THERAGRAN) per tablet Take 1 tablet by mouth daily       No current facility-administered medications for this visit.     No Known Allergies  Health Maintenance   Topic Date Due    DTaP/Tdap/Td vaccine (1 - Tdap) Never done    Respiratory Syncytial Virus (RSV) Pregnant or age 60 yrs+ (1 - 1-dose 60+ series) Never done    Diabetic

## 2024-02-23 NOTE — CARE COORDINATION
Patient was approved for trintellx through the PAP program until 12/31/24. Eliquis is still pending, I will check on that one Monday. I called Nicole to let her know.        Tricia Villeda [facility]  CC   Medication Assistance  [place] [place] [employer]    (P) [phone]  (F) [phone]

## 2024-02-26 ENCOUNTER — CARE COORDINATION (OUTPATIENT)
Dept: CARE COORDINATION | Age: 69
End: 2024-02-26

## 2024-02-26 NOTE — CARE COORDINATION
Patient was approved into the Eliquis program until 12/31/24. I let Nicole know and told her to get a hold of me if there is any issues.        Tricia Villeda [facility]  CC   Medication Assistance  [place] and [employer]    (P) [phone]  (F) [phone]

## 2024-02-28 ENCOUNTER — OFFICE VISIT (OUTPATIENT)
Dept: PULMONOLOGY | Age: 69
End: 2024-02-28
Payer: MEDICARE

## 2024-02-28 VITALS
BODY MASS INDEX: 22.75 KG/M2 | WEIGHT: 168 LBS | TEMPERATURE: 97.5 F | HEART RATE: 74 BPM | SYSTOLIC BLOOD PRESSURE: 118 MMHG | HEIGHT: 72 IN | OXYGEN SATURATION: 99 % | DIASTOLIC BLOOD PRESSURE: 68 MMHG

## 2024-02-28 DIAGNOSIS — Z72.0 TOBACCO ABUSE: Primary | Chronic | ICD-10-CM

## 2024-02-28 DIAGNOSIS — Z87.891 PERSONAL HISTORY OF TOBACCO USE: ICD-10-CM

## 2024-02-28 PROCEDURE — 99214 OFFICE O/P EST MOD 30 MIN: CPT | Performed by: INTERNAL MEDICINE

## 2024-02-28 PROCEDURE — 1124F ACP DISCUSS-NO DSCNMKR DOCD: CPT | Performed by: INTERNAL MEDICINE

## 2024-02-28 PROCEDURE — 3074F SYST BP LT 130 MM HG: CPT | Performed by: INTERNAL MEDICINE

## 2024-02-28 PROCEDURE — 3078F DIAST BP <80 MM HG: CPT | Performed by: INTERNAL MEDICINE

## 2024-02-28 PROCEDURE — G0296 VISIT TO DETERM LDCT ELIG: HCPCS | Performed by: INTERNAL MEDICINE

## 2024-02-28 PROCEDURE — 99407 BEHAV CHNG SMOKING > 10 MIN: CPT | Performed by: INTERNAL MEDICINE

## 2024-02-28 ASSESSMENT — ENCOUNTER SYMPTOMS
HEARTBURN: 0
COUGH: 1
CHOKING: 0
STRIDOR: 0
CONSTIPATION: 1
WHEEZING: 0
VOICE CHANGE: 0
RHINORRHEA: 1
CHEST TIGHTNESS: 0
BLOOD IN STOOL: 0
SHORTNESS OF BREATH: 0
HEMOPTYSIS: 0
TROUBLE SWALLOWING: 0

## 2024-02-28 NOTE — PROGRESS NOTES
02/28/24 76.2 kg (168 lb)   02/23/24 75.7 kg (166 lb 12.8 oz)   01/25/24 73 kg (161 lb)       Physical Exam  Vitals and nursing note reviewed.   Constitutional:       General: He is not in acute distress.     Appearance: Normal appearance.   HENT:      Head: Normocephalic and atraumatic.      Nose: Nose normal.   Eyes:      General: No scleral icterus.     Extraocular Movements: Extraocular movements intact.   Cardiovascular:      Rate and Rhythm: Normal rate and regular rhythm.      Pulses: Normal pulses.      Heart sounds: No murmur heard.  Pulmonary:      Effort: Pulmonary effort is normal. No respiratory distress.      Breath sounds: Normal breath sounds. No wheezing or rales.   Abdominal:      General: Abdomen is flat.      Palpations: Abdomen is soft.   Musculoskeletal:      Cervical back: Normal range of motion and neck supple.      Right lower leg: No edema.      Left lower leg: No edema.   Skin:     General: Skin is warm and dry.      Capillary Refill: Capillary refill takes less than 2 seconds.   Neurological:      General: No focal deficit present.      Mental Status: He is alert and oriented to person, place, and time.   Psychiatric:         Mood and Affect: Mood normal.         Behavior: Behavior normal.          results   Lung Nodule Screening     [x] Qualifies    [] Does not qualify   [] Declined    [] Completed     The USPSTFrecommends annual screening for lung cancer with low-dose computed tomography (LDCT) in adults   aged 50 to 80 years   20 pack-year smoking history and   currently smoke or have quit within the past 15 years.     Screening should be discontinued once a person has not smoked for 15 years or develops a health problem that substantially limits life expectancy or the ability or willingness to have curative lung surgery.     Assessment      Diagnosis Orders   1. Tobacco abuse        2. Personal history of tobacco use  VA VISIT TO DISCUSS LUNG CA SCREEN W LDCT    CT Lung Screen

## 2024-02-29 ENCOUNTER — TELEPHONE (OUTPATIENT)
Dept: PULMONOLOGY | Age: 69
End: 2024-02-29

## 2024-02-29 NOTE — TELEPHONE ENCOUNTER
Patient was in yesterday and requested some records (last sleep notes, and I believe echo report?) faxed to another provider. I called this morning and spoke with patient to ask him again which provider he wanted these faxed to as I have lost my note regarding this. He stated he was unsure , would have to speak with his wife and give us a call back.

## 2024-03-01 RX ORDER — BENAZEPRIL HYDROCHLORIDE 40 MG/1
40 TABLET ORAL DAILY
Qty: 90 TABLET | Refills: 3 | Status: SHIPPED | OUTPATIENT
Start: 2024-03-01

## 2024-03-20 NOTE — CARE COORDINATION
Addended by: BRITANY BLACK on: 3/20/2024 06:33 PM     Modules accepted: Orders     Spoke with [facility] and they did get the oop report and are working on it. I called [place] at [facility] and they still haven't gotten my fax and I am refaxing again today.        Tricia Villeda [facility]  CC   Medication Assistance  [place] and [employer]    (P) [phone]  (F) [phone]

## 2024-04-24 ENCOUNTER — OFFICE VISIT (OUTPATIENT)
Age: 69
End: 2024-04-24
Payer: MEDICARE

## 2024-04-24 VITALS
WEIGHT: 173 LBS | BODY MASS INDEX: 23.43 KG/M2 | HEART RATE: 75 BPM | DIASTOLIC BLOOD PRESSURE: 72 MMHG | HEIGHT: 72 IN | SYSTOLIC BLOOD PRESSURE: 128 MMHG

## 2024-04-24 DIAGNOSIS — E11.65 TYPE 2 DIABETES MELLITUS WITH HYPERGLYCEMIA, WITHOUT LONG-TERM CURRENT USE OF INSULIN (HCC): Primary | ICD-10-CM

## 2024-04-24 DIAGNOSIS — E78.2 MIXED HYPERLIPIDEMIA: ICD-10-CM

## 2024-04-24 PROCEDURE — 1124F ACP DISCUSS-NO DSCNMKR DOCD: CPT | Performed by: INTERNAL MEDICINE

## 2024-04-24 PROCEDURE — 2022F DILAT RTA XM EVC RTNOPTHY: CPT | Performed by: INTERNAL MEDICINE

## 2024-04-24 PROCEDURE — 3046F HEMOGLOBIN A1C LEVEL >9.0%: CPT | Performed by: INTERNAL MEDICINE

## 2024-04-24 PROCEDURE — 3074F SYST BP LT 130 MM HG: CPT | Performed by: INTERNAL MEDICINE

## 2024-04-24 PROCEDURE — 3078F DIAST BP <80 MM HG: CPT | Performed by: INTERNAL MEDICINE

## 2024-04-24 PROCEDURE — 3017F COLORECTAL CA SCREEN DOC REV: CPT | Performed by: INTERNAL MEDICINE

## 2024-04-24 PROCEDURE — G8420 CALC BMI NORM PARAMETERS: HCPCS | Performed by: INTERNAL MEDICINE

## 2024-04-24 PROCEDURE — 99204 OFFICE O/P NEW MOD 45 MIN: CPT | Performed by: INTERNAL MEDICINE

## 2024-04-24 PROCEDURE — 1036F TOBACCO NON-USER: CPT | Performed by: INTERNAL MEDICINE

## 2024-04-24 PROCEDURE — G8427 DOCREV CUR MEDS BY ELIG CLIN: HCPCS | Performed by: INTERNAL MEDICINE

## 2024-04-24 NOTE — PROGRESS NOTES
2013.  States he uses Vap   Substance Use Topics    Alcohol use: Yes     Alcohol/week: 0.0 standard drinks of alcohol     Comment: social      Current Outpatient Medications   Medication Sig Dispense Refill    benazepril (LOTENSIN) 40 MG tablet Take 1 tablet by mouth daily 90 tablet 3    VORTIoxetine (TRINTELLIX) 10 MG TABS tablet take 1 tablet by mouth once daily 90 tablet 3    apixaban (ELIQUIS) 5 MG TABS tablet Indications: Do not resume for 1 week TAKE 1 TABLET TWICE A  tablet 3    Metoprolol Tartrate 75 MG TABS Take 1 tablet by mouth 2 times daily 180 tablet 3    ondansetron (ZOFRAN-ODT) 4 MG disintegrating tablet Take 1 tablet by mouth 3 times daily as needed for Nausea or Vomiting 60 tablet 0    pantoprazole (PROTONIX) 40 MG tablet Take 1 tablet by mouth every morning (before breakfast) 30 tablet 3    pramipexole (MIRAPEX) 0.125 MG tablet take 1 tablet by mouth nightly 90 tablet 3    Exenatide (BYDUREON) 2 MG PEN Inject 1 pen  into the skin once a week 13 pen 3    empagliflozin (JARDIANCE) 25 MG tablet Take 1 tablet by mouth every morning 90 tablet 3    NONFORMULARY daily Powdered Greens      NONFORMULARY daily Protein Powder      traZODone (DESYREL) 50 MG tablet take 1 tablet by mouth every evening 90 tablet 3    TRUEplus Lancets 33G MISC USE TWICE A  each 3    cetirizine (ZYRTEC) 5 MG tablet Take 1 tablet by mouth at bedtime 30 tablet 0    COLLAGEN PO Take by mouth      Blood Glucose Monitoring Suppl (ACCU-CHEK GUIDE) w/Device KIT CHECK BLOOD SUGAR DAILY      ACCU-CHEK SMARTVIEW strip TEST BLOOD SUGAR ONCE A DAY 50 strip 11    Cholecalciferol (VITAMIN D3) 2000 units CAPS Take by mouth daily       latanoprost (XALATAN) 0.005 % ophthalmic solution Place 1 drop into both eyes daily      OLIVE LEAF PO Take by mouth       NONFORMULARY daily PROSTATE SUPPORT      multivitamin (THERAGRAN) per tablet Take 1 tablet by mouth daily      Apple Sudhir Vn-Grn Tea-Bit Or-Cr (APPLE CIDER VINEGAR PLUS) TABS Take

## 2024-04-30 ENCOUNTER — CLINICAL DOCUMENTATION (OUTPATIENT)
Age: 69
End: 2024-04-30

## 2024-04-30 DIAGNOSIS — E11.65 TYPE 2 DIABETES MELLITUS WITH HYPERGLYCEMIA, WITHOUT LONG-TERM CURRENT USE OF INSULIN (HCC): Primary | ICD-10-CM

## 2024-05-09 NOTE — PLAN OF CARE
Patient Name: Monique Tierney  : 1959    MRN: [id number]                              Today's Date: 2024       Admit Date: 2024    Visit Dx:     ICD-10-CM ICD-9-CM   1. Status post left knee replacement  Z96.652 V43.65     Patient Active Problem List   Diagnosis    Goiter    Hypothyroidism    Biliary dyskinesia    Status post ARTHUR-BSO    Prediabetes    Osteoarthritis of both knees    Chronic pain of left thumb    Skipped beats    S/P total knee arthroplasty, right    Anxiety    Class 2 obesity due to excess calories without serious comorbidity with body mass index (BMI) of 39.0 to 39.9 in adult    Gastroesophageal reflux disease    Primary localized osteoarthritis of left knee    Status post left knee replacement     Past Medical History:   Diagnosis Date    Anxiety     Arthritis     Endometriosis     GERD (gastroesophageal reflux disease)     History of panic attacks     Hypothyroid     Palpitations     anxiety induced    PONV (postoperative nausea and vomiting)     Uterine leiomyoma      Past Surgical History:   Procedure Laterality Date    ENDOSCOPY N/A 02/10/2023    Procedure: ESOPHAGOGASTRODUODENOSCOPY with biopsy;  Surgeon: Ezra Iniguez MD;  Location: [facility] ENDOSCOPY;  Service: Gastroenterology;  Laterality: N/A;    EXPLORATORY LAPAROTOMY  2023    Patient said she had several prior to hysterectomy    HYSTERECTOMY Bilateral     Total Hysterectomy    LAPAROSCOPIC CHOLECYSTECTOMY  03/15/2022    MYOMECTOMY      OOPHORECTOMY      TOTAL KNEE ARTHROPLASTY Right 2021    Procedure: TOTAL KNEE ARTHROPLASTY RIGHT;  Surgeon: Fernando German MD;  Location: [facility] OR;  Service: Orthopedics;  Laterality: Right;      General Information       Row Name 24 1756 24 140       Physical Therapy Time and Intention    Document Type therapy note (daily note)  -HW evaluation  -HW    Mode of Treatment physical therapy  -HW physical therapy  -HW      Row Name 24 6488  Problem: Discharge Planning  Goal: Discharge to home or other facility with appropriate resources  Outcome: Progressing     Problem: Pain  Goal: Verbalizes/displays adequate comfort level or baseline comfort level  Outcome: Progressing 05/09/24 1406       General Information    Patient Profile Reviewed yes  -HW yes  -    Prior Level of Function -- min assist:;all household mobility;community mobility;gait;transfer;bed mobility;ADL's  caregiver for mother  -    Existing Precautions/Restrictions fall;other (see comments)  adductor canal nerve cath; NWB on RUE with brace  -HW fall;other (see comments)  adductor canal nerve cath; NWB on RUE with brace  -    Barriers to Rehab -- none identified  -      Row Name 05/09/24 1406          Living Environment    People in Home parent(s);other (see comments)  pt is caregiver for mother; sister planning on assisting at d/c  -HW       Row Name 05/09/24 1406          Home Main Entrance    Number of Stairs, Main Entrance other (see comments)  Ramp  -       Row Name 05/09/24 1406          Stairs Within Home, Primary    Stairs, Within Home, Primary BR in basement  -     Number of Stairs, Within Home, Primary twelve  -       Row Name 05/09/24 1756 05/09/24 1406       Cognition    Orientation Status (Cognition) oriented x 3  -HW oriented x 3  -HW      Row Name 05/09/24 1756 05/09/24 1406       Safety Issues, Functional Mobility    Safety Issues Affecting Function (Mobility) -- awareness of need for assistance;insight into deficits/self-awareness  -    Impairments Affecting Function (Mobility) balance;endurance/activity tolerance;pain;range of motion (ROM);strength  - balance;endurance/activity tolerance;pain;range of motion (ROM);strength  -              User Key  (r) = Recorded By, (t) = Taken By, (c) = Cosigned By      Initials Name Provider Type    HW Anna Thomason PT Physical Therapist                   Mobility       Row Name 05/09/24 1757 05/09/24 1409       Bed Mobility    Bed Mobility -- supine-sit  -    Supine-Sit Nursery (Bed Mobility) -- verbal cues;nonverbal cues (demo/gesture)  -    Assistive Device (Bed Mobility) -- bed rails;draw sheet;head of bed elevated  -    Comment,  (Bed Mobility) pt [facility]  - Difficulty scooting OOB without putting weight on R wrist. Encouraged to lean and use forearm instead of R hand  -      Row Name 05/09/24 1757 05/09/24 1409       Transfers    Comment, (Transfers) Good recall on hand placement  - Pt performed STS from EOB and from toilet with VC for hand placement to avoid WB throught RUE.  -      Row Name 05/09/24 1757 05/09/24 1409       Sit-Stand Transfer    Sit-Stand [place] (Transfers) contact guard;nonverbal cues (demo/gesture);verbal cues;2 person assist  -HW contact guard;nonverbal cues (demo/gesture);verbal cues;2 person assist  -    Assistive Device (Sit-Stand Transfers) walker, rolling platform  - walker, rolling platform  -HW      Row Name 05/09/24 1757 05/09/24 1409       Gait/Stairs (Locomotion)    [place] Level (Gait) contact guard;nonverbal cues (demo/gesture);verbal cues;2 person assist  -HW contact guard;nonverbal cues (demo/gesture);verbal cues;2 person assist  -    Assistive Device (Gait) walker, rolling platform  -HW walker, rolling platform  -HW    Patient was able to Ambulate -- yes  -    Distance in Feet (Gait) 100  -HW 80  -HW    Deviations/Abnormal Patterns (Gait) bilateral deviations;base of support, wide;weight shifting decreased;stride length decreased;gait speed decreased  -HW bilateral deviations;base of support, wide;weight shifting decreased;stride length decreased;gait speed decreased  -    Bilateral Gait Deviations forward flexed posture;heel strike decreased  - forward flexed posture;heel strike decreased  -    Left Sided Gait Deviations -- knee hyperextension  -    [place] Level (Stairs) contact guard;nonverbal cues (demo/gesture);verbal cues;2 person assist  -HW --    Assistive Device (Stairs) cane, straight  -HW --    Handrail Location (Stairs) right side (ascending)  - --    Number of Steps (Stairs) 10  -HW --    Ascending Technique (Stairs) step-to-step  -HW --    Descending  Technique (Stairs) step-to-step  - --    Comment, (Gait/Stairs) Pt amb with step-through gait pattern. Improved quad function and weight acceptance onto LLE. Pt navigated steps with VC for hand placement and sequencing. Lateral sway with descending with hesitation to put full weight onto LLE. No knee buckling noted. Activity limited by fatigue. No nausea reported this session.  - Pt amb in macario with step-through gait pattern. Short stride length, heavy reliance on FWW, slow gait speed, and forward flexed posture noted. VC for increased heel strike, increased L active knee extension in stance phase, and upright posture. Activity limited by reports of nausea. Pt returned to relined position. No significant drop in BP recorded. No knee buckling or LOB observed. L knee hyperextension noted abd painful.  -      Row Name 05/09/24 1757 05/09/24 1409       Mobility    Extremity Weight-bearing Status left lower extremity  - left lower extremity  -    Left Lower Extremity (Weight-bearing Status) weight-bearing as tolerated (WBAT)  - weight-bearing as tolerated (WBAT)  -              User Key  (r) = Recorded By, (t) = Taken By, (c) = Cosigned By      Initials Name Provider Type     Anna Thomason PT Physical Therapist                   Obj/Interventions       Row Name 05/09/24 1422          Range of Motion Comprehensive    General Range of Motion lower extremity range of motion deficits identified  -     Comment, General Range of Motion Surgical knee ROM: 12-68  -       Row Name 05/09/24 1422          Strength Comprehensive (MMT)    General Manual Muscle Testing (MMT) Assessment lower extremity strength deficits identified  -     Comment, General Manual Muscle Testing (MMT) Assessment Pt able to perform B active ankle DF and SLR  -       Row Name 05/09/24 1422          Motor Skills    Therapeutic Exercise knee;ankle  -       Row Name 05/09/24 1422          Knee (Therapeutic Exercise)    Knee  (Therapeutic Exercise) isometric exercises;strengthening exercise  -     Knee Isometrics (Therapeutic Exercise) quad sets;left;10 repetitions  -     Knee Strengthening (Therapeutic Exercise) SLR (straight leg raise);SAQ (short arc quad);LAQ (long arc quad);heel slides;left;10 repetitions  -[facility] Name 05/09/24 1422          Ankle (Therapeutic Exercise)    Ankle (Therapeutic Exercise) AROM (active range of motion)  -     Ankle AROM (Therapeutic Exercise) bilateral;dorsiflexion;plantarflexion;10 repetitions  -[facility] Name 05/09/24 1422          Balance    Balance Assessment sitting static balance;sitting dynamic balance;sit to stand dynamic balance;standing static balance;standing dynamic balance  -     Static Sitting Balance standby assist  -     Dynamic Sitting Balance standby assist  -     Position, Sitting Balance sitting edge of bed  -     Static Standing Balance contact guard  -     Dynamic Standing Balance contact guard  -     Position/Device Used, Standing Balance supported;walker, rolling  -     Balance Interventions sitting;standing;sit to stand;occupation based/functional task  -[facility] Name 05/09/24 1422          Sensory Assessment (Somatosensory)    Sensory Assessment (Somatosensory) LE sensation intact  -               User Key  (r) = Recorded By, (t) = Taken By, (c) = Cosigned By      Initials Name Provider Type     Anna Thomason, PT Physical Therapist                   Goals/Plan       [facility] Name 05/09/24 1425          Bed Mobility Goal 1 (PT)    Activity/Assistive Device (Bed Mobility Goal 1, PT) sit to supine/supine to sit  -     [place] Level/Cues Needed (Bed Mobility Goal 1, PT) modified independence  -     Time Frame (Bed Mobility Goal 1, PT) long term goal (LTG);3 days  -[facility] Name 05/09/24 1425          Transfer Goal 1 (PT)    Activity/Assistive Device (Transfer Goal 1, PT) sit-to-stand/stand-to-sit  -     [place] Level/Cues Needed  (Transfer Goal 1, PT) modified independence  -HW     Time Frame (Transfer Goal 1, PT) long term goal (LTG);3 days  -       Row Name 05/09/24 1425          Gait Training Goal 1 (PT)    Activity/Assistive Device (Gait Training Goal 1, PT) gait (walking locomotion)  -     [place] Level (Gait Training Goal 1, PT) modified independence  -HW     Distance (Gait Training Goal 1, PT) 500  -HW     Time Frame (Gait Training Goal 1, PT) long term goal (LTG);3 days  -       Row Name 05/09/24 1425          ROM Goal 1 (PT)    ROM Goal 1 (PT) Surgical Knee ROM: 0-90  -HW     Time Frame (ROM Goal 1, PT) long-term goal (LTG);3 days  -       Row Name 05/09/24 1425          Stairs Goal 1 (PT)    Activity/Assistive Device (Stairs Goal 1, PT) ascending stairs;descending stairs  -     [place] Level/Cues Needed (Stairs Goal 1, PT) modified independence  -HW     Number of Stairs (Stairs Goal 1, PT) 13  -HW     Time Frame (Stairs Goal 1, PT) long term goal (LTG);3 days  -       Row Name 05/09/24 1425          Therapy Assessment/Plan (PT)    Planned Therapy Interventions (PT) balance training;bed mobility training;gait training;home exercise program;ROM (range of motion);patient/family education;stair training;strengthening;stretching;transfer training  -               User Key  (r) = Recorded By, (t) = Taken By, (c) = Cosigned By      Initials Name Provider Type     Anna Thomason, PT Physical Therapist                   Clinical Impression       Row Name 05/09/24 1802 05/09/24 1423       Pain    Pretreatment Pain Rating 4/10  - 7/10  -HW    Posttreatment Pain Rating 4/10  - 7/10  -HW    Pain Location - Side/Orientation Left  - Left  -    Pain Location generalized  - generalized  -    Pain Location - knee  - knee  -    Pain Intervention(s) Repositioned;Cold applied;Ambulation/increased activity;Elevated  - Ambulation/increased activity;Repositioned;Elevated;Cold applied  -      Row Name 05/09/24  1802 05/09/24 1423       Plan of Care Review    Plan of Care Reviewed With patient;sibling  -HW patient;sibling  -HW    Progress improving  - no change  -    Outcome Evaluation Pt amb 100' with FWW and CGAx2. pt navigated 10 steps with SPC and R HR. Decreased weight shifting onto LLE noted with stair training. Improved with VC. Activity limited by fatigue. No reports of nausea noted. Recommend d/c home with assist and OPPT when medically appropriate. Pt cleared to d/c today from PT standpoint.  - Pt amb 80' with FWW and CGAx2. No knee buckling or LOB observed. L knee hyperextension noted. Activity limited by nausea. HEP and precautions reviewed. Frequent ambulation with nsg encouraged. Recommend d/c home with assist and OPPT when medically appropriate. Plan to assess stairs prior to d/c.  -      Row Name 05/09/24 1423          Therapy Assessment/Plan (PT)    Rehab Potential (PT) good, to achieve stated therapy goals  -     Criteria for Skilled Interventions Met (PT) yes;meets criteria;skilled treatment is necessary  -     Therapy Frequency (PT) 2 times/day  -       Row Name 05/09/24 1802 05/09/24 1423       Vital Signs    Pre Systolic BP Rehab -- 100  -HW    Pre Treatment Diastolic BP -- 66  -HW    Post Systolic BP Rehab -- 121  -HW    Post Treatment Diastolic BP -- 67  -HW    Pre Patient Position Sitting  -HW Supine  -HW    Intra Patient Position Standing  -HW Standing  -HW    Post Patient Position Sitting  -HW Sitting  -      Row Name 05/09/24 1802 05/09/24 1423       Positioning and Restraints    Pre-Treatment Position sitting in chair/recliner  - in bed  -HW    Post Treatment Position chair  -HW chair  -HW    In Chair notified nsg;reclined;sitting;call light within reach;encouraged to call for assist;exit alarm on;with family/caregiver;legs elevated;compression device  - notified nsg;reclined;sitting;call light within reach;encouraged to call for assist;exit alarm on;with  family/caregiver;legs elevated;compression device  -              User Key  (r) = Recorded By, (t) = Taken By, (c) = Cosigned By      Initials Name Provider Type    HW Anna Thomason, ANNIE Physical Therapist                   Outcome Measures       Row Name 05/09/24 1804 05/09/24 1425       How much help from another person do you currently need...    Turning from your back to your side while in flat bed without using bedrails? 3  -HW 3  -HW    Moving from lying on back to sitting on the side of a flat bed without bedrails? 3  -HW 3  -HW    Moving to and from a bed to a chair (including a wheelchair)? 3  -HW 3  -HW    Standing up from a chair using your arms (e.g., wheelchair, bedside chair)? 3  -HW 3  -HW    Climbing 3-5 steps with a railing? 3  -HW 3  -HW    To walk in hospital room? 3  -HW 3  -HW    AM-PAC 6 Clicks Score (PT) 18  -HW 18  -HW    Highest Level of Mobility Goal 6 --> Walk 10 steps or more  -HW 6 --> Walk 10 steps or more  -      Row Name 05/09/24 1315          How much help from another person do you currently need...    Turning from your back to your side while in flat bed without using bedrails? 3  -PT     Moving from lying on back to sitting on the side of a flat bed without bedrails? 2  -PT     Moving to and from a bed to a chair (including a wheelchair)? 2  -PT     Standing up from a chair using your arms (e.g., wheelchair, bedside chair)? 2  -PT     Climbing 3-5 steps with a railing? 2  -PT     To walk in hospital room? 3  -PT     AM-PAC 6 Clicks Score (PT) 14  -PT     Highest Level of Mobility Goal 4 --> Transfer to chair/commode  -PT       Row Name 05/09/24 1425          PADD    Diagnosis 1  -HW     Gender 1  -HW     Age Group 2  -HW     Gait Distance 0  -HW     Assist Level 1  -HW     Home Support 3  -HW     PADD Score 8  -HW     Patient Preference home with outpatient rehab  -HW     Prediction by PADD Score directly home (with home health or out-patient rehab)  -       Row Name 05/09/24  1804 05/09/24 1425       Functional Assessment    Outcome Measure Options AM-PAC 6 Clicks Basic Mobility (PT)  - AM-PAC 6 Clicks Basic Mobility (PT);PADD  -              User Key  (r) = Recorded By, (t) = Taken By, (c) = Cosigned By      Initials Name Provider Type     Anna Thomason PT Physical Therapist    PT Ping Montiel, RN Registered Nurse                                 Physical Therapy Education       Title: PT OT SLP Therapies (In Progress)       Topic: Physical Therapy (Done)       Point: Mobility training (Done)       Learning Progress Summary             Patient Acceptance, E,D, VU,DU by  at 5/9/2024 1804    Acceptance, E,D, VU,DU by  at 5/9/2024 1425                         Point: Home exercise program (Done)       Learning Progress Summary             Patient Acceptance, E,D, VU,DU by  at 5/9/2024 1804    Acceptance, E,D, VU,DU by  at 5/9/2024 1425                         Point: Body mechanics (Done)       Learning Progress Summary             Patient Acceptance, E,D, VU,DU by  at 5/9/2024 1804    Acceptance, E,D, VU,DU by  at 5/9/2024 1425                         Point: Precautions (Done)       Learning Progress Summary             Patient Acceptance, E,D, VU,DU by  at 5/9/2024 1804    Acceptance, E,D, VU,DU by  at 5/9/2024 1425                                         User Key       Initials Effective Dates Name Provider Type Discipline     12/15/23 -  Anna Thomason PT Physical Therapist PT                  PT Recommendation and Plan  Planned Therapy Interventions (PT): balance training, bed mobility training, gait training, home exercise program, ROM (range of motion), patient/family education, stair training, strengthening, stretching, transfer training  Plan of Care Reviewed With: patient, sibling  Progress: improving  Outcome Evaluation: Pt amb 100' with FWW and CGAx2. pt navigated 10 steps with SPC and R HR. Decreased weight shifting onto LLE noted with stair training.  Improved with VC. Activity limited by fatigue. No reports of nausea noted. Recommend d/c home with assist and OPPT when medically appropriate. Pt cleared to d/c today from PT standpoint.     Time Calculation:   PT Evaluation Complexity  History, PT Evaluation Complexity: 1-2 personal factors and/or comorbidities  Examination of Body Systems (PT Eval Complexity): total of 3 or more elements  Clinical Presentation (PT Evaluation Complexity): evolving  Clinical Decision Making (PT Evaluation Complexity): moderate complexity  Overall Complexity (PT Evaluation Complexity): moderate complexity     PT Charges       Row Name 05/09/24 1804 05/09/24 1426          Time Calculation    Start Time 1518  -HW 1323  -HW     PT Received On 05/09/24  -HW 05/09/24  -HW     PT Goal Re-Cert Due Date -- 05/19/24  -HW        Timed Charges    82322 - PT Therapeutic Exercise Minutes -- 12  -HW     79299 - Gait Training Minutes  23  -HW 12  -HW        Untimed Charges    PT Eval/Re-eval Minutes -- 36  -HW        Total Minutes    Timed Charges Total Minutes 23  -HW 24  -HW     Untimed Charges Total Minutes -- 36  -HW      Total Minutes 23  -HW 60  -HW               User Key  (r) = Recorded By, (t) = Taken By, (c) = Cosigned By      Initials Name Provider Type     Anna Thomason, PT Physical Therapist                  Therapy Charges for Today       Code Description Service Date Service Provider Modifiers Qty    03485156623 HC PT THER PROC EA 15 MIN 5/9/2024 Anna Thomason, PT GP 1    45598407592 HC GAIT TRAINING EA 15 MIN 5/9/2024 Anna Thomason, PT GP 1    80287649349 HC PT THER SUPP EA 15 MIN 5/9/2024 Anna Thomason, PT GP 3    58768493441 HC PT EVAL MOD COMPLEXITY 3 5/9/2024 Anna Thomason, PT GP 1    41295316097 HC GAIT TRAINING EA 15 MIN 5/9/2024 Anna Thomason, PT GP 2    03274714158 HC PT THER SUPP EA 15 MIN 5/9/2024 Anna Thomason, PT GP 2            PT G-Codes  Outcome Measure Options: AM-PAC 6 Clicks Basic Mobility (PT)  AM-PAC 6 Clicks Score  (PT): 18  PT Discharge Summary  Anticipated Discharge Disposition (PT): home with assist, home with outpatient therapy services    Anna Thomason, PT  5/9/2024

## 2024-05-13 ENCOUNTER — CARE COORDINATION (OUTPATIENT)
Dept: CARE COORDINATION | Age: 69
End: 2024-05-13

## 2024-05-13 NOTE — CARE COORDINATION
Nicole left me a message regarding [employer] system and if they were any discounts. I called her back and let her know I don't do DME products. I suggested she looks into the manufacturers website for any programs.        Tricia Villeda [facility]  CC   Medication Assistance  [place] and [place] [employer]    (P) [phone]  (F) [phone]

## 2024-05-15 ENCOUNTER — TELEPHONE (OUTPATIENT)
Age: 69
End: 2024-05-15

## 2024-05-15 NOTE — TELEPHONE ENCOUNTER
Pt's wife lv called in stating that we gave him trial of joaquín 3, he would like to continue using this can you send this in to [facility] on [address] for patient.

## 2024-05-16 ENCOUNTER — CLINICAL DOCUMENTATION (OUTPATIENT)
Age: 69
End: 2024-05-16

## 2024-05-16 DIAGNOSIS — E11.65 TYPE 2 DIABETES MELLITUS WITH HYPERGLYCEMIA, WITHOUT LONG-TERM CURRENT USE OF INSULIN (HCC): Primary | ICD-10-CM

## 2024-05-16 RX ORDER — BLOOD-GLUCOSE SENSOR
EACH MISCELLANEOUS
Qty: 6 EACH | Refills: 1 | Status: SHIPPED | OUTPATIENT
Start: 2024-05-16 | End: 2024-05-21 | Stop reason: SDUPTHER

## 2024-05-21 DIAGNOSIS — E11.65 TYPE 2 DIABETES MELLITUS WITH HYPERGLYCEMIA, WITHOUT LONG-TERM CURRENT USE OF INSULIN (HCC): ICD-10-CM

## 2024-05-21 RX ORDER — BLOOD-GLUCOSE SENSOR
EACH MISCELLANEOUS
Qty: 6 EACH | Refills: 1 | Status: SHIPPED | OUTPATIENT
Start: 2024-05-21

## 2024-05-29 ENCOUNTER — OFFICE VISIT (OUTPATIENT)
Dept: INTERNAL MEDICINE CLINIC | Age: 69
End: 2024-05-29
Payer: MEDICARE

## 2024-05-29 DIAGNOSIS — E11.9 CONTROLLED TYPE 2 DIABETES MELLITUS WITHOUT COMPLICATION, WITHOUT LONG-TERM CURRENT USE OF INSULIN (HCC): Primary | ICD-10-CM

## 2024-05-29 PROCEDURE — NBSRV NON-BILLABLE SERVICE: Performed by: PHARMACIST

## 2024-05-29 PROCEDURE — G0108 DIAB MANAGE TRN  PER INDIV: HCPCS | Performed by: PHARMACIST

## 2024-05-29 NOTE — PATIENT INSTRUCTIONS
You many be eligible to receive a free supply of continuous blood sugar monitors through the [facility], [place], operated by [facility]:  Call [phone] if interested  -You could also periodically consider purchasing a single Carlyn 3 sensor    2. Consider a low dose of a water-soluble statin (rosuvatatin OR pravastatin)    3. Blood Sugar Goals:  -Fasting AM:    -Before lunch/dinner:    -2 hours after eating/at bedtime: 100-180   -A1c goal: 6-7%    4. Aim for 45 grams carb for most meals + protein     Larger meals: 60 grams for most + protein    -Consider some protein snacks: mixed nuts, hard boiled eggs, low fat cheese sticks, protein granola bars (10-15 grams protein, 20 grams of carb or less)    5. Bring a snack with you when you are working outside

## 2024-05-29 NOTE — PROGRESS NOTES
Maintenance:  Diabetes Management   Topic Date Due    Diabetic Alb to Cr ratio (uACR) test  05/21/2022    Lipids  02/17/2024    Diabetic foot exam  02/21/2024    Diabetic retinal exam  04/25/2024       Eye exam current (within one year): yes Date: 3/2024, [facility]  Any history of foot problems? no  Foot exam current: yes Date: 4/2024, Dr. Vila; follows with Dr.Heather Roque  Immunizations up to date:    Pneumococcal: yes   Influenza: no  The ASCVD Risk score (Thomas CARLOS, et al., 2019) failed to calculate for the following reasons:    The patient has a prior MI or stroke diagnosis   Last panel - LDL:   Lab Results   Component Value Date    LDLDIRECT 99 05/08/2015     Taking ASA:  No   Taking statin: No - indicated  Last microalbumin/creatinine ratio:   Microalb/Creat Ratio   Date Value Ref Range Status   05/21/2021 13.4 0.0 - 30.0 mg/g creat Final     Comment:     Performed at [facility]  [address] [address]        Taking ACE/ARB: Yes- benazepril  Depression: Not at risk (1/10/2024)    PHQ-2     PHQ-2 Score: 0         Lab Results   Component Value Date/Time    LABA1C 7.2 11/17/2023 09:38 AM    BUN 11 01/06/2024 05:29 AM    CREATININE 0.8 01/06/2024 05:29 AM     Est, Glom Filt Rate   Date Value Ref Range Status   01/06/2024 >60 >60 ml/min/1.73m2 Final       Vitals:    05/29/24 1040 05/29/24 1041   BP: (!) 177/72 (!) 162/82   Pulse: 73    Temp: 97.7 °F (36.5 °C)    Weight: 77.4 kg (170 lb 9.6 oz)      Wt Readings from Last 3 Encounters:   04/24/24 78.5 kg (173 lb)   02/28/24 76.2 kg (168 lb)   02/23/24 75.7 kg (166 lb 12.8 oz)     Ht Readings from Last 3 Encounters:   04/24/24 1.829 m (6')   02/28/24 1.829 m (6')   02/23/24 1.829 m (6')       Focus:   Diabetes Education. Initial A1C: 7.2% (11/17/23); repeat ordered with upcoming labwork. Adam was able to use a trial CGM, which he felt helped him to improve his glycemic control. Unfortunately, his insurance will not cover CGM (due to

## 2024-06-14 LAB
ALBUMIN SERPL-MCNC: 4.4 G/DL
ALP BLD-CCNC: 92 U/L
ALT SERPL-CCNC: 33 U/L
ANION GAP SERPL CALCULATED.3IONS-SCNC: 12 MMOL/L
AST SERPL-CCNC: 26 U/L
BILIRUB SERPL-MCNC: 0.5 MG/DL (ref 0.1–1.4)
BUN BLDV-MCNC: 15 MG/DL
C-PEPTIDE: 1.6
CALCIUM SERPL-MCNC: 9.3 MG/DL
CHLORIDE BLD-SCNC: 102 MMOL/L
CHOLESTEROL, TOTAL: 140 MG/DL
CHOLESTEROL/HDL RATIO: 2.5
CO2: 25 MMOL/L
CREAT SERPL-MCNC: 0.81 MG/DL
CREATININE URINE: 119.1 MG/DL
EGFR: 97
ESTIMATED AVERAGE GLUCOSE: 186
GLUCOSE BLD-MCNC: 144 MG/DL
HBA1C MFR BLD: 8.1 %
HDLC SERPL-MCNC: 56 MG/DL (ref 35–70)
LDL CHOLESTEROL CALCULATED: 72 MG/DL (ref 0–160)
MICROALBUMIN/CREAT 24H UR: 1.4 MG/DL
MICROALBUMIN/CREAT UR-RTO: 12 MG/G
NONHDLC SERPL-MCNC: NORMAL MG/DL
POTASSIUM SERPL-SCNC: 4.3 MMOL/L
SODIUM BLD-SCNC: 139 MMOL/L
TOTAL PROTEIN: 6.5
TRIGL SERPL-MCNC: 60 MG/DL
VLDLC SERPL CALC-MCNC: 12 MG/DL

## 2024-06-17 ENCOUNTER — OFFICE VISIT (OUTPATIENT)
Dept: INTERNAL MEDICINE CLINIC | Age: 69
End: 2024-06-17
Payer: MEDICARE

## 2024-06-17 VITALS — WEIGHT: 168 LBS | BODY MASS INDEX: 22.78 KG/M2

## 2024-06-17 DIAGNOSIS — E11.65 TYPE 2 DIABETES MELLITUS WITH HYPERGLYCEMIA, WITHOUT LONG-TERM CURRENT USE OF INSULIN (HCC): Primary | ICD-10-CM

## 2024-06-17 PROCEDURE — 97802 MEDICAL NUTRITION INDIV IN: CPT | Performed by: DIETITIAN, REGISTERED

## 2024-06-17 NOTE — PROGRESS NOTES
Food recall.      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 States BS have improved. Per dietary recall concerns regarding sugar beverages, fast food and increase sugary brefast/snacks at times. Pt dislikes vegetables.   -Instructed the patient on: Carbohydrate Counting, Consistent Carbohydrate Intake, Meal Planning for Regular, Balanced Meals & Snacks, Plate Method, The Importance of Regular Physical Activity, and treating low blood sugars.   -Handouts given for: carbohydrate counting, healthy snacks, plate method, recipies, and sample meal plans/menus.    -Nutrition prescription: 45-60 gm carb at meals, 2-4 oz pro, 10-15 gm fat per meal.     Comprehension verified using teachback method.    Monitoring/Evaluation:   -Followup visit: 8/28 Clinic Pharm D, 1/20 clinic RD.  -Receptiveness to education/goals: Agreeable.  -Evaluation of education: Indicates understanding.  -Readiness to change: action - ready to set action plan and implement dietary changes.  -Expected compliance: good.    Thank you for your referral of this patient.     Total time involved in direct patient education: 60 minutes for initial MNT visit.

## 2024-06-28 DIAGNOSIS — E11.65 TYPE 2 DIABETES MELLITUS WITH HYPERGLYCEMIA, WITHOUT LONG-TERM CURRENT USE OF INSULIN (HCC): Primary | ICD-10-CM

## 2024-07-01 RX ORDER — BLOOD SUGAR DIAGNOSTIC
STRIP MISCELLANEOUS
Qty: 100 STRIP | Refills: 3 | Status: SHIPPED | OUTPATIENT
Start: 2024-07-01

## 2024-08-05 ENCOUNTER — OFFICE VISIT (OUTPATIENT)
Dept: FAMILY MEDICINE CLINIC | Age: 69
End: 2024-08-05
Payer: MEDICARE

## 2024-08-05 VITALS
TEMPERATURE: 97.7 F | HEART RATE: 51 BPM | SYSTOLIC BLOOD PRESSURE: 118 MMHG | BODY MASS INDEX: 22.65 KG/M2 | DIASTOLIC BLOOD PRESSURE: 74 MMHG | WEIGHT: 167 LBS | OXYGEN SATURATION: 100 %

## 2024-08-05 DIAGNOSIS — N35.919 STRICTURE OF MALE URETHRA, UNSPECIFIED STRICTURE TYPE: ICD-10-CM

## 2024-08-05 DIAGNOSIS — R35.0 FREQUENCY OF URINATION: Primary | ICD-10-CM

## 2024-08-05 DIAGNOSIS — Z78.9 SELF-CATHETERIZES URINARY BLADDER: ICD-10-CM

## 2024-08-05 DIAGNOSIS — R31.9 URINARY TRACT INFECTION WITH HEMATURIA, SITE UNSPECIFIED: ICD-10-CM

## 2024-08-05 DIAGNOSIS — N39.0 URINARY TRACT INFECTION WITH HEMATURIA, SITE UNSPECIFIED: ICD-10-CM

## 2024-08-05 PROBLEM — F19.21 HISTORY OF SUBSTANCE DEPENDENCE (HCC): Status: RESOLVED | Noted: 2023-08-15 | Resolved: 2024-08-05

## 2024-08-05 LAB
BILIRUBIN, POC: ABNORMAL
BLOOD URINE, POC: ABNORMAL
CLARITY, POC: ABNORMAL
COLOR, POC: YELLOW
GLUCOSE URINE, POC: ABNORMAL
KETONES, POC: ABNORMAL
LEUKOCYTE EST, POC: ABNORMAL
NITRITE, POC: ABNORMAL
PH, POC: 7
PROTEIN, POC: ABNORMAL
SPECIFIC GRAVITY, POC: 1.01
UROBILINOGEN, POC: 0.2

## 2024-08-05 PROCEDURE — 1124F ACP DISCUSS-NO DSCNMKR DOCD: CPT | Performed by: NURSE PRACTITIONER

## 2024-08-05 PROCEDURE — 3074F SYST BP LT 130 MM HG: CPT | Performed by: NURSE PRACTITIONER

## 2024-08-05 PROCEDURE — 3078F DIAST BP <80 MM HG: CPT | Performed by: NURSE PRACTITIONER

## 2024-08-05 PROCEDURE — 99213 OFFICE O/P EST LOW 20 MIN: CPT | Performed by: NURSE PRACTITIONER

## 2024-08-05 PROCEDURE — 3017F COLORECTAL CA SCREEN DOC REV: CPT | Performed by: NURSE PRACTITIONER

## 2024-08-05 PROCEDURE — G8427 DOCREV CUR MEDS BY ELIG CLIN: HCPCS | Performed by: NURSE PRACTITIONER

## 2024-08-05 PROCEDURE — G8420 CALC BMI NORM PARAMETERS: HCPCS | Performed by: NURSE PRACTITIONER

## 2024-08-05 PROCEDURE — 81003 URINALYSIS AUTO W/O SCOPE: CPT | Performed by: NURSE PRACTITIONER

## 2024-08-05 PROCEDURE — 1036F TOBACCO NON-USER: CPT | Performed by: NURSE PRACTITIONER

## 2024-08-05 RX ORDER — TAMSULOSIN HYDROCHLORIDE 0.4 MG/1
0.4 CAPSULE ORAL DAILY
Qty: 30 CAPSULE | Refills: 0 | Status: SHIPPED | OUTPATIENT
Start: 2024-08-05

## 2024-08-05 RX ORDER — CIPROFLOXACIN 500 MG/1
500 TABLET, FILM COATED ORAL 2 TIMES DAILY
Qty: 20 TABLET | Refills: 0 | Status: SHIPPED | OUTPATIENT
Start: 2024-08-05 | End: 2024-08-15

## 2024-08-05 SDOH — ECONOMIC STABILITY: FOOD INSECURITY: WITHIN THE PAST 12 MONTHS, THE FOOD YOU BOUGHT JUST DIDN'T LAST AND YOU DIDN'T HAVE MONEY TO GET MORE.: NEVER TRUE

## 2024-08-05 SDOH — ECONOMIC STABILITY: FOOD INSECURITY: WITHIN THE PAST 12 MONTHS, YOU WORRIED THAT YOUR FOOD WOULD RUN OUT BEFORE YOU GOT MONEY TO BUY MORE.: NEVER TRUE

## 2024-08-05 SDOH — ECONOMIC STABILITY: INCOME INSECURITY: HOW HARD IS IT FOR YOU TO PAY FOR THE VERY BASICS LIKE FOOD, HOUSING, MEDICAL CARE, AND HEATING?: NOT HARD AT ALL

## 2024-08-05 ASSESSMENT — ENCOUNTER SYMPTOMS
ABDOMINAL DISTENTION: 0
BACK PAIN: 0
WHEEZING: 0
SHORTNESS OF BREATH: 0
ABDOMINAL PAIN: 0
CHEST TIGHTNESS: 0
COUGH: 0

## 2024-08-05 NOTE — PROGRESS NOTES
[facility] PROFESSIONAL SERVS  [facility]  [address] [address]  Dept: [phone]  Dept Fax: [phone]  Loc: [phone]  PROGRESS NOTE      VisitDate: 8/5/2024    Bruce Cooper is a 69 y.o. male who presents today for:     Chief Complaint   Patient presents with    Urinary Frequency    Urinary Pain     Symptoms began last Wednesday; patient has drank cranberry juice to help with relief.          Subjective:  Patient presents with complaints of urinary frequency dysuria started last Wednesday.  Denies any fever hematuria flank pain.  Does report history of urethral stricture patient will occasionally self cath self dilate meatus.  History of BPH urethral stricture depression type 2 diabetes factor V hypertension and osteoarthritis sleep apnea and stroke right eye.          Review of Systems   Constitutional:  Negative for activity change, appetite change, chills, fatigue and fever.   Eyes:  Negative for visual disturbance.   Respiratory:  Negative for cough, chest tightness, shortness of breath and wheezing.    Cardiovascular:  Negative for chest pain, palpitations and leg swelling.   Gastrointestinal:  Negative for abdominal distention and abdominal pain.   Genitourinary:  Positive for dysuria and frequency. Negative for flank pain, hematuria, testicular pain and urgency.   Musculoskeletal:  Negative for arthralgias, back pain and neck pain.   Skin: Negative.  Negative for rash.   Neurological:  Negative for dizziness, light-headedness and headaches.   Hematological:  Negative for adenopathy.   Psychiatric/Behavioral:  Negative for decreased concentration and dysphoric mood.    All other systems reviewed and are negative.    Past Medical History:   Diagnosis Date    BPH (benign prostatic hyperplasia)     Depression     Diabetes mellitus (HCC)     Factor 5 Leiden mutation, heterozygous (HCC)     Heterozygous MTHFR mutation [id number]     Hyperglycemia

## 2024-08-14 ENCOUNTER — TELEPHONE (OUTPATIENT)
Dept: FAMILY MEDICINE CLINIC | Age: 69
End: 2024-08-14

## 2024-08-14 RX ORDER — EXENATIDE 2 MG/.65ML
2 INJECTION, SUSPENSION, EXTENDED RELEASE SUBCUTANEOUS WEEKLY
Qty: 13 PEN | Refills: 3 | Status: SHIPPED | OUTPATIENT
Start: 2024-08-14

## 2024-08-14 NOTE — TELEPHONE ENCOUNTER
Looks like the encounter from 12/11/23 Eusebio authorized the switch from ozempic to Bydurion. I did not request this but [facility] might need new refills for the patient.

## 2024-08-14 NOTE — TELEPHONE ENCOUNTER
I have a printed script that was requested for Bydureon. Preferred pharmacy says [facility]. Did you request this? We do not have any paperwork or notes on this request.

## 2024-08-19 VITALS
DIASTOLIC BLOOD PRESSURE: 82 MMHG | WEIGHT: 170.6 LBS | BODY MASS INDEX: 23.14 KG/M2 | TEMPERATURE: 97.7 F | SYSTOLIC BLOOD PRESSURE: 162 MMHG | HEART RATE: 73 BPM

## 2024-08-27 RX ORDER — TAMSULOSIN HYDROCHLORIDE 0.4 MG/1
CAPSULE ORAL DAILY
Qty: 90 CAPSULE | Refills: 1 | Status: SHIPPED | OUTPATIENT
Start: 2024-08-27 | End: 2024-08-28

## 2024-08-28 ENCOUNTER — OFFICE VISIT (OUTPATIENT)
Dept: INTERNAL MEDICINE CLINIC | Age: 69
End: 2024-08-28
Payer: MEDICARE

## 2024-08-28 VITALS
HEART RATE: 78 BPM | WEIGHT: 169.6 LBS | SYSTOLIC BLOOD PRESSURE: 122 MMHG | BODY MASS INDEX: 23 KG/M2 | DIASTOLIC BLOOD PRESSURE: 74 MMHG | TEMPERATURE: 97.2 F

## 2024-08-28 DIAGNOSIS — E11.9 CONTROLLED TYPE 2 DIABETES MELLITUS WITHOUT COMPLICATION, WITHOUT LONG-TERM CURRENT USE OF INSULIN (HCC): Primary | ICD-10-CM

## 2024-08-28 PROCEDURE — G0108 DIAB MANAGE TRN  PER INDIV: HCPCS | Performed by: PHARMACIST

## 2024-08-28 PROCEDURE — NBSRV NON-BILLABLE SERVICE: Performed by: PHARMACIST

## 2024-08-28 RX ORDER — UBIDECARENONE 75 MG
50 CAPSULE ORAL DAILY
COMMUNITY

## 2024-08-28 NOTE — PATIENT INSTRUCTIONS
You many be eligible to receive a free supply of continuous blood sugar monitors through the [facility]:  Call [phone] if interested  -You could also periodically consider purchasing a single Carlyn 3 sensor    2. Aim for closer to  grams of protein   -Add more protein snacks - cottage cheese, low fat dairy, nuts, lunch meat, protein powder, hard boiled eggs    3. We will plan to get Ozempic 2 mg weekly through the [employer] program

## 2024-08-28 NOTE — PROGRESS NOTES
[facility]  [address] [place] [address]  [phone] (phone)  [phone] (fax)    Patient ID: Bruce Cooper [id number]  Referring Provider: Dr. Vila     Patient's name and  were verified.    Subjective:    He presents for a follow-up diabetic visit. He has type 2 diabetes mellitus. He is compliant a majority of the time.  Assessment:     Lab Results   Component Value Date/Time    LABA1C 8.1 2024 10:10 AM    BUN 15 2024 10:10 AM    CREATININE 0.81 2024 10:10 AM     Vitals:    24 0941   BP: 122/74   Pulse: 78   Temp: 97.2 °F (36.2 °C)   Weight: 76.9 kg (169 lb 9.6 oz)     Wt Readings from Last 3 Encounters:   24 76.9 kg (169 lb 9.6 oz)   24 75.8 kg (167 lb)   24 76.2 kg (168 lb)     Ht Readings from Last 3 Encounters:   24 1.829 m (6')   24 1.829 m (6')   24 1.829 m (6')     Est, Glom Filt Rate   Date Value Ref Range Status   2024 97  Final         Diabetes Pharmacotherapy:  Jardiance 25mg daily  Bydureon 2 mg weekly - last batch was leaky    Glucose Trends:   Current monitoring regimen: Fingerstick blood tests - 0-1 times daily  Home blood sugar trends:               -Fasting AM: Average: 152, Range: 137-209   -Before lunch:   -Before dinner:   -Bedtime:  Any episodes of hypoglycemia? yes - once in the past month   -Treats with candy/small candy bar    Lifestyle Factors:   Previous visit with dietician: yes-2024  Current diet: Brunch: 12p protein powder OR drink PLUS bowl of cereal                       D: steak + sweet potato + salad OR poached eggs/sanchez + toast + fruit cup                       Snacks: frequent snacks/candy OR pudding cup, endorses frequent snacking                        Beverages: 12-14oz coffee w/ milk, water  Current exercise:  stretching exercises and push-up s in the morning , keeps moving    Health Maintenance:  Diabetes Management   Topic Date Due    Diabetic foot exam  2024    Diabetic

## 2024-09-09 ENCOUNTER — TELEMEDICINE (OUTPATIENT)
Dept: PULMONOLOGY | Age: 69
End: 2024-09-09
Payer: MEDICARE

## 2024-09-09 DIAGNOSIS — R06.3 CENTRAL SLEEP APNEA DUE TO CHEYNE-STOKES RESPIRATION: Primary | ICD-10-CM

## 2024-09-09 PROCEDURE — 99442 PR PHYS/QHP TELEPHONE EVALUATION 11-20 MIN: CPT

## 2024-09-09 ASSESSMENT — ENCOUNTER SYMPTOMS
COUGH: 0
RHINORRHEA: 0
SHORTNESS OF BREATH: 0
WHEEZING: 0
SORE THROAT: 0

## 2024-10-03 ENCOUNTER — TELEPHONE (OUTPATIENT)
Age: 69
End: 2024-10-03

## 2024-10-03 NOTE — TELEPHONE ENCOUNTER
Called patients spouse back regarding voicemail. Notified her that we did not receive the Ozempic. Told patients spouse Nicole to call the patients assistance program and see which office they sent it to. Told patients spouse to keep us updated.

## 2024-10-08 ENCOUNTER — CARE COORDINATION (OUTPATIENT)
Dept: CARE COORDINATION | Age: 69
End: 2024-10-08

## 2024-10-08 NOTE — CARE COORDINATION
I will be working on the applications through the manufacturers for assistance on the high cost medications for 2025. I was able to fax the provider and mail the patient their portion.        Tricia Villeda [facility]  CC   Medication Assistance  [place] and [place] [facility]    (P) [phone]  (F) [phone]

## 2024-10-11 ENCOUNTER — CARE COORDINATION (OUTPATIENT)
Dept: CARE COORDINATION | Age: 69
End: 2024-10-11

## 2024-10-11 ENCOUNTER — PATIENT MESSAGE (OUTPATIENT)
Dept: FAMILY MEDICINE CLINIC | Age: 69
End: 2024-10-11

## 2024-10-11 NOTE — CARE COORDINATION
Spoke with Nicole who stated the Jardiance is over 400.00 at the pharmacy. I told her I will check with Eusebio Agudelo to see if Farxiga is an option to change to and I can check online to see if he is eligible.        Tricia Villeda [facility]  CC   Medication Assistance  [place] and [place] [facility]    (P) [phone]  (F) [phone]

## 2024-10-14 ENCOUNTER — TELEPHONE (OUTPATIENT)
Dept: FAMILY MEDICINE CLINIC | Age: 69
End: 2024-10-14

## 2024-10-14 RX ORDER — DAPAGLIFLOZIN 10 MG/1
10 TABLET, FILM COATED ORAL EVERY MORNING
Qty: 90 TABLET | Refills: 3 | Status: SHIPPED | OUTPATIENT
Start: 2024-10-14

## 2024-10-14 RX ORDER — DAPAGLIFLOZIN 10 MG/1
10 TABLET, FILM COATED ORAL EVERY MORNING
Qty: 28 TABLET | Refills: 0 | Status: SHIPPED | COMMUNITY
Start: 2024-10-14 | End: 2024-10-14 | Stop reason: SDUPTHER

## 2024-10-14 NOTE — TELEPHONE ENCOUNTER
October 14, 2024  Tricia Villeda   to [facility] Clinical Staff  Eusebio Agudelo APRN - EDWIN[place]   AM      10/14/24 10:36 AM  Patient approved into the PAP program through [facility] for Farxiga, they will need you to fax a 90 day supply with 3 refills script to [phone] patient ID is PEP_[id number]. Thank you  Tricia Villeda     AM    10/14/24 10:36 AM  Unsigned Note      I was able to get the patient enrolled [facility]. I sent the office a message of the fax number they will need to send in the script. He is enrolled from now until 12/31/25.

## 2024-10-14 NOTE — TELEPHONE ENCOUNTER
We are changing Adam from jardiance 25 mg to Farxiga 10 mg. Adam and Eusebio would like to know if he can do patient assistance with Farxiga 10 mg? What do we need to do?

## 2024-10-14 NOTE — TELEPHONE ENCOUNTER
Please have Tricia Villeda review.  I am okay with switching patient to Farxiga if this is easier for assistance.

## 2024-10-14 NOTE — TELEPHONE ENCOUNTER
Hello    I am the one that asked Eusebio if we could switch to Farxiga, I am currently trying to get him enrolled online. Their website has updated and I am having issues. I will let your office know when and if I need a script to send to the company.

## 2024-10-14 NOTE — CARE COORDINATION
I was able to get the patient enrolled online through [employer] for [place]. I sent the office a message of the fax number they will need to send in the script. He is enrolled from now until 12/31/25.

## 2024-10-17 ENCOUNTER — CARE COORDINATION (OUTPATIENT)
Dept: CARE COORDINATION | Age: 69
End: 2024-10-17

## 2024-10-17 NOTE — CARE COORDINATION
Nicole (spouse) called me back and I explained to her that the patient was approved into the Farxiga program and should be getting the shipment soon.       Tricia Villeda [facility]  CC   Medication Assistance  [place] and [employer]    (P) [phone]  (F) [phone]

## 2024-11-01 ENCOUNTER — CARE COORDINATION (OUTPATIENT)
Dept: CARE COORDINATION | Age: 69
End: 2024-11-01

## 2024-11-01 NOTE — CARE COORDINATION
Nicole (spouse) stated Adam has not gotten his Farxiga from the PAP program yet. I called and [facility] have not gotten the script yet that was faxed on 10/14.        Tricia Villeda [facility]  CC   Medication Assistance  [place] and [employer]    (P) [phone]  (F) [phone]

## 2024-11-04 NOTE — TELEPHONE ENCOUNTER
I called [facility], they have the script and they are sending it to be expedited, so he will hopefully get it before 11-14-24.

## 2024-11-07 ENCOUNTER — CARE COORDINATION (OUTPATIENT)
Dept: CARE COORDINATION | Age: 69
End: 2024-11-07

## 2024-11-07 NOTE — CARE COORDINATION
Nicole called asking about the patients shipment of Farxiga. I see the tracking states it will arrive by today.      Tricia Villeda [facility]  CC   Medication Assistance  [place] and [employer]    (P) [phone]  (F) [phone]

## 2024-11-15 ENCOUNTER — PATIENT MESSAGE (OUTPATIENT)
Dept: FAMILY MEDICINE CLINIC | Age: 69
End: 2024-11-15

## 2024-11-15 DIAGNOSIS — R73.9 HYPERGLYCEMIA: Chronic | ICD-10-CM

## 2024-11-15 DIAGNOSIS — I48.91 ATRIAL FIBRILLATION WITH RVR (HCC): ICD-10-CM

## 2024-11-15 DIAGNOSIS — E11.65 TYPE 2 DIABETES MELLITUS WITH HYPERGLYCEMIA, WITHOUT LONG-TERM CURRENT USE OF INSULIN (HCC): Primary | ICD-10-CM

## 2024-11-18 ENCOUNTER — TELEPHONE (OUTPATIENT)
Dept: CARDIOLOGY CLINIC | Age: 69
End: 2024-11-18

## 2024-11-18 NOTE — TELEPHONE ENCOUNTER
Patient's wife Nicole, on HIPAA called stating patient had an episode of A-fib over the weekend where HR was , /60, oxygen level dropped and heart burn.   Pt didn't go to ER.   I called him back and no episodes since then.  I advised patient if he has any of those symptoms again he needs to go to the ER.   Appt made with Chad on 11/21/2024 at 145pm.   They confirmed appt date, time and location.

## 2024-11-18 NOTE — TELEPHONE ENCOUNTER
Recommend a CBC CMP A1c TSH free T4 A-fib type 2 diabetes hyperglycemia.  Recommend the patient consult with his cardiologist due to his recent symptoms.

## 2024-11-20 RX ORDER — TRAZODONE HYDROCHLORIDE 50 MG/1
TABLET, FILM COATED ORAL
Qty: 90 TABLET | Refills: 3 | Status: SHIPPED | OUTPATIENT
Start: 2024-11-20

## 2024-11-21 ENCOUNTER — OFFICE VISIT (OUTPATIENT)
Dept: CARDIOLOGY CLINIC | Age: 69
End: 2024-11-21

## 2024-11-21 VITALS
DIASTOLIC BLOOD PRESSURE: 82 MMHG | WEIGHT: 175 LBS | BODY MASS INDEX: 23.7 KG/M2 | SYSTOLIC BLOOD PRESSURE: 146 MMHG | HEART RATE: 74 BPM | HEIGHT: 72 IN

## 2024-11-21 DIAGNOSIS — I48.0 PAF (PAROXYSMAL ATRIAL FIBRILLATION) (HCC): Primary | ICD-10-CM

## 2024-11-21 DIAGNOSIS — I10 ESSENTIAL HYPERTENSION: ICD-10-CM

## 2024-11-21 NOTE — PROGRESS NOTES
[facility] [place] [facility]  [facility] CARDIOLOGY  [address]  [facility] [address]  Dept: [phone]  Dept Fax: [phone]  Loc: [phone]    Visit Date: 11/21/2024    Bruce Cooper is a 69 y.o. male who presents todayfor:  Chief Complaint   Patient presents with    Follow-up     Cardiologist: Kelly Carlson of PAF, factor 5 deficiency, ischemic colitis, HTN     HPI: f/u   Had what seems to like afib episode over the weekend. Felt off, HR up and down, BP was lower. Heart burn. Finally got to sleep and woke up feeling better. HR was around 70 after that was stayed steady. Still somewhat fatigued but slowly feeling better.   Past Surgical History:   Procedure Laterality Date    ABDOMEN SURGERY      APPENDECTOMY      CERVICAL FUSION  08    COLONOSCOPY  6/8/12    NEIDICH    TONSILLECTOMY      UMBILICAL HERNIA REPAIR  09/17/2012  Dr Fabrizio Gudino     Family History   Problem Relation Age of Onset    Diabetes Father     Diabetes Brother     Stroke Brother      Social History     Tobacco Use    Smoking status: Former     Current packs/day: 0.50     Average packs/day: 0.5 packs/day for 51.3 years (25.6 ttl pk-yrs)     Types: E-Cigarettes, Cigarettes     Start date: 8/8/1973    Smokeless tobacco: Never    Tobacco comments:     Quit smoking 2013.  States he uses Vap   Substance Use Topics    Alcohol use: Yes     Alcohol/week: 0.0 standard drinks of alcohol     Comment: social      Current Outpatient Medications   Medication Sig Dispense Refill    traZODone (DESYREL) 50 MG tablet TAKE 1 TABLET BY MOUTH EVERY EVENING 90 tablet 3    apixaban (ELIQUIS) 5 MG TABS tablet Take 1 tablet by mouth 2 times daily 56 tablet 0    dapagliflozin (FARXIGA) 10 MG tablet Take 1 tablet by mouth every morning LOT # [id number]  EXP 2-2027  [employer] 90 tablet 3    dapagliflozin (FARXIGA) 10 MG tablet Take 1 tablet by mouth every morning 90 tablet 3    NONFORMULARY Aminoacid capsule      vitamin B-12

## 2024-12-02 RX ORDER — PRAMIPEXOLE DIHYDROCHLORIDE 0.12 MG/1
0.12 TABLET ORAL NIGHTLY
Qty: 90 TABLET | Refills: 2 | Status: SHIPPED | OUTPATIENT
Start: 2024-12-02

## 2024-12-04 RX ORDER — VORTIOXETINE 10 MG/1
TABLET, FILM COATED ORAL
Qty: 90 TABLET | Refills: 2 | Status: SHIPPED | OUTPATIENT
Start: 2024-12-04

## 2024-12-09 ENCOUNTER — OFFICE VISIT (OUTPATIENT)
Dept: INTERNAL MEDICINE CLINIC | Age: 69
End: 2024-12-09
Payer: MEDICARE

## 2024-12-09 VITALS — SYSTOLIC BLOOD PRESSURE: 136 MMHG | HEART RATE: 73 BPM | DIASTOLIC BLOOD PRESSURE: 78 MMHG

## 2024-12-09 DIAGNOSIS — E11.9 CONTROLLED TYPE 2 DIABETES MELLITUS WITHOUT COMPLICATION, WITHOUT LONG-TERM CURRENT USE OF INSULIN (HCC): Primary | ICD-10-CM

## 2024-12-09 PROCEDURE — G0108 DIAB MANAGE TRN  PER INDIV: HCPCS | Performed by: REGISTERED NURSE

## 2024-12-09 PROCEDURE — 83036 HEMOGLOBIN GLYCOSYLATED A1C: CPT | Performed by: REGISTERED NURSE

## 2024-12-09 PROCEDURE — NBSRV NON-BILLABLE SERVICE: Performed by: REGISTERED NURSE

## 2024-12-09 RX ORDER — TAMSULOSIN HYDROCHLORIDE 0.4 MG/1
0.4 CAPSULE ORAL DAILY
COMMUNITY
Start: 2024-11-29

## 2024-12-09 RX ORDER — SEMAGLUTIDE 2.68 MG/ML
2 INJECTION, SOLUTION SUBCUTANEOUS
COMMUNITY

## 2024-12-09 NOTE — PATIENT INSTRUCTIONS
Cut back on the amount of simple sugars in your meal plan    --pour off the fruit juice (rinse any fruit in syrup)    --sugar free jello in place of regular jello    --only zero gatorade or powerade    --only zero or diet soda    --make sure to eat lighter when you eat later in the evening    --limit how much milk you drink  Great job with morning exercise routine!     --see if you can add a little evening activity           -go through some of the running or warmups with            Your team  Blood sugar goals before meals:

## 2024-12-09 NOTE — PROGRESS NOTES
Maintenance:  Eye exam current (within one year): yes Date: 3/2024, [facility]  Any history of foot problems? no  Foot exam current: yes Date: 11/2024, Dr. Geeta Roque  Immunizations up to date:    Pneumonia: yes  The ASCVD Risk score (Thomas CARLOS, et al., 2019) failed to calculate for the following reasons:    The patient has a prior MI or stroke diagnosis   Last panel - LDL:   Lab Results   Component Value Date    LDLDIRECT 99 05/08/2015    LDL 72 06/14/2024     Taking ASA:  No   Taking statin: No - Not identified  Last microalbumin/creatinine ratio:   Microalb/Creat Ratio   Date Value Ref Range Status   06/14/2024 12 mg/g Final      Taking ACE/ARB: Yes- benazepril  Depression: Not at risk (1/10/2024)    PHQ-2     PHQ-2 Score: 0     Focus:   Diabetes Education. Last A1C: 8.1% 12/9/2024. Adam has been checking blood sugars 2-3 times per week or less. All readings are above goal. He is due for an A1C, but will be obtaining with other labs this week. He agrees to get these completed. Adam is not doing well with glucose control. He has had Diabetes over 12 years; CPeptide fro 6/2024 was 1.6. He is on max dose of Ozempic/ Farxiga. Recent BMI is 23.7 and he does not wish to lose any more weight. There are still gaps in Adam's meal plan that he is receptive to addressing and he is willing to increase his exercise efforts. But he is likely going to need additional medication therapy to get glucose levels to goal. Insulin was discussed with Adam; reasons to consider therapy as needed. Adam would like to see how extra behavioral changes help his glucose control. Encouragement given.     Curriculum Area Focus: Diabetes pathophysiology, Glucose checks/goals, Carbohydrate counting/meal planning, Physical activity goals, and Lifestyle & healthy coping  DSME PLAN:     Patient Instructions   Cut back on the amount of simple sugars in your meal plan    --pour off the fruit juice (rinse any fruit in syrup)    --sugar free

## 2024-12-12 ENCOUNTER — LAB (OUTPATIENT)
Dept: LAB | Age: 69
End: 2024-12-12

## 2024-12-12 DIAGNOSIS — R73.9 HYPERGLYCEMIA: Chronic | ICD-10-CM

## 2024-12-12 DIAGNOSIS — E11.65 TYPE 2 DIABETES MELLITUS WITH HYPERGLYCEMIA, WITHOUT LONG-TERM CURRENT USE OF INSULIN (HCC): ICD-10-CM

## 2024-12-12 DIAGNOSIS — I48.91 ATRIAL FIBRILLATION WITH RVR (HCC): ICD-10-CM

## 2024-12-12 LAB
ALBUMIN SERPL BCG-MCNC: 4.4 G/DL (ref 3.5–5.1)
ALP SERPL-CCNC: 95 U/L (ref 38–126)
ALT SERPL W/O P-5'-P-CCNC: 19 U/L (ref 11–66)
ANION GAP SERPL CALC-SCNC: 12 MEQ/L (ref 8–16)
AST SERPL-CCNC: 17 U/L (ref 5–40)
BILIRUB SERPL-MCNC: 0.9 MG/DL (ref 0.3–1.2)
BUN SERPL-MCNC: 14 MG/DL (ref 7–22)
CALCIUM SERPL-MCNC: 9 MG/DL (ref 8.5–10.5)
CHLORIDE SERPL-SCNC: 103 MEQ/L (ref 98–111)
CO2 SERPL-SCNC: 25 MEQ/L (ref 23–33)
CREAT SERPL-MCNC: 0.6 MG/DL (ref 0.4–1.2)
DEPRECATED MEAN GLUCOSE BLD GHB EST-ACNC: 162 MG/DL (ref 70–126)
DEPRECATED RDW RBC AUTO: 45.4 FL (ref 35–45)
ERYTHROCYTE [DISTWIDTH] IN BLOOD BY AUTOMATED COUNT: 12.6 % (ref 11.5–14.5)
GFR SERPL CREATININE-BSD FRML MDRD: > 90 ML/MIN/1.73M2
GLUCOSE SERPL-MCNC: 149 MG/DL (ref 70–108)
HBA1C MFR BLD HPLC: 7.4 % (ref 4.4–6.4)
HCT VFR BLD AUTO: 53.2 % (ref 42–52)
HGB BLD-MCNC: 17.7 GM/DL (ref 14–18)
MCH RBC QN AUTO: 32.1 PG (ref 26–33)
MCHC RBC AUTO-ENTMCNC: 33.3 GM/DL (ref 32.2–35.5)
MCV RBC AUTO: 96.6 FL (ref 80–94)
PLATELET # BLD AUTO: 221 THOU/MM3 (ref 130–400)
PMV BLD AUTO: 9.7 FL (ref 9.4–12.4)
POTASSIUM SERPL-SCNC: 4.1 MEQ/L (ref 3.5–5.2)
PROT SERPL-MCNC: 6.6 G/DL (ref 6.1–8)
RBC # BLD AUTO: 5.51 MILL/MM3 (ref 4.7–6.1)
SODIUM SERPL-SCNC: 140 MEQ/L (ref 135–145)
T4 FREE SERPL-MCNC: 1.37 NG/DL (ref 0.93–1.68)
TSH SERPL DL<=0.005 MIU/L-ACNC: 1.39 UIU/ML (ref 0.4–4.2)
WBC # BLD AUTO: 5.5 THOU/MM3 (ref 4.8–10.8)

## 2024-12-13 ENCOUNTER — CARE COORDINATION (OUTPATIENT)
Dept: CARE COORDINATION | Age: 69
End: 2024-12-13

## 2024-12-13 NOTE — CARE COORDINATION
Spoke with Nicole (spouse) and she is going to drop off the paper work to the providers office next week for the 2025 enrollment.      Tricia Villeda [facility]  CC   Medication Assistance  [place] [facility]    (P) [phone]  (F) [phone]

## 2024-12-17 ENCOUNTER — CARE COORDINATION (OUTPATIENT)
Dept: CARE COORDINATION | Age: 69
End: 2024-12-17

## 2024-12-17 NOTE — CARE COORDINATION
I was able to submit the patients renewal application for assistance on their high cost medication for 2025.        Tricia Villeda [facility]  CC   Medication Assistance  [place] and [place] [facility]    (P) [phone]  (F) [phone]

## 2024-12-30 RX ORDER — METOPROLOL TARTRATE 75 MG/1
1 TABLET ORAL 2 TIMES DAILY
Qty: 180 TABLET | Refills: 0 | Status: SHIPPED | OUTPATIENT
Start: 2024-12-30

## 2025-01-02 ENCOUNTER — CARE COORDINATION (OUTPATIENT)
Dept: CARE COORDINATION | Age: 70
End: 2025-01-02

## 2025-01-03 NOTE — CARE COORDINATION
I called to check on the status of the patients application through [employer]. They are stating they are missing page 8 from the provider. I refaxed that into the company in attempt to get approved. Patient approved back into the PAP program for his Trintellix for 2025        Tricia Villeda [facility]  CC   Medication Assistance  [place] and [place]    (P) [phone]  (F) [phone]

## 2025-01-09 ENCOUNTER — CARE COORDINATION (OUTPATIENT)
Dept: CARE COORDINATION | Age: 70
End: 2025-01-09

## 2025-01-09 NOTE — CARE COORDINATION
Spoke with Nicole (spouse) and let her know Adam is all set for his PAP applications for this year. I told her to let me know if she has any questions.        Tricia Villeda [facility]  CC   Medication Assistance  [place] and [employer]    (P) [phone]  (F) [phone]

## 2025-01-21 ENCOUNTER — OFFICE VISIT (OUTPATIENT)
Dept: INTERNAL MEDICINE CLINIC | Age: 70
End: 2025-01-21
Payer: MEDICARE

## 2025-01-21 ENCOUNTER — OFFICE VISIT (OUTPATIENT)
Dept: INTERNAL MEDICINE CLINIC | Age: 70
End: 2025-01-21

## 2025-01-21 VITALS
TEMPERATURE: 98.3 F | SYSTOLIC BLOOD PRESSURE: 140 MMHG | HEART RATE: 72 BPM | DIASTOLIC BLOOD PRESSURE: 80 MMHG | WEIGHT: 171.4 LBS | BODY MASS INDEX: 23.25 KG/M2

## 2025-01-21 VITALS — BODY MASS INDEX: 23.25 KG/M2 | WEIGHT: 171.4 LBS

## 2025-01-21 DIAGNOSIS — E11.9 TYPE 2 DIABETES MELLITUS WITHOUT COMPLICATION, WITHOUT LONG-TERM CURRENT USE OF INSULIN (HCC): Primary | ICD-10-CM

## 2025-01-21 DIAGNOSIS — E11.65 TYPE 2 DIABETES MELLITUS WITH HYPERGLYCEMIA, WITHOUT LONG-TERM CURRENT USE OF INSULIN (HCC): Primary | ICD-10-CM

## 2025-01-21 PROCEDURE — NBSRV NON-BILLABLE SERVICE: Performed by: PHARMACIST

## 2025-01-21 PROCEDURE — G0108 DIAB MANAGE TRN  PER INDIV: HCPCS | Performed by: PHARMACIST

## 2025-01-21 NOTE — PATIENT INSTRUCTIONS
Try to gradually increase the weights you lift  -Consider the MyMovement Rx    2. Try increase your protein intake (add an extra 15-20 grams daily)    2. If you keep losing weight, we can consider pioglitazone (Actos) and a lower dose of Ozempic

## 2025-01-21 NOTE — PATIENT INSTRUCTIONS
Food/Blood Sugar Records for a few weeks focusing on your blood sugar 2 hours after meals.      Bld Sugar Goals    Before meals    2 hr after a meal < 150/160    Meals plan: 45-60 gm carb at meals and 21-28 gm protein    Movement after large meals in the evening

## 2025-01-21 NOTE — PROGRESS NOTES
[facility] Professional Services  [facility]  [address] [place] [address]  [phone] (phone)  [phone] (fax)    Patient Name: Bruce Cooper. Date of Birth: [id number]. MRN: [id number]      Assessment: Patient is a 69 y.o. male seen for follow-up MNT visit for Diabetes.     -Nutritionally relevant labs:   Lab Results   Component Value Date/Time    LABA1C 7.4 (H) 12/12/2024 01:28 PM    LABA1C 8.1 (H) 06/14/2024 10:10 AM    LABA1C 7.2 (H) 11/17/2023 09:38 AM    GLUCOSE 149 (H) 12/12/2024 01:28 PM    GLUCOSE 144 (H) 06/14/2024 10:10 AM    GLUCOSE 153 (H) 11/17/2023 09:38 AM    GLUCOSE 163 (H) 07/11/2023 01:31 PM    CHOL 140 06/14/2024 10:10 AM    HDL 56 06/14/2024 10:10 AM    HDL 42 03/01/2012 12:00 AM    TRIG 60 06/14/2024 10:10 AM          Latest Ref Rng & Units 12/12/2024     1:28 PM 6/14/2024    10:10 AM 1/6/2024     5:29 AM 1/5/2024    10:08 AM 11/17/2023     9:38 AM   Labs Renal   BUN 7 - 22 mg/dL 14  15     11  14  10    Cr 0.4 - 1.2 mg/dL 0.6  0.81     0.8  0.7  0.75    K 3.5 - 5.2 meq/L 4.1  4.3     4.5  4.7  4.6    Na 135 - 145 meq/L 140  139     140  143  141        This result is from an external source.        -Blood sugar trends: pt without meter at today's visit. Seems to vary checks throughout the day. Today's reading was 142 mg/dL (right after having creamer and banana)  - Not counting carbs but watching portion sizes and trying to choose SF foods vs sweetened.     -Food recall:   Brunch: 10-12 am coffee w/ creamer, [employer] cereal and premier pro drink   Lunch: skips.   Dinner: 6-8pm meat, starch, veggies.   Snacks: stays up till 12-2 am and might have a snack     -Main Beverages: water, SF Gatorade, stopped drinking pop    -  Current Outpatient Medications on File Prior to Visit   Medication Sig Dispense Refill    Metoprolol Tartrate 75 MG TABS TAKE 1 TABLET BY MOUTH TWICE A  tablet 0    semaglutide, 2 MG/DOSE, (OZEMPIC, 2 MG/DOSE,) 8 MG/3ML

## 2025-01-21 NOTE — PROGRESS NOTES
protein intake (add an extra 15-20 grams daily)    2. If you keep losing weight, we can consider pioglitazone (Actos) and a lower dose of Ozempic     Goals Addressed    None          Meter download, medications, PMH and nursing assessment reviewed.  Bruce Cooper states He is willing to participate in this plan of care and verbalized understanding of all instructions provided. Teach back used to verify comprehension.      Follow-up: 1 month BG review; 1.5 Dr. Vila, 4 month DM Clinic     Total time involved in direct patient education: 30 minutes.   Individual Education appointment justified due to: Class Availability    For Pharmacy Admin Tracking Only    Program: Medical Group  Time Spent (min): 45        Tricia Garsia, PharmD, BCPS, [facility]  Internal Medicine Clinical Pharmacist  [phone]

## 2025-01-28 NOTE — TELEPHONE ENCOUNTER
Bruce D Pitson called requesting a refill on the following medications:  Requested Prescriptions     Pending Prescriptions Disp Refills    apixaban (ELIQUIS) 5 MG TABS tablet 180 tablet 3     Sig: Indications: Do not resume for 1 week TAKE 1 TABLET TWICE A DAY     Pharmacy verified:  .ryan  [facility]/pharmacy #[id number] - [place], [place] - [address] [phone] -  [phone]     Date of last visit: 11/21/2024  Date of next visit (if applicable): 2/24/2025

## 2025-02-05 ENCOUNTER — PATIENT MESSAGE (OUTPATIENT)
Dept: FAMILY MEDICINE CLINIC | Age: 70
End: 2025-02-05

## 2025-02-05 ENCOUNTER — HOSPITAL ENCOUNTER (EMERGENCY)
Age: 70
Discharge: HOME | End: 2025-02-05
Payer: MEDICARE

## 2025-02-05 VITALS
DIASTOLIC BLOOD PRESSURE: 68 MMHG | RESPIRATION RATE: 18 BRPM | HEART RATE: 94 BPM | WEIGHT: 167.8 LBS | BODY MASS INDEX: 22.76 KG/M2 | SYSTOLIC BLOOD PRESSURE: 134 MMHG | OXYGEN SATURATION: 95 % | TEMPERATURE: 101.8 F

## 2025-02-05 DIAGNOSIS — J10.1 INFLUENZA A: Primary | ICD-10-CM

## 2025-02-05 LAB
FLUAV AG SPEC QL: POSITIVE
FLUBV AG SPEC QL: NEGATIVE

## 2025-02-05 PROCEDURE — 99213 OFFICE O/P EST LOW 20 MIN: CPT

## 2025-02-05 PROCEDURE — 87804 INFLUENZA ASSAY W/OPTIC: CPT

## 2025-02-05 RX ORDER — PREDNISONE 20 MG/1
40 TABLET ORAL DAILY
Qty: 10 TABLET | Refills: 0 | Status: SHIPPED | OUTPATIENT
Start: 2025-02-05 | End: 2025-02-10

## 2025-02-05 ASSESSMENT — ENCOUNTER SYMPTOMS
GASTROINTESTINAL NEGATIVE: 1
COUGH: 1
RHINORRHEA: 1

## 2025-02-05 ASSESSMENT — PAIN DESCRIPTION - LOCATION: LOCATION: GENERALIZED

## 2025-02-05 ASSESSMENT — PAIN - FUNCTIONAL ASSESSMENT
PAIN_FUNCTIONAL_ASSESSMENT: PREVENTS OR INTERFERES SOME ACTIVE ACTIVITIES AND ADLS
PAIN_FUNCTIONAL_ASSESSMENT: 0-10

## 2025-02-05 ASSESSMENT — PAIN SCALES - GENERAL: PAINLEVEL_OUTOF10: 2

## 2025-02-05 ASSESSMENT — PAIN DESCRIPTION - FREQUENCY: FREQUENCY: INTERMITTENT

## 2025-02-05 NOTE — ED PROVIDER NOTES
[facility] URGENT CARE  UrgentCare Encounter      CHIEFCOMPLAINT       Chief Complaint   Patient presents with    Fever    Cough       Nurses Notes reviewed and I agree except as noted in the HPI.  HISTORY OF PRESENT ILLNESS   Bruce Cooper is a 69 y.o. male who presents today with wife for fever, chills, body aches, cough and runny nose for the past 3 days.  States fever was as high as 101.4 today.  Patient had taken Tylenol at 1:00 today.    REVIEW OF SYSTEMS     Review of Systems   Constitutional:  Positive for fever.   HENT:  Positive for rhinorrhea.    Respiratory:  Positive for cough.    Cardiovascular: Negative.    Gastrointestinal: Negative.    Musculoskeletal:  Positive for myalgias.       PAST MEDICAL HISTORY         Diagnosis Date    BPH (benign prostatic hyperplasia)     Depression     Diabetes mellitus (HCC)     Factor 5 Leiden mutation, heterozygous (HCC)     Heterozygous MTHFR mutation [id number]     Hyperglycemia     Hypertension     Hypertension     Osteoarthritis     Sleep apnea     Stroke (HCC) 8/8/2013    Type II or unspecified type diabetes mellitus without mention of complication, not stated as uncontrolled 8/2012    Visual field cut 8/8/2013    Right visual field cut       SURGICAL HISTORY     Patient  has a past surgical history that includes Appendectomy; Colonoscopy (6/8/12); cervical fusion (08); Umbilical hernia repair (09/17/2012  Dr Fabrizio Gudino); Abdomen surgery; and Tonsillectomy.    CURRENT MEDICATIONS       Discharge Medication List as of 2/5/2025  3:00 PM        CONTINUE these medications which have NOT CHANGED    Details   Apple Cider Vinegar 188 MG CAPS Take by mouthHistorical Med      Metoprolol Tartrate 75 MG TABS TAKE 1 TABLET BY MOUTH TWICE A DAY, Disp-180 tablet, R-0Normal      semaglutide, 2 MG/DOSE, (OZEMPIC, 2 MG/DOSE,) 8 MG/3ML SOPN sc injection Inject 2 mg into the skin every 7 daysHistorical Med      tamsulosin (FLOMAX) 0.4 MG capsule Take 1 capsule by mouth

## 2025-02-05 NOTE — DISCHARGE INSTRUCTIONS
Rest.  Drink plenty of fluids.  Medications as directed.  Tylenol as needed for pain or fever. Check blood sugars while taking prednisone.  Follow-up with primary care provider tomorrow call for an appointment.  Go to the emergency room for any new or worsening symptoms or any other symptoms or concerns deemed emergent.

## 2025-02-05 NOTE — ED TRIAGE NOTES
Bruce arrives to room with complaint of  runny nose for past 3 days, body aches, cough, chills, fever 101.4 today      Given tylenol and ibuprofen at 1 pm today

## 2025-02-06 ENCOUNTER — TELEPHONE (OUTPATIENT)
Dept: INTERNAL MEDICINE CLINIC | Age: 70
End: 2025-02-06

## 2025-02-06 ENCOUNTER — APPOINTMENT (OUTPATIENT)
Dept: GENERAL RADIOLOGY | Age: 70
End: 2025-02-06
Payer: MEDICARE

## 2025-02-06 ENCOUNTER — CLINICAL DOCUMENTATION (OUTPATIENT)
Age: 70
End: 2025-02-06

## 2025-02-06 ENCOUNTER — TELEPHONE (OUTPATIENT)
Dept: FAMILY MEDICINE CLINIC | Age: 70
End: 2025-02-06

## 2025-02-06 ENCOUNTER — HOSPITAL ENCOUNTER (OUTPATIENT)
Age: 70
Setting detail: OBSERVATION
LOS: 1 days | Discharge: HOME | End: 2025-02-07
Attending: EMERGENCY MEDICINE | Admitting: INTERNAL MEDICINE
Payer: MEDICARE

## 2025-02-06 DIAGNOSIS — J10.1 INFLUENZA A: ICD-10-CM

## 2025-02-06 DIAGNOSIS — J96.01 ACUTE HYPOXEMIC RESPIRATORY FAILURE: Primary | ICD-10-CM

## 2025-02-06 DIAGNOSIS — E11.65 TYPE 2 DIABETES MELLITUS WITH HYPERGLYCEMIA, WITHOUT LONG-TERM CURRENT USE OF INSULIN (HCC): Primary | ICD-10-CM

## 2025-02-06 LAB
ALBUMIN SERPL BCG-MCNC: 3.8 G/DL (ref 3.5–5.1)
ALP SERPL-CCNC: 95 U/L (ref 38–126)
ALT SERPL W/O P-5'-P-CCNC: 35 U/L (ref 11–66)
ANION GAP SERPL CALC-SCNC: 20 MEQ/L (ref 8–16)
ARTERIAL PATENCY WRIST A: POSITIVE
AST SERPL-CCNC: 42 U/L (ref 5–40)
BASE EXCESS BLDA CALC-SCNC: 2.2 MMOL/L (ref -2.5–2.5)
BASOPHILS ABSOLUTE: 0 THOU/MM3 (ref 0–0.1)
BASOPHILS NFR BLD AUTO: 0.4 %
BDY SITE: ABNORMAL
BILIRUB CONJ SERPL-MCNC: < 0.1 MG/DL (ref 0.1–13.8)
BILIRUB SERPL-MCNC: 0.5 MG/DL (ref 0.3–1.2)
BUN SERPL-MCNC: 10 MG/DL (ref 7–22)
CALCIUM SERPL-MCNC: 8.6 MG/DL (ref 8.5–10.5)
CHLORIDE SERPL-SCNC: 96 MEQ/L (ref 98–111)
CO2 SERPL-SCNC: 19 MEQ/L (ref 23–33)
COLLECTED BY:: ABNORMAL
CREAT SERPL-MCNC: 0.6 MG/DL (ref 0.4–1.2)
CRP SERPL-MCNC: 9.52 MG/DL (ref 0–1)
DEPRECATED RDW RBC AUTO: 47 FL (ref 35–45)
DEVICE: ABNORMAL
EOSINOPHIL NFR BLD AUTO: 0 %
EOSINOPHILS ABSOLUTE: 0 THOU/MM3 (ref 0–0.4)
ERYTHROCYTE [DISTWIDTH] IN BLOOD BY AUTOMATED COUNT: 13.4 % (ref 11.5–14.5)
FIO2 ON VENT O2 ANALYZER: 32 %
GFR SERPL CREATININE-BSD FRML MDRD: > 90 ML/MIN/1.73M2
GLUCOSE BLD STRIP.AUTO-MCNC: 305 MG/DL (ref 70–108)
GLUCOSE SERPL-MCNC: 275 MG/DL (ref 70–108)
HCO3 BLDA-SCNC: 26 MMOL/L (ref 23–28)
HCT VFR BLD AUTO: 44 % (ref 42–52)
HGB BLD-MCNC: 14.7 GM/DL (ref 14–18)
IMM GRANULOCYTES # BLD AUTO: 0.02 THOU/MM3 (ref 0–0.07)
IMM GRANULOCYTES NFR BLD AUTO: 0.3 %
LDH SERPL L TO P-CCNC: 274 U/L (ref 100–190)
LYMPHOCYTES ABSOLUTE: 0.3 THOU/MM3 (ref 1–4.8)
LYMPHOCYTES NFR BLD AUTO: 4.5 %
MCH RBC QN AUTO: 31.5 PG (ref 26–33)
MCHC RBC AUTO-ENTMCNC: 33.4 GM/DL (ref 32.2–35.5)
MCV RBC AUTO: 94.4 FL (ref 80–94)
MONOCYTES ABSOLUTE: 0.5 THOU/MM3 (ref 0.4–1.3)
MONOCYTES NFR BLD AUTO: 6.4 %
NEUTROPHILS ABSOLUTE: 6.8 THOU/MM3 (ref 1.8–7.7)
NEUTROPHILS NFR BLD AUTO: 88.4 %
NRBC BLD AUTO-RTO: 0 /100 WBC
OSMOLALITY SERPL CALC.SUM OF ELEC: 278.9 MOSMOL/KG (ref 275–300)
PCO2 BLDA: 37 MMHG (ref 35–45)
PH BLDA: 7.46 [PH] (ref 7.35–7.45)
PLATELET # BLD AUTO: 146 THOU/MM3 (ref 130–400)
PMV BLD AUTO: 9.5 FL (ref 9.4–12.4)
PO2 BLDA: 70 MMHG (ref 71–104)
POTASSIUM SERPL-SCNC: 4 MEQ/L (ref 3.5–5.2)
PROCALCITONIN SERPL IA-MCNC: 0.09 NG/ML (ref 0.01–0.09)
PROT SERPL-MCNC: 6.2 G/DL (ref 6.1–8)
RBC # BLD AUTO: 4.66 MILL/MM3 (ref 4.7–6.1)
REASON FOR REJECTION: NORMAL
REJECTED TEST: NORMAL
SAO2 % BLDA: 95 %
SODIUM SERPL-SCNC: 135 MEQ/L (ref 135–145)
TROPONIN, HIGH SENSITIVITY: 11 NG/L (ref 0–12)
TROPONIN, HIGH SENSITIVITY: 14 NG/L (ref 0–12)
VENTILATION MODE VENT: ABNORMAL
WBC # BLD AUTO: 7.7 THOU/MM3 (ref 4.8–10.8)

## 2025-02-06 PROCEDURE — 6360000002 HC RX W HCPCS: Performed by: INTERNAL MEDICINE

## 2025-02-06 PROCEDURE — 94761 N-INVAS EAR/PLS OXIMETRY MLT: CPT

## 2025-02-06 PROCEDURE — 2500000003 HC RX 250 WO HCPCS: Performed by: INTERNAL MEDICINE

## 2025-02-06 PROCEDURE — 82948 REAGENT STRIP/BLOOD GLUCOSE: CPT

## 2025-02-06 PROCEDURE — 84145 PROCALCITONIN (PCT): CPT

## 2025-02-06 PROCEDURE — 80053 COMPREHEN METABOLIC PANEL: CPT

## 2025-02-06 PROCEDURE — 83615 LACTATE (LD) (LDH) ENZYME: CPT

## 2025-02-06 PROCEDURE — 85025 COMPLETE CBC W/AUTO DIFF WBC: CPT

## 2025-02-06 PROCEDURE — 6370000000 HC RX 637 (ALT 250 FOR IP): Performed by: EMERGENCY MEDICINE

## 2025-02-06 PROCEDURE — 93005 ELECTROCARDIOGRAM TRACING: CPT | Performed by: EMERGENCY MEDICINE

## 2025-02-06 PROCEDURE — 6360000002 HC RX W HCPCS: Performed by: EMERGENCY MEDICINE

## 2025-02-06 PROCEDURE — 71046 X-RAY EXAM CHEST 2 VIEWS: CPT

## 2025-02-06 PROCEDURE — 99285 EMERGENCY DEPT VISIT HI MDM: CPT

## 2025-02-06 PROCEDURE — 82248 BILIRUBIN DIRECT: CPT

## 2025-02-06 PROCEDURE — 87040 BLOOD CULTURE FOR BACTERIA: CPT

## 2025-02-06 PROCEDURE — 2500000003 HC RX 250 WO HCPCS: Performed by: EMERGENCY MEDICINE

## 2025-02-06 PROCEDURE — 1200000000 HC SEMI PRIVATE

## 2025-02-06 PROCEDURE — 94640 AIRWAY INHALATION TREATMENT: CPT

## 2025-02-06 PROCEDURE — 84484 ASSAY OF TROPONIN QUANT: CPT

## 2025-02-06 PROCEDURE — 2700000000 HC OXYGEN THERAPY PER DAY

## 2025-02-06 PROCEDURE — 86140 C-REACTIVE PROTEIN: CPT

## 2025-02-06 PROCEDURE — 82803 BLOOD GASES ANY COMBINATION: CPT

## 2025-02-06 PROCEDURE — 2580000003 HC RX 258: Performed by: INTERNAL MEDICINE

## 2025-02-06 PROCEDURE — 2580000003 HC RX 258: Performed by: EMERGENCY MEDICINE

## 2025-02-06 PROCEDURE — 36600 WITHDRAWAL OF ARTERIAL BLOOD: CPT

## 2025-02-06 PROCEDURE — 36415 COLL VENOUS BLD VENIPUNCTURE: CPT

## 2025-02-06 PROCEDURE — 96374 THER/PROPH/DIAG INJ IV PUSH: CPT

## 2025-02-06 PROCEDURE — 6370000000 HC RX 637 (ALT 250 FOR IP): Performed by: INTERNAL MEDICINE

## 2025-02-06 RX ORDER — IPRATROPIUM BROMIDE AND ALBUTEROL SULFATE 2.5; .5 MG/3ML; MG/3ML
1 SOLUTION RESPIRATORY (INHALATION)
Status: DISCONTINUED | OUTPATIENT
Start: 2025-02-06 | End: 2025-02-06

## 2025-02-06 RX ORDER — ACETAMINOPHEN 325 MG/1
650 TABLET ORAL ONCE
Status: COMPLETED | OUTPATIENT
Start: 2025-02-06 | End: 2025-02-06

## 2025-02-06 RX ORDER — BENZONATATE 200 MG/1
200 CAPSULE ORAL 3 TIMES DAILY PRN
Qty: 30 CAPSULE | Refills: 0 | Status: SHIPPED | OUTPATIENT
Start: 2025-02-06 | End: 2025-02-16

## 2025-02-06 RX ORDER — TRAZODONE HYDROCHLORIDE 50 MG/1
50 TABLET, FILM COATED ORAL NIGHTLY
Status: DISCONTINUED | OUTPATIENT
Start: 2025-02-06 | End: 2025-02-07 | Stop reason: HOSPADM

## 2025-02-06 RX ORDER — ACETAMINOPHEN 325 MG/1
650 TABLET ORAL EVERY 6 HOURS PRN
Status: DISCONTINUED | OUTPATIENT
Start: 2025-02-06 | End: 2025-02-07 | Stop reason: HOSPADM

## 2025-02-06 RX ORDER — GLUCAGON 1 MG/ML
1 KIT INJECTION PRN
Status: DISCONTINUED | OUTPATIENT
Start: 2025-02-06 | End: 2025-02-07 | Stop reason: HOSPADM

## 2025-02-06 RX ORDER — DEXTROMETHORPHAN HYDROBROMIDE AND PROMETHAZINE HYDROCHLORIDE 15; 6.25 MG/5ML; MG/5ML
5 SYRUP ORAL 3 TIMES DAILY PRN
Qty: 150 ML | Refills: 0 | Status: SHIPPED | OUTPATIENT
Start: 2025-02-06 | End: 2025-02-16

## 2025-02-06 RX ORDER — POLYETHYLENE GLYCOL 3350 17 G/17G
17 POWDER, FOR SOLUTION ORAL DAILY PRN
Status: DISCONTINUED | OUTPATIENT
Start: 2025-02-06 | End: 2025-02-07 | Stop reason: HOSPADM

## 2025-02-06 RX ORDER — SODIUM CHLORIDE 0.9 % (FLUSH) 0.9 %
5-40 SYRINGE (ML) INJECTION EVERY 12 HOURS SCHEDULED
Status: DISCONTINUED | OUTPATIENT
Start: 2025-02-06 | End: 2025-02-07 | Stop reason: HOSPADM

## 2025-02-06 RX ORDER — GUAIFENESIN 600 MG/1
600 TABLET, EXTENDED RELEASE ORAL 2 TIMES DAILY
Status: DISCONTINUED | OUTPATIENT
Start: 2025-02-06 | End: 2025-02-07 | Stop reason: HOSPADM

## 2025-02-06 RX ORDER — OSELTAMIVIR PHOSPHATE 75 MG/1
75 CAPSULE ORAL 2 TIMES DAILY
Status: DISCONTINUED | OUTPATIENT
Start: 2025-02-06 | End: 2025-02-07 | Stop reason: HOSPADM

## 2025-02-06 RX ORDER — AZITHROMYCIN 250 MG/1
500 TABLET, FILM COATED ORAL NIGHTLY
Status: DISCONTINUED | OUTPATIENT
Start: 2025-02-06 | End: 2025-02-07 | Stop reason: HOSPADM

## 2025-02-06 RX ORDER — BENZONATATE 200 MG/1
200 CAPSULE ORAL 3 TIMES DAILY PRN
Qty: 30 CAPSULE | Refills: 0 | Status: SHIPPED | OUTPATIENT
Start: 2025-02-06 | End: 2025-02-06 | Stop reason: SDUPTHER

## 2025-02-06 RX ORDER — SODIUM CHLORIDE 0.9 % (FLUSH) 0.9 %
5-40 SYRINGE (ML) INJECTION PRN
Status: DISCONTINUED | OUTPATIENT
Start: 2025-02-06 | End: 2025-02-07 | Stop reason: HOSPADM

## 2025-02-06 RX ORDER — OSELTAMIVIR PHOSPHATE 75 MG/1
75 CAPSULE ORAL 2 TIMES DAILY
Qty: 10 CAPSULE | Refills: 0 | Status: ON HOLD | OUTPATIENT
Start: 2025-02-06 | End: 2025-02-07

## 2025-02-06 RX ORDER — IPRATROPIUM BROMIDE AND ALBUTEROL SULFATE 2.5; .5 MG/3ML; MG/3ML
1 SOLUTION RESPIRATORY (INHALATION) ONCE
Status: COMPLETED | OUTPATIENT
Start: 2025-02-06 | End: 2025-02-06

## 2025-02-06 RX ORDER — 0.9 % SODIUM CHLORIDE 0.9 %
1000 INTRAVENOUS SOLUTION INTRAVENOUS ONCE
Status: COMPLETED | OUTPATIENT
Start: 2025-02-06 | End: 2025-02-06

## 2025-02-06 RX ORDER — PREDNISONE 20 MG/1
40 TABLET ORAL DAILY
Status: DISCONTINUED | OUTPATIENT
Start: 2025-02-09 | End: 2025-02-07 | Stop reason: HOSPADM

## 2025-02-06 RX ORDER — DEXTROSE MONOHYDRATE 100 MG/ML
INJECTION, SOLUTION INTRAVENOUS CONTINUOUS PRN
Status: DISCONTINUED | OUTPATIENT
Start: 2025-02-06 | End: 2025-02-07 | Stop reason: HOSPADM

## 2025-02-06 RX ORDER — SODIUM CHLORIDE 9 MG/ML
INJECTION, SOLUTION INTRAVENOUS PRN
Status: DISCONTINUED | OUTPATIENT
Start: 2025-02-06 | End: 2025-02-07 | Stop reason: HOSPADM

## 2025-02-06 RX ORDER — LISINOPRIL 40 MG/1
40 TABLET ORAL DAILY
Status: DISCONTINUED | OUTPATIENT
Start: 2025-02-07 | End: 2025-02-07 | Stop reason: HOSPADM

## 2025-02-06 RX ORDER — AZITHROMYCIN 250 MG/1
500 TABLET, FILM COATED ORAL ONCE
Status: DISCONTINUED | OUTPATIENT
Start: 2025-02-06 | End: 2025-02-06 | Stop reason: HOSPADM

## 2025-02-06 RX ORDER — IPRATROPIUM BROMIDE AND ALBUTEROL SULFATE 2.5; .5 MG/3ML; MG/3ML
1 SOLUTION RESPIRATORY (INHALATION)
Status: DISCONTINUED | OUTPATIENT
Start: 2025-02-07 | End: 2025-02-07

## 2025-02-06 RX ORDER — INSULIN LISPRO 100 [IU]/ML
0-16 INJECTION, SOLUTION INTRAVENOUS; SUBCUTANEOUS
Status: DISCONTINUED | OUTPATIENT
Start: 2025-02-06 | End: 2025-02-07 | Stop reason: HOSPADM

## 2025-02-06 RX ORDER — ACETAMINOPHEN 650 MG/1
650 SUPPOSITORY RECTAL EVERY 6 HOURS PRN
Status: DISCONTINUED | OUTPATIENT
Start: 2025-02-06 | End: 2025-02-07 | Stop reason: HOSPADM

## 2025-02-06 RX ORDER — ALBUTEROL SULFATE 0.83 MG/ML
2.5 SOLUTION RESPIRATORY (INHALATION)
Status: DISCONTINUED | OUTPATIENT
Start: 2025-02-06 | End: 2025-02-07 | Stop reason: HOSPADM

## 2025-02-06 RX ORDER — OSELTAMIVIR PHOSPHATE 75 MG/1
75 CAPSULE ORAL 2 TIMES DAILY
Qty: 10 CAPSULE | Refills: 0 | Status: SHIPPED | OUTPATIENT
Start: 2025-02-06 | End: 2025-02-06 | Stop reason: SDUPTHER

## 2025-02-06 RX ORDER — ENOXAPARIN SODIUM 100 MG/ML
40 INJECTION SUBCUTANEOUS DAILY
Status: DISCONTINUED | OUTPATIENT
Start: 2025-02-06 | End: 2025-02-06

## 2025-02-06 RX ORDER — ONDANSETRON 4 MG/1
4 TABLET, ORALLY DISINTEGRATING ORAL EVERY 8 HOURS PRN
Status: DISCONTINUED | OUTPATIENT
Start: 2025-02-06 | End: 2025-02-07 | Stop reason: HOSPADM

## 2025-02-06 RX ORDER — ONDANSETRON 2 MG/ML
4 INJECTION INTRAMUSCULAR; INTRAVENOUS EVERY 6 HOURS PRN
Status: DISCONTINUED | OUTPATIENT
Start: 2025-02-06 | End: 2025-02-07 | Stop reason: HOSPADM

## 2025-02-06 RX ORDER — SODIUM CHLORIDE 9 MG/ML
INJECTION, SOLUTION INTRAVENOUS CONTINUOUS
Status: DISCONTINUED | OUTPATIENT
Start: 2025-02-06 | End: 2025-02-07 | Stop reason: HOSPADM

## 2025-02-06 RX ADMIN — SODIUM CHLORIDE: 9 INJECTION, SOLUTION INTRAVENOUS at 22:26

## 2025-02-06 RX ADMIN — WATER 125 MG: 1 INJECTION INTRAMUSCULAR; INTRAVENOUS; SUBCUTANEOUS at 18:39

## 2025-02-06 RX ADMIN — APIXABAN 5 MG: 5 TABLET, FILM COATED ORAL at 23:40

## 2025-02-06 RX ADMIN — OSELTAMIVIR PHOSPHATE 75 MG: 75 CAPSULE ORAL at 23:40

## 2025-02-06 RX ADMIN — CEFTRIAXONE SODIUM 1000 MG: 1 INJECTION, POWDER, FOR SOLUTION INTRAMUSCULAR; INTRAVENOUS at 22:27

## 2025-02-06 RX ADMIN — TRAZODONE HYDROCHLORIDE 50 MG: 50 TABLET ORAL at 23:39

## 2025-02-06 RX ADMIN — ACETAMINOPHEN 650 MG: 325 TABLET ORAL at 18:40

## 2025-02-06 RX ADMIN — SODIUM CHLORIDE 1000 ML: 9 INJECTION, SOLUTION INTRAVENOUS at 18:53

## 2025-02-06 RX ADMIN — INSULIN LISPRO 12 UNITS: 100 INJECTION, SOLUTION INTRAVENOUS; SUBCUTANEOUS at 22:16

## 2025-02-06 RX ADMIN — IPRATROPIUM BROMIDE AND ALBUTEROL SULFATE 1 DOSE: .5; 3 SOLUTION RESPIRATORY (INHALATION) at 20:12

## 2025-02-06 RX ADMIN — Medication 3 MG: at 23:39

## 2025-02-06 RX ADMIN — METOPROLOL TARTRATE 75 MG: 50 TABLET, FILM COATED ORAL at 23:40

## 2025-02-06 RX ADMIN — METHYLPREDNISOLONE SODIUM SUCCINATE 40 MG: 40 INJECTION INTRAMUSCULAR; INTRAVENOUS at 23:41

## 2025-02-06 RX ADMIN — GUAIFENESIN 600 MG: 600 TABLET ORAL at 22:27

## 2025-02-06 RX ADMIN — AZITHROMYCIN DIHYDRATE 500 MG: 250 TABLET, FILM COATED ORAL at 22:27

## 2025-02-06 ASSESSMENT — PAIN SCALES - GENERAL: PAINLEVEL_OUTOF10: 0

## 2025-02-06 ASSESSMENT — PAIN - FUNCTIONAL ASSESSMENT
PAIN_FUNCTIONAL_ASSESSMENT: NONE - DENIES PAIN

## 2025-02-06 NOTE — ED TRIAGE NOTES
Pt presents to ED with chief complaint of influenza a. Pt states he tested positive yesterday. Reports fevers at home; last took ibuprofen @ 1600. Pt states he checked his pulse ox at home and it was low. Pulse ox 90-91% on arrival.

## 2025-02-06 NOTE — ED PROVIDER NOTES
[facility] EMERGENCY SERVICES ENCOUNTER        PATIENT NAME: Bruce Cooper  MRN: [id number]  : 1955  MCCANN: 2025  PROVIDER: Caleb Winter MD    Patient was seen and evaluated at 5:41 PM EST. Nurses Notes are reviewed and I agree except as noted in the HPI.  Chief Complaint   Patient presents with    Influenza     HISTORY OF PRESENT ILLNESS     A 69-year-old man presents with fever, hypoxia, rapid breathing, and tachycardia over last several days.  He was seen at urgent care yesterday diagnosed influenza A and he was prescribed oral prednisone.  Patient's PCP today also called for prescriptions of oseltamavir and Tessalon Perles.  He spiked a fever today with T of 101.7 and his SaO2 dropped to 85% room air at home this afternoon.     Patient's PMH is significant for previous heavy smoker, diabetes, atrial fibrillation on Eliquis, BPH, DM, factor V Leiden mutation, MTHFR mutation, HLD, HTN, OA, JEANETTE, and CVA.    He has mild bodyaches.  No headache.  No chest pain.  No nausea or vomiting.  No abdominal pain.  No diarrhea.  No skin rashes.     PAST MEDICAL HISTORY    has a past medical history of BPH (benign prostatic hyperplasia), Depression, Diabetes mellitus (HCC), Factor 5 Leiden mutation, heterozygous (HCC), Heterozygous MTHFR mutation [id number], Hyperglycemia, Hypertension, Hypertension, Osteoarthritis, Sleep apnea, Stroke (HCC), Type II or unspecified type diabetes mellitus without mention of complication, not stated as uncontrolled, and Visual field cut.    SURGICAL HISTORY      has a past surgical history that includes Appendectomy; Colonoscopy (12); cervical fusion (); Umbilical hernia repair (2012  Dr Fabrizio Gudino); Abdomen surgery; and Tonsillectomy.    CURRENT MEDICATIONS       Previous Medications    APIXABAN (ELIQUIS) 5 MG TABS TABLET    Indications: Do not resume for 1 week TAKE 1 TABLET TWICE A DAY    APPLE CIDER VINEGAR 188 MG CAPS    Take by mouth

## 2025-02-07 VITALS
BODY MASS INDEX: 22.08 KG/M2 | WEIGHT: 163 LBS | HEART RATE: 86 BPM | TEMPERATURE: 98.2 F | OXYGEN SATURATION: 91 % | RESPIRATION RATE: 18 BRPM | HEIGHT: 72 IN | SYSTOLIC BLOOD PRESSURE: 129 MMHG | DIASTOLIC BLOOD PRESSURE: 75 MMHG

## 2025-02-07 PROBLEM — J10.1 INFLUENZA A: Status: ACTIVE | Noted: 2025-02-07

## 2025-02-07 LAB
ANION GAP SERPL CALC-SCNC: 19 MEQ/L (ref 8–16)
BASOPHILS ABSOLUTE: 0 THOU/MM3 (ref 0–0.1)
BASOPHILS NFR BLD AUTO: 0.1 %
BUN SERPL-MCNC: 13 MG/DL (ref 7–22)
CALCIUM SERPL-MCNC: 9 MG/DL (ref 8.5–10.5)
CHLORIDE SERPL-SCNC: 100 MEQ/L (ref 98–111)
CO2 SERPL-SCNC: 23 MEQ/L (ref 23–33)
CREAT SERPL-MCNC: 0.6 MG/DL (ref 0.4–1.2)
DEPRECATED RDW RBC AUTO: 48.7 FL (ref 35–45)
EKG ATRIAL RATE: 99 BPM
EKG P AXIS: 76 DEGREES
EKG P-R INTERVAL: 132 MS
EKG Q-T INTERVAL: 328 MS
EKG QRS DURATION: 80 MS
EKG QTC CALCULATION (BAZETT): 420 MS
EKG R AXIS: 80 DEGREES
EKG T AXIS: 60 DEGREES
EKG VENTRICULAR RATE: 99 BPM
EOSINOPHIL NFR BLD AUTO: 0 %
EOSINOPHILS ABSOLUTE: 0 THOU/MM3 (ref 0–0.4)
ERYTHROCYTE [DISTWIDTH] IN BLOOD BY AUTOMATED COUNT: 13.6 % (ref 11.5–14.5)
GFR SERPL CREATININE-BSD FRML MDRD: > 90 ML/MIN/1.73M2
GLUCOSE BLD STRIP.AUTO-MCNC: 159 MG/DL (ref 70–108)
GLUCOSE BLD STRIP.AUTO-MCNC: 305 MG/DL (ref 70–108)
GLUCOSE SERPL-MCNC: 188 MG/DL (ref 70–108)
HCT VFR BLD AUTO: 47.2 % (ref 42–52)
HGB BLD-MCNC: 15.3 GM/DL (ref 14–18)
IMM GRANULOCYTES # BLD AUTO: 0.05 THOU/MM3 (ref 0–0.07)
IMM GRANULOCYTES NFR BLD AUTO: 0.5 %
LYMPHOCYTES ABSOLUTE: 0.6 THOU/MM3 (ref 1–4.8)
LYMPHOCYTES NFR BLD AUTO: 5.5 %
MCH RBC QN AUTO: 31.4 PG (ref 26–33)
MCHC RBC AUTO-ENTMCNC: 32.4 GM/DL (ref 32.2–35.5)
MCV RBC AUTO: 96.7 FL (ref 80–94)
MONOCYTES ABSOLUTE: 0.5 THOU/MM3 (ref 0.4–1.3)
MONOCYTES NFR BLD AUTO: 4.6 %
NEUTROPHILS ABSOLUTE: 9.7 THOU/MM3 (ref 1.8–7.7)
NEUTROPHILS NFR BLD AUTO: 89.3 %
NRBC BLD AUTO-RTO: 0 /100 WBC
OSMOLALITY SERPL CALC.SUM OF ELEC: 288.2 MOSMOL/KG (ref 275–300)
PLATELET # BLD AUTO: 156 THOU/MM3 (ref 130–400)
PMV BLD AUTO: 9.8 FL (ref 9.4–12.4)
POTASSIUM SERPL-SCNC: 4.8 MEQ/L (ref 3.5–5.2)
PROCALCITONIN SERPL IA-MCNC: 0.83 NG/ML (ref 0.01–0.09)
RBC # BLD AUTO: 4.88 MILL/MM3 (ref 4.7–6.1)
SODIUM SERPL-SCNC: 142 MEQ/L (ref 135–145)
WBC # BLD AUTO: 10.9 THOU/MM3 (ref 4.8–10.8)

## 2025-02-07 PROCEDURE — 2500000003 HC RX 250 WO HCPCS: Performed by: INTERNAL MEDICINE

## 2025-02-07 PROCEDURE — 6360000002 HC RX W HCPCS: Performed by: INTERNAL MEDICINE

## 2025-02-07 PROCEDURE — 6370000000 HC RX 637 (ALT 250 FOR IP): Performed by: INTERNAL MEDICINE

## 2025-02-07 PROCEDURE — 36415 COLL VENOUS BLD VENIPUNCTURE: CPT

## 2025-02-07 PROCEDURE — 85025 COMPLETE CBC W/AUTO DIFF WBC: CPT

## 2025-02-07 PROCEDURE — 6370000000 HC RX 637 (ALT 250 FOR IP)

## 2025-02-07 PROCEDURE — 94640 AIRWAY INHALATION TREATMENT: CPT

## 2025-02-07 PROCEDURE — 82948 REAGENT STRIP/BLOOD GLUCOSE: CPT

## 2025-02-07 PROCEDURE — 84145 PROCALCITONIN (PCT): CPT

## 2025-02-07 PROCEDURE — 93010 ELECTROCARDIOGRAM REPORT: CPT | Performed by: NUCLEAR MEDICINE

## 2025-02-07 PROCEDURE — 80048 BASIC METABOLIC PNL TOTAL CA: CPT

## 2025-02-07 RX ORDER — AZITHROMYCIN 500 MG/1
500 TABLET, FILM COATED ORAL NIGHTLY
Qty: 2 TABLET | Refills: 0 | Status: SHIPPED | OUTPATIENT
Start: 2025-02-07 | End: 2025-02-09

## 2025-02-07 RX ORDER — OSELTAMIVIR PHOSPHATE 75 MG/1
75 CAPSULE ORAL 2 TIMES DAILY
Qty: 10 CAPSULE | Refills: 0 | Status: SHIPPED | OUTPATIENT
Start: 2025-02-07 | End: 2025-02-12

## 2025-02-07 RX ORDER — ALBUTEROL SULFATE 90 UG/1
2 INHALANT RESPIRATORY (INHALATION) EVERY 6 HOURS PRN
Qty: 8.5 G | Refills: 3 | Status: SHIPPED | OUTPATIENT
Start: 2025-02-07

## 2025-02-07 RX ADMIN — OSELTAMIVIR PHOSPHATE 75 MG: 75 CAPSULE ORAL at 09:14

## 2025-02-07 RX ADMIN — LISINOPRIL 40 MG: 40 TABLET ORAL at 09:16

## 2025-02-07 RX ADMIN — METOPROLOL TARTRATE 75 MG: 50 TABLET, FILM COATED ORAL at 09:15

## 2025-02-07 RX ADMIN — INSULIN LISPRO 12 UNITS: 100 INJECTION, SOLUTION INTRAVENOUS; SUBCUTANEOUS at 12:32

## 2025-02-07 RX ADMIN — METHYLPREDNISOLONE SODIUM SUCCINATE 40 MG: 40 INJECTION INTRAMUSCULAR; INTRAVENOUS at 06:14

## 2025-02-07 RX ADMIN — ACETAMINOPHEN 650 MG: 325 TABLET ORAL at 09:16

## 2025-02-07 RX ADMIN — APIXABAN 5 MG: 5 TABLET, FILM COATED ORAL at 09:16

## 2025-02-07 RX ADMIN — IPRATROPIUM BROMIDE AND ALBUTEROL SULFATE 1 DOSE: .5; 3 SOLUTION RESPIRATORY (INHALATION) at 08:00

## 2025-02-07 RX ADMIN — GUAIFENESIN 600 MG: 600 TABLET ORAL at 09:14

## 2025-02-07 ASSESSMENT — PAIN SCALES - GENERAL: PAINLEVEL_OUTOF10: 0

## 2025-02-07 ASSESSMENT — PAIN SCALES - WONG BAKER: WONGBAKER_NUMERICALRESPONSE: NO HURT

## 2025-02-07 NOTE — CARE COORDINATION
Case Management Assessment Initial Evaluation    Date/Time of Evaluation: 2025 12:19 PM  Assessment Completed by: Emilia Burks RN    If patient is discharged prior to next notation, then this note serves as note for discharge by case management.    Patient Name: Bruce Cooper                   YOB: 1955  Diagnosis: Influenza A [J10.1]  Acute respiratory failure with hypoxia [J96.01]  Acute hypoxemic respiratory failure [J96.01]                   Date / Time: 2025  5:14 PM  [facility]     Patient Admission Status: Inpatient   Readmission Risk Low 0-14, Mod 15-19), High > 20: Readmission Risk Score: 15.5    Current PCP: Eusebio Agudelo APRN - CNP  Health Care Decision Makers:   Primary Decision Maker: AllisonCee A - Spouse - [phone]    Additional Case Management Notes: Pt presented to the ED with fever, hypoxia, tachypnea, and tachycardia. + for Influenza A. Pulse ox read 85% at home on room air. Admitted by hospitalist. Originally requiring supplemental oxygen, now on room air. IVF. IV Rocephin. IV Solumedrol. Plans to transition both to PO. Tamiflu.     Procedures: n/a    Imagin/6 CXR: Mild peribronchial thickening may indicate bronchitis.     Patient Goals/Plan/Treatment Preferences: From home with wife. No needs identified.          25 1226   Service Assessment   Patient Orientation Alert and Oriented   Cognition Alert   History Provided By Medical Record   Primary Caregiver Self   Accompanied By/Relationship n/a   Support Systems Spouse/Significant Other;Children;Family Members   Patient's Healthcare Decision Maker is: Named in Scanned ACP Document   PCP Verified by CM Yes   Last Visit to PCP Within last 3 months   Prior Functional Level Independent in ADLs/IADLs   Current Functional Level Independent in ADLs/IADLs   Can patient return to prior living arrangement Yes   Ability to make needs known: Good   Family able to assist with home care needs: Yes

## 2025-02-07 NOTE — DISCHARGE SUMMARY
Discharge Summary    Bruce Cooper  :  1955  MRN:  [id number]    Admit date:  2025  Discharge date:      Admitting Physician:  Tarah Diaz MD    Discharge Diagnoses:       Acute hypoxemic respiratory failure.  Influenza A bronchitis.  Diabetes mellitus type 2  Hypertension.  Patient Active Problem List   Diagnosis    Hypertension    Depression    Osteoarthritis    BPH (benign prostatic hyperplasia)    Hyperglycemia    DM II (diabetes mellitus, type II), controlled (HCC)    Umbilical hernia    Central sleep apnea due to Cheyne-Spence respiration    Visual field cut    Acute ischemic stroke (HCC)    Cerebral artery occlusion with cerebral infarction (HCC)    Atrial fibrillation with RVR (HCC)    Secondary hypercoagulable state (HCC)    Colitis    Allergic rhinitis    ETD (eustachian tube dysfunction)    Sensorineural hearing loss (SNHL)    History of stroke    Tobacco abuse    Acute respiratory failure with hypoxia    Influenza A       Admission Condition:  serious  Discharged Condition:  good    Hospital Course:   Patient was admitted with help syndrome.  He was influenza A positive.  He was hypoxemic.  Chest x-ray showed bronchitis changes.  He was treated with Tamiflu oxygen supplementation bronchodilators and steroid.  He improved.  Was on room air at the time of evaluation.  He has been discharged home.  Will complete course of Tamiflu , azithromycin.  Albuterol as needed.  Follow-up with family MD as outpatient.    Discharge Medications:         Medication List        START taking these medications      albuterol sulfate  (90 Base) MCG/ACT inhaler  Commonly known as: PROVENTIL;VENTOLIN;PROAIR  Inhale 2 puffs into the lungs every 6 hours as needed for Wheezing     azithromycin 500 MG tablet  Commonly known as: ZITHROMAX  Take 1 tablet by mouth at bedtime for 2 doses            CHANGE how you take these medications      cetirizine 5 MG tablet  Commonly known as: ZYRTEC  Take 1 tablet by

## 2025-02-07 NOTE — PLAN OF CARE
Problem: Chronic Conditions and Co-morbidities  Goal: Patient's chronic conditions and co-morbidity symptoms are monitored and maintained or improved  Outcome: Progressing     Problem: Discharge Planning  Goal: Discharge to home or other facility with appropriate resources  Outcome: Progressing     Problem: ABCDS Injury Assessment  Goal: Absence of physical injury  Outcome: Progressing     Problem: Respiratory - Adult  Goal: Achieves optimal ventilation and oxygenation  Reactivated     Problem: Infection - Adult  Goal: Absence of infection at discharge  Reactivated  Goal: Absence of infection during hospitalization  Reactivated

## 2025-02-07 NOTE — H&P
Internal Medicine  History and Physical    Patient:  Bruce Cooper  MRN: [id number]      History Obtained From:  patient  PCP: Eusebio Agudelo APRN - CNP    CHIEF COMPLAINT: Cough chest pain shortness of breath fever.    HISTORY OF PRESENT ILLNESS:   The patient is a 69 y.o. male chronic tobacco chewer, history of A-fib, DM 2 who presents with 3 days of insidious onset of cough chest pain shortness of breath and fever.  His cough was productive of clear phlegm.  Had a temperature 103 at home yesterday subsequently came to the ER.  ER evaluation showed patient to be influenza positive.  Chest x-ray showed features consistent with acute bronchitis.  He was also hypoxemic.  He was treated with antibiotics, Tamiflu and started on oxygen.    Past Medical History:        Diagnosis Date    BPH (benign prostatic hyperplasia)     Depression     Diabetes mellitus (HCC)     Factor 5 Leiden mutation, heterozygous (HCC)     Heterozygous MTHFR mutation [id number]     Hyperglycemia     Hypertension     Hypertension     Osteoarthritis     Sleep apnea     Stroke (HCC) 8/8/2013    Type II or unspecified type diabetes mellitus without mention of complication, not stated as uncontrolled 8/2012    Visual field cut 8/8/2013    Right visual field cut       Past Surgical History:        Procedure Laterality Date    ABDOMEN SURGERY      APPENDECTOMY      CERVICAL FUSION  08    COLONOSCOPY  6/8/12    [facility]    TONSILLECTOMY      UMBILICAL HERNIA REPAIR  09/17/2012  Dr Fabrizio Gudino       Medications Prior to Admission:    Prior to Admission medications    Medication Sig Start Date End Date Taking? Authorizing Provider   melatonin 5 MG TABS tablet Take 1 tablet by mouth nightly   Yes Tico Jeter MD   apixaban (ELIQUIS) 5 MG TABS tablet Indications: Do not resume for 1 week TAKE 1 TABLET TWICE A DAY 1/28/25  Yes Bell Ernst MD   Apple Cider Vinegar 188 MG CAPS Take by mouth   Yes Tico Jeter MD   Metoprolol Tartrate

## 2025-02-08 LAB
BACTERIA BLD AEROBE CULT: NORMAL
BACTERIA BLD AEROBE CULT: NORMAL

## 2025-02-10 ENCOUNTER — TELEPHONE (OUTPATIENT)
Dept: FAMILY MEDICINE CLINIC | Age: 70
End: 2025-02-10

## 2025-02-10 NOTE — TELEPHONE ENCOUNTER
Care Transitions Initial Follow Up Call    Outreach made within 2 business days of discharge: Yes    Patient: Bruce Cooper Patient : 1955   MRN: [id number]  Reason for Admission: Influenza A   Discharge Date: 25       Spoke with: Adam Cooper     Discharge department/facility: home     TCM Interactive Patient Contact:  Was patient able to fill all prescriptions: Yes  Was patient instructed to bring all medications to the follow-up visit: Yes  Is patient taking all medications as directed in the discharge summary? Yes  Does patient understand their discharge instructions: Yes  Does patient have questions or concerns that need addressed prior to 7-14 day follow up office visit: no    Additional needs identified to be addressed with provider  No needs identified             Scheduled appointment with PCP within 7-14 days    Follow Up  Future Appointments   Date Time Provider Department Center   2025 10:40 AM Eusebio Agudelo APRN - CNP [facility] E[facility] DEP   2025  9:30 AM Bell Ernst MD N [facility]   2025 11:20 AM STR CT IMAGING RM1  [facility] OUT EXP STR Rad/Card   3/4/2025  1:00 PM Trudy Back APRN - CNP N Pulm Med [facility] [place]   3/5/2025 11:15 AM Jb Vila MD ENDO [facility] [place]   2025 11:00 AM Amarilys Ayala RN [facility] Physic [facility] ECC DEP       Alexus Rodriguez

## 2025-02-11 LAB
BACTERIA BLD AEROBE CULT: NORMAL
BACTERIA BLD AEROBE CULT: NORMAL

## 2025-02-13 NOTE — TELEPHONE ENCOUNTER
Pt cancelled (wants to reschedule) 2/14/25 appointment via [facility] scheduling ticket sent to pt to reschedule.

## 2025-02-17 ENCOUNTER — OFFICE VISIT (OUTPATIENT)
Dept: FAMILY MEDICINE CLINIC | Age: 70
End: 2025-02-17

## 2025-02-17 VITALS
WEIGHT: 163 LBS | TEMPERATURE: 97.3 F | RESPIRATION RATE: 16 BRPM | SYSTOLIC BLOOD PRESSURE: 118 MMHG | DIASTOLIC BLOOD PRESSURE: 62 MMHG | HEART RATE: 72 BPM | BODY MASS INDEX: 22.11 KG/M2

## 2025-02-17 DIAGNOSIS — R63.4 WEIGHT LOSS: ICD-10-CM

## 2025-02-17 DIAGNOSIS — D68.69 SECONDARY HYPERCOAGULABLE STATE: ICD-10-CM

## 2025-02-17 DIAGNOSIS — I10 ESSENTIAL HYPERTENSION: ICD-10-CM

## 2025-02-17 DIAGNOSIS — J11.1 INFLUENZA: ICD-10-CM

## 2025-02-17 DIAGNOSIS — I48.91 ATRIAL FIBRILLATION WITH RVR (HCC): Primary | ICD-10-CM

## 2025-02-17 DIAGNOSIS — Z87.891 HISTORY OF CIGARETTE SMOKING: ICD-10-CM

## 2025-02-17 DIAGNOSIS — E11.65 TYPE 2 DIABETES MELLITUS WITH HYPERGLYCEMIA, WITHOUT LONG-TERM CURRENT USE OF INSULIN (HCC): ICD-10-CM

## 2025-02-17 DIAGNOSIS — G47.00 INSOMNIA, UNSPECIFIED TYPE: ICD-10-CM

## 2025-02-17 RX ORDER — BENAZEPRIL HYDROCHLORIDE 40 MG/1
40 TABLET ORAL DAILY
Qty: 90 TABLET | Refills: 3 | Status: SHIPPED | OUTPATIENT
Start: 2025-02-17

## 2025-02-17 RX ORDER — TRAZODONE HYDROCHLORIDE 50 MG/1
50 TABLET ORAL NIGHTLY
Qty: 90 TABLET | Refills: 3 | Status: SHIPPED | OUTPATIENT
Start: 2025-02-17

## 2025-02-17 ASSESSMENT — ENCOUNTER SYMPTOMS
WHEEZING: 0
ABDOMINAL DISTENTION: 0
BACK PAIN: 0
SHORTNESS OF BREATH: 0
CHEST TIGHTNESS: 0
COUGH: 1
ABDOMINAL PAIN: 0

## 2025-02-17 NOTE — PROGRESS NOTES
[facility] PROFESSIONAL SERVS  [place]  [address] [address]  Dept: [phone]  Dept Fax: [phone]  Loc: [phone]  PROGRESS NOTE      VisitDate: 2/17/2025    rBuce Cooper is a 69 y.o. male who presents today for:     Chief Complaint   Patient presents with    Follow-Up from Hospital     [facility] 2/6/25-2//7/25, acute resp failure with hypoxia, flu a, doing 90% better still has ongoing cough and some head congestion         Subjective:  HPI      Review of Systems  Past Medical History:   Diagnosis Date    BPH (benign prostatic hyperplasia)     Depression     Diabetes mellitus (HCC)     Factor 5 Leiden mutation, heterozygous (HCC)     Heterozygous MTHFR mutation G0204I     Hyperglycemia     Hypertension     Hypertension     Osteoarthritis     Sleep apnea     Stroke (HCC) 8/8/2013    Type II or unspecified type diabetes mellitus without mention of complication, not stated as uncontrolled 8/2012    Visual field cut 8/8/2013    Right visual field cut      Past Surgical History:   Procedure Laterality Date    ABDOMEN SURGERY      APPENDECTOMY      CERVICAL FUSION  08    COLONOSCOPY  6/8/12    [facility]    TONSILLECTOMY      UMBILICAL HERNIA REPAIR  09/17/2012  Dr Fabrizio Gudino     Family History   Problem Relation Age of Onset    Diabetes Father     Diabetes Brother     Stroke Brother      Social History     Tobacco Use    Smoking status: Former     Types: E-Cigarettes, Cigars     Start date: 1/1/2021    Smokeless tobacco: Never    Tobacco comments:     Quit smoking 2013.  States he uses Vap   Substance Use Topics    Alcohol use: Yes     Alcohol/week: 0.0 standard drinks of alcohol     Comment: social      Current Outpatient Medications   Medication Sig Dispense Refill    albuterol sulfate HFA (PROVENTIL;VENTOLIN;PROAIR) 108 (90 Base) MCG/ACT inhaler Inhale 2 puffs into the lungs every 6 hours as needed for Wheezing 8.5 g 3    semaglutide, 2 MG/DOSE, (OZEMPIC) 8

## 2025-02-17 NOTE — PROGRESS NOTES
Post-Discharge Transitional Care Management Services or Hospital Follow Up      Bruce Cooper   YOB: 1955    Date of Office Visit:  2/17/2025  Date of Hospital Admission: 2/6/25  Date of Hospital Discharge: 2/7/25  Readmission Risk Score(high >=14%. Medium >=10%):Readmission Risk Score: 15.5      Care management risk score Rising risk (score 2-5) and Complex Care (Scores >=6): No Risk Score On File     Non faceto face  following discharge, date last encounter closed (first attempt may have been earlier): 02/10/2025 02/10/2025    Call initiated 2 business days ofdischarge: Yes     Patient Active Problem List   Diagnosis    Hypertension    Depression    Osteoarthritis    BPH (benign prostatic hyperplasia)    Hyperglycemia    DM II (diabetes mellitus, type II), controlled (HCC)    Umbilical hernia    Central sleep apnea due to Cheyne-Spence respiration    Visual field cut    Acute ischemic stroke (HCC)    Cerebral artery occlusion with cerebral infarction (HCC)    Atrial fibrillation with RVR (HCC)    Secondary hypercoagulable state (HCC)    Colitis    Allergic rhinitis    ETD (eustachian tube dysfunction)    Sensorineural hearing loss (SNHL)    History of stroke    Tobacco abuse    Acute respiratory failure with hypoxia    Influenza A       No Known Allergies    Medications listed as ordered at the time of discharge from hospital     Medication List            Accurate as of February 17, 2025 11:41 AM. If you have any questions, ask your nurse or doctor.                CHANGE how you take these medications      cetirizine 5 MG tablet  Commonly known as: ZYRTEC  Take 1 tablet by mouth at bedtime  What changed: when to take this            CONTINUE taking these medications      Accu-Chek Guide w/Device Kit     Accu-Chek SmartView strip  Generic drug: blood glucose test strips  TEST BLOOD SUGAR ONCE A DAY     albuterol sulfate  (90 Base) MCG/ACT inhaler  Commonly known as:

## 2025-02-24 ENCOUNTER — OFFICE VISIT (OUTPATIENT)
Dept: CARDIOLOGY CLINIC | Age: 70
End: 2025-02-24
Payer: MEDICARE

## 2025-02-24 ENCOUNTER — HOSPITAL ENCOUNTER (OUTPATIENT)
Dept: CT IMAGING | Age: 70
Discharge: HOME | End: 2025-02-24
Attending: INTERNAL MEDICINE
Payer: MEDICARE

## 2025-02-24 VITALS
SYSTOLIC BLOOD PRESSURE: 162 MMHG | WEIGHT: 168.4 LBS | HEART RATE: 80 BPM | HEIGHT: 72 IN | DIASTOLIC BLOOD PRESSURE: 80 MMHG | BODY MASS INDEX: 22.81 KG/M2

## 2025-02-24 DIAGNOSIS — I48.0 PAROXYSMAL ATRIAL FIBRILLATION (HCC): Primary | ICD-10-CM

## 2025-02-24 DIAGNOSIS — Z87.891 PERSONAL HISTORY OF TOBACCO USE: ICD-10-CM

## 2025-02-24 DIAGNOSIS — I10 PRIMARY HYPERTENSION: ICD-10-CM

## 2025-02-24 PROCEDURE — G8420 CALC BMI NORM PARAMETERS: HCPCS | Performed by: NUCLEAR MEDICINE

## 2025-02-24 PROCEDURE — G8427 DOCREV CUR MEDS BY ELIG CLIN: HCPCS | Performed by: NUCLEAR MEDICINE

## 2025-02-24 PROCEDURE — 1123F ACP DISCUSS/DSCN MKR DOCD: CPT | Performed by: NUCLEAR MEDICINE

## 2025-02-24 PROCEDURE — 3077F SYST BP >= 140 MM HG: CPT | Performed by: NUCLEAR MEDICINE

## 2025-02-24 PROCEDURE — 71271 CT THORAX LUNG CANCER SCR C-: CPT

## 2025-02-24 PROCEDURE — 1159F MED LIST DOCD IN RCRD: CPT | Performed by: NUCLEAR MEDICINE

## 2025-02-24 PROCEDURE — 1036F TOBACCO NON-USER: CPT | Performed by: NUCLEAR MEDICINE

## 2025-02-24 PROCEDURE — 3017F COLORECTAL CA SCREEN DOC REV: CPT | Performed by: NUCLEAR MEDICINE

## 2025-02-24 PROCEDURE — 99213 OFFICE O/P EST LOW 20 MIN: CPT | Performed by: NUCLEAR MEDICINE

## 2025-02-24 PROCEDURE — 3079F DIAST BP 80-89 MM HG: CPT | Performed by: NUCLEAR MEDICINE

## 2025-02-24 NOTE — PROGRESS NOTES
[facility] [place] [facility]  [facility] CARDIOLOGY  [address]  [address]  [place] [facility] [address]  Dept: [phone]  Dept Fax: [phone]  Loc: [phone]    Visit Date: 2/24/2025    Bruce Cooper is a 69 y.o. male who presents todayfor:  Chief Complaint   Patient presents with    Follow-up    Hypertension    Atrial Fibrillation   Known A fib   Seems stable  No recent episodes  Known HTN   Seems higher today   Usually not too bad  No dizziness  No syncope  Recovering from Flu        HPI:  HPI  Past Medical History:   Diagnosis Date    BPH (benign prostatic hyperplasia)     Depression     Diabetes mellitus (HCC)     Factor 5 Leiden mutation, heterozygous     Heterozygous MTHFR mutation [id number]     Hyperglycemia     Hypertension     Hypertension     Osteoarthritis     Sleep apnea     Stroke (HCC) 8/8/2013    Type II or unspecified type diabetes mellitus without mention of complication, not stated as uncontrolled 8/2012    Visual field cut 8/8/2013    Right visual field cut      Past Surgical History:   Procedure Laterality Date    ABDOMEN SURGERY      APPENDECTOMY      CERVICAL FUSION  08    COLONOSCOPY  6/8/12    [facility]    TONSILLECTOMY      UMBILICAL HERNIA REPAIR  09/17/2012  Dr Fabrizio Gudino     Family History   Problem Relation Age of Onset    Diabetes Father     Diabetes Brother     Stroke Brother      Social History     Tobacco Use    Smoking status: Former     Types: E-Cigarettes, Cigars     Start date: 1/1/2021    Smokeless tobacco: Never    Tobacco comments:     Quit smoking 2013.  States he uses Vap   Substance Use Topics    Alcohol use: Yes     Alcohol/week: 0.0 standard drinks of alcohol     Comment: social      Current Outpatient Medications   Medication Sig Dispense Refill    ZINC PO Take 50 mg by mouth      Bioflavonoid Products (KERRY-C PO) Take by mouth      NONFORMULARY POTASSIUM VIT D and [place]ite      OLIVE LEAF EXTRACT PO Take by mouth      Amino Acids (COMPLETE AMINO

## 2025-02-27 RX ORDER — TAMSULOSIN HYDROCHLORIDE 0.4 MG/1
0.4 CAPSULE ORAL DAILY
Qty: 3090 CAPSULE | Refills: 3 | Status: SHIPPED | OUTPATIENT
Start: 2025-02-27

## 2025-03-04 ENCOUNTER — TELEPHONE (OUTPATIENT)
Dept: INTERNAL MEDICINE CLINIC | Age: 70
End: 2025-03-04

## 2025-03-04 DIAGNOSIS — E11.65 TYPE 2 DIABETES MELLITUS WITH HYPERGLYCEMIA, WITHOUT LONG-TERM CURRENT USE OF INSULIN (HCC): Primary | ICD-10-CM

## 2025-03-04 NOTE — TELEPHONE ENCOUNTER
Adam is out of his Ozempic. Called [facility] - they are experiencing shipping delays. They provided a voucher for a fill at a local pharmacy.     For Pharmacy Admin Tracking Only    Program: Medical Group  CPA in place:  Yes  Recommendation Provided To: Patient/Caregiver: 2 via Telephone  Intervention Detail: Benefit Assistance and Refill(s) Provided  Intervention Accepted By: Patient/Caregiver: 2  Gap Closed?: No   Time Spent (min): 20        Tricia Garsia, PharmD, BCPS, [facility]  Internal Medicine Clinical Pharmacist  [phone]

## 2025-03-05 ENCOUNTER — OFFICE VISIT (OUTPATIENT)
Age: 70
End: 2025-03-05

## 2025-03-05 VITALS
RESPIRATION RATE: 18 BRPM | DIASTOLIC BLOOD PRESSURE: 94 MMHG | BODY MASS INDEX: 22.84 KG/M2 | HEART RATE: 77 BPM | HEIGHT: 72 IN | SYSTOLIC BLOOD PRESSURE: 144 MMHG | WEIGHT: 168.6 LBS

## 2025-03-05 DIAGNOSIS — E11.65 TYPE 2 DIABETES MELLITUS WITH HYPERGLYCEMIA, WITHOUT LONG-TERM CURRENT USE OF INSULIN (HCC): Primary | ICD-10-CM

## 2025-03-05 NOTE — PROGRESS NOTES
Diabetes Checklist    Date: 3/5/25   Patient Name: Bruce Cooper   YOB: 1955     When were you Diagnosed with Diabetes? About 10 years ago   Current Treatment? Ozempic,   Prior Treatment? Metformin   Diet Plan? Carbohydrate Counting - sometimes   Exercise Plan? Home exercises  Diabetes Complications? Stroke - stroke 6 to 7 years ago     Do you experience any of the following symptoms?   Symptoms Yes No   Tingling or Numbness in Toes  []   [x]    Burning in Feet  []   [x]    Frequent  Urination  [x]   []    Blurry Vision  []   [x]    Open Wounds on Feet  []   [x]      Do you have any other concerns today? no    
No joint swelling or deformity.  Psychiatry: Alert and oriented ×3.  Making good eye contact.  Affect is normal.     Diabetic foot exam:   Left Foot:   Visual Exam: bilateral hammer toes and caluses   Pulse DP: 2+ (normal)   Filament test: normal sensation    Right Foot:   Visual Exam: bilateral hammer toes and caluses   Pulse DP: 2+ (normal)   Filament test: normal sensation       Assessment/Plan:  Assessment & Plan  1. Type 2 Diabetes Mellitus  - A1c level slightly elevated at 7.4, indicating a need for improved glycemic control  - Commendable dietary habits and physical activity levels  - Current medication regimen appears effective  - Advised to maintain current diet and exercise routine  - Continue with existing medications (Ozempic 2 mg weekly, Farxiga 10 mg daily)  - Encouraged to monitor blood glucose levels before meals and provide a record of readings for further evaluation  - Discussed potential side effects of Ozempic (pancreatitis, medullary thyroid cancer, retinal complications)    - Report symptoms such as abdominal pain, nausea, vomiting, fullness, choking, or changes in vision  - Discussed potential side effects of Farxiga (dehydration, kidney issues, ketone buildup, rare skin infections)    - Stay hydrated and report symptoms such as extreme fatigue, lightheadedness, or skin sores    Follow-up  - Patient to follow up in 3 months  Data:  I reviewed CMP from last month and A1c from December.    No orders of the defined types were placed in this encounter.      The risks and benefits of my recommendations, as well as other treatment options were discussed with the patient today. Questions were answered.  Follow up: 3 months and as needed.         Electronically signed by Jb Vila MD 3/5/2025 12:20 PM     **This report has been created using voice recognition software. It may   contain minor errors which are inherent in voice recognition technology.**

## 2025-03-09 PROBLEM — J10.1 INFLUENZA A: Status: RESOLVED | Noted: 2025-02-07 | Resolved: 2025-03-09

## 2025-03-10 ENCOUNTER — OFFICE VISIT (OUTPATIENT)
Dept: PULMONOLOGY | Age: 70
End: 2025-03-10
Payer: MEDICARE

## 2025-03-10 VITALS
DIASTOLIC BLOOD PRESSURE: 72 MMHG | HEART RATE: 77 BPM | HEIGHT: 72 IN | WEIGHT: 166.2 LBS | SYSTOLIC BLOOD PRESSURE: 158 MMHG | BODY MASS INDEX: 22.51 KG/M2 | OXYGEN SATURATION: 98 % | TEMPERATURE: 97.7 F

## 2025-03-10 DIAGNOSIS — Z87.891 PERSONAL HISTORY OF TOBACCO USE: Primary | ICD-10-CM

## 2025-03-10 DIAGNOSIS — Z72.0 TOBACCO ABUSE: Chronic | ICD-10-CM

## 2025-03-10 DIAGNOSIS — J43.9 PULMONARY EMPHYSEMA, UNSPECIFIED EMPHYSEMA TYPE (HCC): ICD-10-CM

## 2025-03-10 PROBLEM — K52.9 COLITIS: Status: RESOLVED | Noted: 2024-01-05 | Resolved: 2025-03-10

## 2025-03-10 PROBLEM — M48.061 SPINAL STENOSIS OF LUMBAR REGION: Status: ACTIVE | Noted: 2025-03-10

## 2025-03-10 PROBLEM — J96.01 ACUTE RESPIRATORY FAILURE WITH HYPOXIA: Status: RESOLVED | Noted: 2025-02-06 | Resolved: 2025-03-10

## 2025-03-10 PROCEDURE — 1123F ACP DISCUSS/DSCN MKR DOCD: CPT | Performed by: INTERNAL MEDICINE

## 2025-03-10 PROCEDURE — 3078F DIAST BP <80 MM HG: CPT | Performed by: INTERNAL MEDICINE

## 2025-03-10 PROCEDURE — G8420 CALC BMI NORM PARAMETERS: HCPCS | Performed by: INTERNAL MEDICINE

## 2025-03-10 PROCEDURE — 3023F SPIROM DOC REV: CPT | Performed by: INTERNAL MEDICINE

## 2025-03-10 PROCEDURE — 99213 OFFICE O/P EST LOW 20 MIN: CPT | Performed by: INTERNAL MEDICINE

## 2025-03-10 PROCEDURE — 3077F SYST BP >= 140 MM HG: CPT | Performed by: INTERNAL MEDICINE

## 2025-03-10 PROCEDURE — 1159F MED LIST DOCD IN RCRD: CPT | Performed by: INTERNAL MEDICINE

## 2025-03-10 PROCEDURE — G8427 DOCREV CUR MEDS BY ELIG CLIN: HCPCS | Performed by: INTERNAL MEDICINE

## 2025-03-10 PROCEDURE — 3017F COLORECTAL CA SCREEN DOC REV: CPT | Performed by: INTERNAL MEDICINE

## 2025-03-10 PROCEDURE — G0296 VISIT TO DETERM LDCT ELIG: HCPCS | Performed by: INTERNAL MEDICINE

## 2025-03-10 PROCEDURE — 1036F TOBACCO NON-USER: CPT | Performed by: INTERNAL MEDICINE

## 2025-03-10 PROCEDURE — 1160F RVW MEDS BY RX/DR IN RCRD: CPT | Performed by: INTERNAL MEDICINE

## 2025-03-10 ASSESSMENT — ENCOUNTER SYMPTOMS
HEMOPTYSIS: 0
COUGH: 1
CONSTIPATION: 1
CHOKING: 0
WHEEZING: 0
RHINORRHEA: 1
HEARTBURN: 0
VOICE CHANGE: 0
STRIDOR: 0
CHEST TIGHTNESS: 0
SHORTNESS OF BREATH: 0
BLOOD IN STOOL: 0
TROUBLE SWALLOWING: 0

## 2025-03-10 NOTE — PROGRESS NOTES
[facility]    Patient: JIMMY SCHMIDT, 69 y.o.   : 1955  3/10/2025         Subjective     Chief Complaint   Patient presents with    Follow-up     1 year pulm f/u with CT 25          Cough  This is a recurrent problem. The current episode started more than 1 month ago. The problem has been resolved. Associated symptoms include nasal congestion, postnasal drip and rhinorrhea. Pertinent negatives include no chest pain, chills, fever, heartburn, hemoptysis, rash, shortness of breath, weight loss or wheezing. Risk factors for lung disease include smoking/tobacco exposure. He has tried nothing for the symptoms. His past medical history is significant for emphysema and pneumonia (did recently get influenza and was admitted to the hospital). There is no history of asthma, bronchiectasis, bronchitis or COPD.     He is a current smoker currently smoking cigars small ones he says 6 a day or so also uses a vape that he says is without nicotine.  Was a smoker of maybe a pack a day to half a pack a day for 40 years or so, some of those years did smoke a pack and a half a day, after his last CVA stopped cigarettes.     No change in family, past medical history, social history.    CURRENT MEDICATIONS:  Current Outpatient Medications   Medication Sig Dispense Refill    semaglutide, 2 MG/DOSE, (OZEMPIC) 8 MG/3ML SOPN sc injection Inject 2 mg into the skin every 7 days See voucher information below 3 mL 0    tamsulosin (FLOMAX) 0.4 MG capsule Take 1 capsule by mouth daily 3090 capsule 3    ZINC PO Take 50 mg by mouth      Bioflavonoid Products (KERRY-C PO) Take by mouth      NONFORMULARY POTASSIUM VIT D and [place]      OLIVE LEAF EXTRACT PO Take by mouth      Amino Acids (COMPLETE AMINO ACID MIX PO) Take by mouth      MAGNESIUM GLYCINATE PO Take by mouth      Misc Natural Products (GLUCOSAMINE CHOND MSM FORMULA PO) Take by mouth      Berberine Chloride (BERBERINE HCI PO) Take by mouth      Doon-3

## 2025-03-11 ENCOUNTER — TELEPHONE (OUTPATIENT)
Dept: CARDIOLOGY CLINIC | Age: 70
End: 2025-03-11

## 2025-03-11 NOTE — TELEPHONE ENCOUNTER
Normally we don't recommend taking any vitamins or calcium supplements just because.. there has to be a specific medical reason, otherwise it could also cause harm if taken just to take them   As of the calcification, it is not unusual at his age   If having any cardiac symptoms, CP, SOB, etc we can plan a follow up cardiolite stress test to make sure the plaque in the heart is not significant enough to be of any concern  Otherwise keep appointment and will discuss there

## 2025-03-11 NOTE — TELEPHONE ENCOUNTER
Bruce Cooper \"Adam\" to [facility] Clinical Staff (supporting Bell Ernst MD)         3/10/25  6:12 PM  Dr. Mendoza:     The following is from Adam's CT Scan of lungs regarding his heart:     The heart is normal in size. There are some coronary artery calcifications.     He is on Vitamin D3 5000 units.  I have read it can cause artery calcifications.   Are you concerned with this?  Should he have his Vitamin D levels checked?     Thank you!  Adam Cooper / Nicole Cooper  [phone]

## 2025-03-25 DIAGNOSIS — Z72.0 TOBACCO ABUSE: Chronic | ICD-10-CM

## 2025-03-25 DIAGNOSIS — J43.9 PULMONARY EMPHYSEMA, UNSPECIFIED EMPHYSEMA TYPE (HCC): ICD-10-CM

## 2025-03-25 PROCEDURE — 36415 COLL VENOUS BLD VENIPUNCTURE: CPT | Performed by: INTERNAL MEDICINE

## 2025-03-26 RX ORDER — METOPROLOL TARTRATE 75 MG/1
1 TABLET ORAL 2 TIMES DAILY
Qty: 180 TABLET | Refills: 3 | Status: SHIPPED | OUTPATIENT
Start: 2025-03-26

## 2025-03-28 DIAGNOSIS — E11.65 TYPE 2 DIABETES MELLITUS WITH HYPERGLYCEMIA, WITHOUT LONG-TERM CURRENT USE OF INSULIN (HCC): ICD-10-CM

## 2025-04-02 RX ORDER — SEMAGLUTIDE 2.68 MG/ML
INJECTION, SOLUTION SUBCUTANEOUS
Qty: 3 ML | Refills: 3 | Status: SHIPPED | OUTPATIENT
Start: 2025-04-02

## 2025-04-17 ENCOUNTER — TELEPHONE (OUTPATIENT)
Dept: CARDIOLOGY CLINIC | Age: 70
End: 2025-04-17

## 2025-04-17 NOTE — TELEPHONE ENCOUNTER
Patient dropped of patient assistance application for eliquis. Processed and faxed at this time.   Will see what Oumar Hylton responds back with as patient is a MEDICARE and will probably have to use the prescription payment plan through his insurance.

## 2025-04-29 RX ORDER — DAPAGLIFLOZIN 10 MG/1
10 TABLET, FILM COATED ORAL EVERY MORNING
Qty: 90 TABLET | Refills: 3 | Status: SHIPPED | OUTPATIENT
Start: 2025-04-29

## 2025-04-29 NOTE — TELEPHONE ENCOUNTER
New rx needed for [place] PAP program, farxiga, pended for approval    Will need faxed to [phone]

## 2025-05-14 ENCOUNTER — TELEPHONE (OUTPATIENT)
Dept: INTERNAL MEDICINE CLINIC | Age: 70
End: 2025-05-14

## 2025-05-14 DIAGNOSIS — E11.65 TYPE 2 DIABETES MELLITUS WITH HYPERGLYCEMIA, WITHOUT LONG-TERM CURRENT USE OF INSULIN (HCC): Primary | ICD-10-CM

## 2025-05-22 ENCOUNTER — OFFICE VISIT (OUTPATIENT)
Dept: INTERNAL MEDICINE CLINIC | Age: 70
End: 2025-05-22
Payer: MEDICARE

## 2025-05-22 VITALS
WEIGHT: 170.2 LBS | HEART RATE: 71 BPM | BODY MASS INDEX: 23.08 KG/M2 | SYSTOLIC BLOOD PRESSURE: 136 MMHG | TEMPERATURE: 98.2 F | DIASTOLIC BLOOD PRESSURE: 82 MMHG

## 2025-05-22 DIAGNOSIS — E11.65 TYPE 2 DIABETES MELLITUS WITH HYPERGLYCEMIA, WITHOUT LONG-TERM CURRENT USE OF INSULIN (HCC): Primary | ICD-10-CM

## 2025-05-22 LAB — HBA1C MFR BLD: 8 % (ref 4.3–5.7)

## 2025-05-22 PROCEDURE — G0108 DIAB MANAGE TRN  PER INDIV: HCPCS | Performed by: PHARMACIST

## 2025-05-22 PROCEDURE — 83036 HEMOGLOBIN GLYCOSYLATED A1C: CPT | Performed by: PHARMACIST

## 2025-05-22 NOTE — PROGRESS NOTES
[facility]  [address] [place] [address]  [phone] (phone)  [phone] (fax)    Patient ID: Bruce Cooper 1955  Referring Provider: Dr. Vila     Patient's name and  were verified.    Subjective:    He presents for a follow-up diabetic visit. He has type 2 diabetes mellitus. He is compliant some of the time.  Assessment:     Lab Results   Component Value Date/Time    LABA1C 8.0 2025 10:23 AM    LABA1C 7.4 2024 01:28 PM    BUN 13 2025 05:51 AM    CREATININE 0.6 2025 05:51 AM     Vitals:    25 1806 25 1807   BP: (!) 146/69 136/82   Pulse: 71    Temp: 98.2 °F (36.8 °C)    Weight: 77.2 kg (170 lb 3.2 oz)      Wt Readings from Last 3 Encounters:   25 77.2 kg (170 lb 3.2 oz)   03/10/25 75.4 kg (166 lb 3.2 oz)   25 76.5 kg (168 lb 9.6 oz)     Ht Readings from Last 3 Encounters:   03/10/25 1.829 m (6')   25 1.829 m (6')   25 1.829 m (6')     Est, Glom Filt Rate   Date Value Ref Range Status   2025 > 90 >60 ml/min/1.73m2 Final     Diabetes Pharmacotherapy:  Farxiga 10 mg daily - [place] [facility] [place]  Ozempic 2 mg weekly - NovoCares    Glucose Trends:   Glucose at 0.1  hrs PPD today resulted at 155 mg/dl  Current monitoring regimen: Fingerstick blood tests - 0-1 times daily  Home blood sugar trends:    -Average Glucose: 152mg/dL              -Random readings: 103-173 mg/dL  Any episodes of hypoglycemia? no    Lifestyle Factors:   Previous visit with dietician: yes - 2024  Current diet: Brunch: coffee + protein drink, 12-3p: bowl of cereal OR 2 pieces toast            3p: couple rice krispies OR ham & cheese sand OR bowl of soup                       D: 7p: roast OR out to eat 2 days/week            Evening snacks: cheese spread & crackers + 10-15 peanuts in shell OR slice pie OR peaches + bananas                       Beverages: soda out to eat, water at home, some sweet iced tea (couple times monthly)  Current exercise:  resistance

## 2025-05-22 NOTE — PATIENT INSTRUCTIONS
Start checking some 2 hour post meal checks   Standard Blood Sugar Goals:  -Fasting AM:  mg/dL (best:  mg/dL)   -Before lunch/dinner:  mg/dL   -2 hours after eating/at bedtime: 100-180 mg/dL (best: less than 140mg/dL)      -A1c goal: 6-7%    2. Call to let us know when the appointment with Dr. Vila is - we can place a sensor 2 weeks in advance [phone]    3. Work on cutting back on sweets   -Consider \"sugar free\" candy, cookies   -Consider keto/low carb bread   -Try to limit snacks to 15 grams of carb or less

## 2025-05-28 ENCOUNTER — CLINICAL DOCUMENTATION (OUTPATIENT)
Age: 70
End: 2025-05-28

## 2025-05-28 DIAGNOSIS — E11.65 TYPE 2 DIABETES MELLITUS WITH HYPERGLYCEMIA, WITHOUT LONG-TERM CURRENT USE OF INSULIN (HCC): Primary | ICD-10-CM

## 2025-05-29 NOTE — PROGRESS NOTES
I spoke with patient regarding statin therapy.  He is not on statin at this point.  He sees Dr. Rincon for atrial fibrillation.  He gives a history of stroke about 12 years ago.  Repeat lipids and then make that decision.

## 2025-06-03 ENCOUNTER — OFFICE VISIT (OUTPATIENT)
Dept: FAMILY MEDICINE CLINIC | Age: 70
End: 2025-06-03
Payer: MEDICARE

## 2025-06-03 ENCOUNTER — LAB (OUTPATIENT)
Dept: LAB | Age: 70
End: 2025-06-03

## 2025-06-03 VITALS
WEIGHT: 167 LBS | BODY MASS INDEX: 22.13 KG/M2 | HEART RATE: 80 BPM | HEIGHT: 73 IN | TEMPERATURE: 98.2 F | DIASTOLIC BLOOD PRESSURE: 80 MMHG | SYSTOLIC BLOOD PRESSURE: 130 MMHG | RESPIRATION RATE: 20 BRPM | OXYGEN SATURATION: 96 %

## 2025-06-03 DIAGNOSIS — R09.89 CHEST CONGESTION: ICD-10-CM

## 2025-06-03 DIAGNOSIS — J40 BRONCHITIS: Primary | ICD-10-CM

## 2025-06-03 DIAGNOSIS — R05.1 ACUTE COUGH: ICD-10-CM

## 2025-06-03 DIAGNOSIS — J44.1 COPD EXACERBATION (HCC): ICD-10-CM

## 2025-06-03 DIAGNOSIS — E11.65 TYPE 2 DIABETES MELLITUS WITH HYPERGLYCEMIA, WITHOUT LONG-TERM CURRENT USE OF INSULIN (HCC): ICD-10-CM

## 2025-06-03 DIAGNOSIS — Z87.891 HISTORY OF CIGARETTE SMOKING: ICD-10-CM

## 2025-06-03 LAB
CHOLEST SERPL-MCNC: 151 MG/DL (ref 100–199)
HDLC SERPL-MCNC: 37 MG/DL
LDLC SERPL CALC-MCNC: 98 MG/DL
Lab: NORMAL
QC PASS/FAIL: NORMAL
SARS-COV-2 RDRP RESP QL NAA+PROBE: NEGATIVE
TRIGL SERPL-MCNC: 82 MG/DL (ref 0–199)

## 2025-06-03 PROCEDURE — 99214 OFFICE O/P EST MOD 30 MIN: CPT | Performed by: NURSE PRACTITIONER

## 2025-06-03 PROCEDURE — G8427 DOCREV CUR MEDS BY ELIG CLIN: HCPCS | Performed by: NURSE PRACTITIONER

## 2025-06-03 PROCEDURE — 3079F DIAST BP 80-89 MM HG: CPT | Performed by: NURSE PRACTITIONER

## 2025-06-03 PROCEDURE — 3023F SPIROM DOC REV: CPT | Performed by: NURSE PRACTITIONER

## 2025-06-03 PROCEDURE — 3017F COLORECTAL CA SCREEN DOC REV: CPT | Performed by: NURSE PRACTITIONER

## 2025-06-03 PROCEDURE — 4004F PT TOBACCO SCREEN RCVD TLK: CPT | Performed by: NURSE PRACTITIONER

## 2025-06-03 PROCEDURE — G8420 CALC BMI NORM PARAMETERS: HCPCS | Performed by: NURSE PRACTITIONER

## 2025-06-03 PROCEDURE — 87635 SARS-COV-2 COVID-19 AMP PRB: CPT | Performed by: NURSE PRACTITIONER

## 2025-06-03 PROCEDURE — 1123F ACP DISCUSS/DSCN MKR DOCD: CPT | Performed by: NURSE PRACTITIONER

## 2025-06-03 PROCEDURE — 3075F SYST BP GE 130 - 139MM HG: CPT | Performed by: NURSE PRACTITIONER

## 2025-06-03 RX ORDER — PREDNISONE 10 MG/1
10 TABLET ORAL 2 TIMES DAILY
Qty: 10 TABLET | Refills: 0 | Status: SHIPPED | OUTPATIENT
Start: 2025-06-03 | End: 2025-06-08

## 2025-06-03 RX ORDER — GUAIFENESIN 600 MG/1
600 TABLET, EXTENDED RELEASE ORAL 2 TIMES DAILY
Qty: 30 TABLET | Refills: 0 | Status: SHIPPED | OUTPATIENT
Start: 2025-06-03 | End: 2025-06-18

## 2025-06-03 RX ORDER — CEFUROXIME AXETIL 250 MG/1
250 TABLET ORAL 2 TIMES DAILY
Qty: 20 TABLET | Refills: 0 | Status: SHIPPED | OUTPATIENT
Start: 2025-06-03 | End: 2025-06-13

## 2025-06-03 RX ORDER — BENZONATATE 200 MG/1
200 CAPSULE ORAL 3 TIMES DAILY PRN
Qty: 30 CAPSULE | Refills: 0 | Status: SHIPPED | OUTPATIENT
Start: 2025-06-03 | End: 2025-06-13

## 2025-06-03 ASSESSMENT — PATIENT HEALTH QUESTIONNAIRE - PHQ9
SUM OF ALL RESPONSES TO PHQ QUESTIONS 1-9: 0
1. LITTLE INTEREST OR PLEASURE IN DOING THINGS: NOT AT ALL
SUM OF ALL RESPONSES TO PHQ QUESTIONS 1-9: 0
7. TROUBLE CONCENTRATING ON THINGS, SUCH AS READING THE NEWSPAPER OR WATCHING TELEVISION: NOT AT ALL
9. THOUGHTS THAT YOU WOULD BE BETTER OFF DEAD, OR OF HURTING YOURSELF: NOT AT ALL
8. MOVING OR SPEAKING SO SLOWLY THAT OTHER PEOPLE COULD HAVE NOTICED. OR THE OPPOSITE, BEING SO FIGETY OR RESTLESS THAT YOU HAVE BEEN MOVING AROUND A LOT MORE THAN USUAL: NOT AT ALL
4. FEELING TIRED OR HAVING LITTLE ENERGY: NOT AT ALL
6. FEELING BAD ABOUT YOURSELF - OR THAT YOU ARE A FAILURE OR HAVE LET YOURSELF OR YOUR FAMILY DOWN: NOT AT ALL
10. IF YOU CHECKED OFF ANY PROBLEMS, HOW DIFFICULT HAVE THESE PROBLEMS MADE IT FOR YOU TO DO YOUR WORK, TAKE CARE OF THINGS AT HOME, OR GET ALONG WITH OTHER PEOPLE: NOT DIFFICULT AT ALL
SUM OF ALL RESPONSES TO PHQ QUESTIONS 1-9: 0
3. TROUBLE FALLING OR STAYING ASLEEP: NOT AT ALL
2. FEELING DOWN, DEPRESSED OR HOPELESS: NOT AT ALL
SUM OF ALL RESPONSES TO PHQ QUESTIONS 1-9: 0
5. POOR APPETITE OR OVEREATING: NOT AT ALL

## 2025-06-05 ENCOUNTER — PATIENT MESSAGE (OUTPATIENT)
Dept: FAMILY MEDICINE CLINIC | Age: 70
End: 2025-06-05

## 2025-06-05 ASSESSMENT — ENCOUNTER SYMPTOMS
ABDOMINAL PAIN: 0
SHORTNESS OF BREATH: 1
ABDOMINAL DISTENTION: 0
WHEEZING: 1
COUGH: 1
CHEST TIGHTNESS: 1
BACK PAIN: 0

## 2025-06-05 NOTE — PROGRESS NOTES
scarred, perforated or bulging.      Nose: No nasal deformity, septal deviation, mucosal edema or rhinorrhea.      Right Sinus: No maxillary sinus tenderness or frontal sinus tenderness.      Left Sinus: No maxillary sinus tenderness or frontal sinus tenderness.      Mouth/Throat:      Mouth: No oral lesions.      Dentition: Normal dentition. Does not have dentures. No dental caries or dental abscesses.      Pharynx: No oropharyngeal exudate or posterior oropharyngeal erythema.      Tonsils: No tonsillar abscesses.   Eyes:      General: Lids are normal. No scleral icterus.     Extraocular Movements:      Right eye: Normal extraocular motion.      Left eye: Normal extraocular motion.      Conjunctiva/sclera: Conjunctivae normal.      Right eye: Right conjunctiva is not injected.      Left eye: Left conjunctiva is not injected.   Neck:      Thyroid: No thyroid mass or thyromegaly.      Vascular: No carotid bruit or JVD.      Trachea: Trachea normal.   Cardiovascular:      Rate and Rhythm: Normal rate and regular rhythm.      Heart sounds: Normal heart sounds, S1 normal and S2 normal. No murmur heard.     No friction rub. No gallop.   Pulmonary:      Effort: Pulmonary effort is normal. No respiratory distress.      Breath sounds: Examination of the right-lower field reveals rales. Wheezing, rhonchi and rales present.   Chest:      Chest wall: No tenderness.   Abdominal:      General: Bowel sounds are normal.      Palpations: Abdomen is soft. There is no hepatomegaly, splenomegaly or mass.      Tenderness: There is no guarding or rebound.      Hernia: No hernia is present. There is no hernia in the ventral area or left inguinal area.   Musculoskeletal:         General: No tenderness. Normal range of motion.      Cervical back: Normal range of motion and neck supple. No edema or erythema. Normal range of motion.   Lymphadenopathy:      Head:      Right side of head: No submental, submandibular, tonsillar, preauricular,

## 2025-06-06 ENCOUNTER — CLINICAL DOCUMENTATION (OUTPATIENT)
Age: 70
End: 2025-06-06

## 2025-06-06 DIAGNOSIS — E11.65 TYPE 2 DIABETES MELLITUS WITH HYPERGLYCEMIA, WITHOUT LONG-TERM CURRENT USE OF INSULIN (HCC): ICD-10-CM

## 2025-06-06 DIAGNOSIS — E78.2 MIXED HYPERLIPIDEMIA: Primary | ICD-10-CM

## 2025-06-06 NOTE — PROGRESS NOTES
I spoke with patient regarding statin therapy.  He would prefer Crestor.  Will repeat LFTs and if normal initiate Crestor 5 mg daily.  Patient aware of side effects including liver problems and muscle problems.  Please coordinate.

## 2025-06-07 ENCOUNTER — RESULTS FOLLOW-UP (OUTPATIENT)
Age: 70
End: 2025-06-07

## 2025-06-25 ENCOUNTER — OFFICE VISIT (OUTPATIENT)
Age: 70
End: 2025-06-25
Payer: MEDICARE

## 2025-06-25 VITALS
WEIGHT: 170.6 LBS | BODY MASS INDEX: 22.61 KG/M2 | DIASTOLIC BLOOD PRESSURE: 82 MMHG | HEART RATE: 68 BPM | HEIGHT: 73 IN | SYSTOLIC BLOOD PRESSURE: 140 MMHG

## 2025-06-25 DIAGNOSIS — E11.65 TYPE 2 DIABETES MELLITUS WITH HYPERGLYCEMIA, WITHOUT LONG-TERM CURRENT USE OF INSULIN (HCC): Primary | ICD-10-CM

## 2025-06-25 PROCEDURE — 3077F SYST BP >= 140 MM HG: CPT | Performed by: INTERNAL MEDICINE

## 2025-06-25 PROCEDURE — 1123F ACP DISCUSS/DSCN MKR DOCD: CPT | Performed by: INTERNAL MEDICINE

## 2025-06-25 PROCEDURE — 3079F DIAST BP 80-89 MM HG: CPT | Performed by: INTERNAL MEDICINE

## 2025-06-25 PROCEDURE — G8420 CALC BMI NORM PARAMETERS: HCPCS | Performed by: INTERNAL MEDICINE

## 2025-06-25 PROCEDURE — 4004F PT TOBACCO SCREEN RCVD TLK: CPT | Performed by: INTERNAL MEDICINE

## 2025-06-25 PROCEDURE — 3017F COLORECTAL CA SCREEN DOC REV: CPT | Performed by: INTERNAL MEDICINE

## 2025-06-25 PROCEDURE — 3052F HG A1C>EQUAL 8.0%<EQUAL 9.0%: CPT | Performed by: INTERNAL MEDICINE

## 2025-06-25 PROCEDURE — G8427 DOCREV CUR MEDS BY ELIG CLIN: HCPCS | Performed by: INTERNAL MEDICINE

## 2025-06-25 PROCEDURE — 2022F DILAT RTA XM EVC RTNOPTHY: CPT | Performed by: INTERNAL MEDICINE

## 2025-06-25 PROCEDURE — 99214 OFFICE O/P EST MOD 30 MIN: CPT | Performed by: INTERNAL MEDICINE

## 2025-06-25 PROCEDURE — 1159F MED LIST DOCD IN RCRD: CPT | Performed by: INTERNAL MEDICINE

## 2025-06-25 RX ORDER — REPAGLINIDE 0.5 MG/1
0.5 TABLET ORAL
Qty: 60 TABLET | Refills: 3 | Status: SHIPPED | OUTPATIENT
Start: 2025-06-25

## 2025-06-25 NOTE — PROGRESS NOTES
Alert and oriented ×3.  Making good eye contact.  Affect is normal.  He has some appropriate insight.    Assessment/Plan:    1. Type 2 diabetes mellitus with hyperglycemia, without long-term current use of insulin (HCC)      -There is a significant amount of post prandial hyperglycemia show in  the patients CGM logs. He will be started on 0.5 mg Prandin BID with his meals.He was also given a sample CGM to be used after starting the Prandin to determine response to treatment.    -His dietary compliance is poor. He will start looking at nutrition labels, reducing his portions, and will monitor his response to food choices. He intends to avoid sweet foods such as sherbet.    -He will bring BG logs to the office in 1 month    The risks and benefits of my recommendations, as well as other treatment options were discussed with the patient today. Questions were answered.  Follow up: 3-4 months and as needed.         Electronically signed by Parrish Weaver MD 6/25/2025 10:57 AM     **This report has been created using voice recognition software. It may   contain minor errors which are inherent in voice recognition technology.**

## 2025-07-14 ENCOUNTER — TELEPHONE (OUTPATIENT)
Age: 70
End: 2025-07-14

## 2025-07-14 NOTE — TELEPHONE ENCOUNTER
Patient stop taking prandin because his blood sugars were dropping to the 50s during the night on it.

## 2025-07-19 ENCOUNTER — CLINICAL DOCUMENTATION (OUTPATIENT)
Age: 70
End: 2025-07-19

## 2025-07-19 NOTE — PROGRESS NOTES
Blood sugars look acceptable.  I would like to see more readings in a month.  No changes at this point.

## 2025-07-22 ENCOUNTER — TELEPHONE (OUTPATIENT)
Age: 70
End: 2025-07-22

## 2025-07-22 NOTE — TELEPHONE ENCOUNTER
Patient called in about blood sugars I informed him per Dr. Vila he should call in one month to give blood sugars again. Patient said his insurance doesn't cover joaquín and he can't pay out of pocket for them. He does not like to prick himself everyday either. Please advise.

## 2025-07-24 NOTE — TELEPHONE ENCOUNTER
Patient was informed that he can get a sample and will talk with his insurance company about if Dexcom is covered.

## 2025-08-28 RX ORDER — PRAMIPEXOLE DIHYDROCHLORIDE 0.12 MG/1
0.12 TABLET ORAL NIGHTLY
Qty: 90 TABLET | Refills: 2 | Status: SHIPPED | OUTPATIENT
Start: 2025-08-28

## 2025-09-03 ENCOUNTER — CLINICAL DOCUMENTATION (OUTPATIENT)
Age: 70
End: 2025-09-03

## 2025-09-03 DIAGNOSIS — E11.65 TYPE 2 DIABETES MELLITUS WITH HYPERGLYCEMIA, WITHOUT LONG-TERM CURRENT USE OF INSULIN (HCC): Primary | ICD-10-CM

## 2025-09-03 RX ORDER — HYDROCHLOROTHIAZIDE 12.5 MG/1
CAPSULE ORAL
Qty: 6 EACH | Refills: 1 | Status: SHIPPED | OUTPATIENT
Start: 2025-09-03

## 2025-09-04 ENCOUNTER — TELEPHONE (OUTPATIENT)
Age: 70
End: 2025-09-04